# Patient Record
Sex: FEMALE | Race: WHITE | NOT HISPANIC OR LATINO | Employment: OTHER | URBAN - METROPOLITAN AREA
[De-identification: names, ages, dates, MRNs, and addresses within clinical notes are randomized per-mention and may not be internally consistent; named-entity substitution may affect disease eponyms.]

---

## 2018-08-10 ENCOUNTER — HOSPITAL ENCOUNTER (EMERGENCY)
Facility: HOSPITAL | Age: 78
Discharge: HOME | End: 2018-08-10
Attending: EMERGENCY MEDICINE | Admitting: EMERGENCY MEDICINE
Payer: MEDICARE

## 2018-08-10 ENCOUNTER — APPOINTMENT (EMERGENCY)
Dept: RADIOLOGY | Facility: HOSPITAL | Age: 78
End: 2018-08-10
Payer: MEDICARE

## 2018-08-10 VITALS
RESPIRATION RATE: 24 BRPM | WEIGHT: 180 LBS | TEMPERATURE: 98.6 F | OXYGEN SATURATION: 97 % | SYSTOLIC BLOOD PRESSURE: 178 MMHG | HEART RATE: 107 BPM | DIASTOLIC BLOOD PRESSURE: 83 MMHG

## 2018-08-10 DIAGNOSIS — R05.9 COUGH: Primary | ICD-10-CM

## 2018-08-10 DIAGNOSIS — J30.2 SEASONAL ALLERGIES: ICD-10-CM

## 2018-08-10 LAB
ALBUMIN SERPL BCP-MCNC: 2.6 G/DL (ref 3.5–5)
ALP SERPL-CCNC: 123 U/L (ref 46–116)
ALT SERPL W P-5'-P-CCNC: 13 U/L (ref 12–78)
ANION GAP SERPL CALCULATED.3IONS-SCNC: 6 MMOL/L (ref 4–13)
AST SERPL W P-5'-P-CCNC: 20 U/L (ref 5–45)
BASOPHILS # BLD AUTO: 0.04 THOUSANDS/ΜL (ref 0–0.1)
BASOPHILS NFR BLD AUTO: 0 % (ref 0–1)
BILIRUB SERPL-MCNC: 0.4 MG/DL (ref 0.2–1)
BUN SERPL-MCNC: 20 MG/DL (ref 5–25)
CALCIUM SERPL-MCNC: 8.6 MG/DL (ref 8.3–10.1)
CHLORIDE SERPL-SCNC: 104 MMOL/L (ref 100–108)
CO2 SERPL-SCNC: 29 MMOL/L (ref 21–32)
CREAT SERPL-MCNC: 0.79 MG/DL (ref 0.6–1.3)
EOSINOPHIL # BLD AUTO: 0.23 THOUSAND/ΜL (ref 0–0.61)
EOSINOPHIL NFR BLD AUTO: 3 % (ref 0–6)
ERYTHROCYTE [DISTWIDTH] IN BLOOD BY AUTOMATED COUNT: 13.2 % (ref 11.6–15.1)
GFR SERPL CREATININE-BSD FRML MDRD: 71 ML/MIN/1.73SQ M
GLUCOSE SERPL-MCNC: 114 MG/DL (ref 65–140)
HCT VFR BLD AUTO: 35.7 % (ref 34.8–46.1)
HGB BLD-MCNC: 10.9 G/DL (ref 11.5–15.4)
IMM GRANULOCYTES # BLD AUTO: 0.04 THOUSAND/UL (ref 0–0.2)
IMM GRANULOCYTES NFR BLD AUTO: 0 % (ref 0–2)
LYMPHOCYTES # BLD AUTO: 1.83 THOUSANDS/ΜL (ref 0.6–4.47)
LYMPHOCYTES NFR BLD AUTO: 20 % (ref 14–44)
MCH RBC QN AUTO: 25.6 PG (ref 26.8–34.3)
MCHC RBC AUTO-ENTMCNC: 30.5 G/DL (ref 31.4–37.4)
MCV RBC AUTO: 84 FL (ref 82–98)
MONOCYTES # BLD AUTO: 1.1 THOUSAND/ΜL (ref 0.17–1.22)
MONOCYTES NFR BLD AUTO: 12 % (ref 4–12)
NEUTROPHILS # BLD AUTO: 6.13 THOUSANDS/ΜL (ref 1.85–7.62)
NEUTS SEG NFR BLD AUTO: 65 % (ref 43–75)
NRBC BLD AUTO-RTO: 0 /100 WBCS
PLATELET # BLD AUTO: 384 THOUSANDS/UL (ref 149–390)
PMV BLD AUTO: 9.2 FL (ref 8.9–12.7)
POTASSIUM SERPL-SCNC: 3.5 MMOL/L (ref 3.5–5.3)
PROT SERPL-MCNC: 6.8 G/DL (ref 6.4–8.2)
RBC # BLD AUTO: 4.25 MILLION/UL (ref 3.81–5.12)
SODIUM SERPL-SCNC: 139 MMOL/L (ref 136–145)
WBC # BLD AUTO: 9.37 THOUSAND/UL (ref 4.31–10.16)

## 2018-08-10 PROCEDURE — 80053 COMPREHEN METABOLIC PANEL: CPT | Performed by: EMERGENCY MEDICINE

## 2018-08-10 PROCEDURE — 71045 X-RAY EXAM CHEST 1 VIEW: CPT

## 2018-08-10 PROCEDURE — 85025 COMPLETE CBC W/AUTO DIFF WBC: CPT | Performed by: EMERGENCY MEDICINE

## 2018-08-10 PROCEDURE — 94640 AIRWAY INHALATION TREATMENT: CPT

## 2018-08-10 PROCEDURE — 36415 COLL VENOUS BLD VENIPUNCTURE: CPT | Performed by: EMERGENCY MEDICINE

## 2018-08-10 PROCEDURE — 99283 EMERGENCY DEPT VISIT LOW MDM: CPT

## 2018-08-10 RX ORDER — FLUTICASONE PROPIONATE 50 MCG
1 SPRAY, SUSPENSION (ML) NASAL DAILY
COMMUNITY
End: 2018-08-15

## 2018-08-10 RX ORDER — IPRATROPIUM BROMIDE AND ALBUTEROL SULFATE 2.5; .5 MG/3ML; MG/3ML
3 SOLUTION RESPIRATORY (INHALATION) ONCE
Status: COMPLETED | OUTPATIENT
Start: 2018-08-10 | End: 2018-08-10

## 2018-08-10 RX ORDER — LORATADINE 10 MG/1
10 TABLET ORAL DAILY
COMMUNITY
End: 2018-08-15

## 2018-08-10 RX ORDER — ALBUTEROL SULFATE 90 UG/1
2 AEROSOL, METERED RESPIRATORY (INHALATION) EVERY 4 HOURS PRN
Qty: 1 INHALER | Refills: 0 | Status: SHIPPED | OUTPATIENT
Start: 2018-08-10 | End: 2018-08-15

## 2018-08-10 RX ORDER — CETIRIZINE HYDROCHLORIDE, PSEUDOEPHEDRINE HYDROCHLORIDE 5; 120 MG/1; MG/1
1 TABLET, FILM COATED, EXTENDED RELEASE ORAL 2 TIMES DAILY
Qty: 30 TABLET | Refills: 0 | Status: SHIPPED | OUTPATIENT
Start: 2018-08-10 | End: 2018-08-15

## 2018-08-10 RX ADMIN — IPRATROPIUM BROMIDE AND ALBUTEROL SULFATE 3 ML: .5; 3 SOLUTION RESPIRATORY (INHALATION) at 02:40

## 2018-08-10 NOTE — ED PROVIDER NOTES
History  Chief Complaint   Patient presents with    Cough     sx's started , non productive cough,dx by primary  Allergies given Flonase,Claritin  Cough increase  appt with ent today  Patient states she started coughing about 2 weeks ago on or about the   She has not had a fever  The cough is very poorly to nonproductive  Patient was seen by her PCP who thought that the cough was caused by allergies and put her on medications  The patient states however she could not tolerate the medications and stopped the med after 3 or 4 days  Since that time she has had continuous cough worse at night with sleep disturbance  Frustrated she came to the ED  She has no sick contacts no fever chills            Prior to Admission Medications   Prescriptions Last Dose Informant Patient Reported? Taking?   fluticasone (FLONASE) 50 mcg/act nasal spray   Yes Yes   Si spray into each nostril daily   loratadine (CLARITIN) 10 mg tablet   Yes Yes   Sig: Take 10 mg by mouth daily      Facility-Administered Medications: None       No past medical history on file  No past surgical history on file  No family history on file  I have reviewed and agree with the history as documented  Social History   Substance Use Topics    Smoking status: Never Smoker    Smokeless tobacco: Never Used    Alcohol use No        Review of Systems   Constitutional: Negative for chills and fever  HENT: Positive for congestion  Negative for sore throat  Respiratory: Positive for cough  Negative for wheezing  Cardiovascular: Negative for chest pain  Gastrointestinal: Negative for abdominal pain  Genitourinary: Negative for dysuria  Musculoskeletal: Negative for back pain and neck pain  Skin: Negative for rash  Neurological: Positive for weakness  Negative for headaches  All other systems reviewed and are negative        Physical Exam  Physical Exam   Constitutional: She is oriented to person, place, and time  She appears well-developed and well-nourished  HENT:   Head: Normocephalic and atraumatic  Mouth/Throat: Oropharynx is clear and moist    Eyes: Conjunctivae are normal    Neck: Normal range of motion  Neck supple  Cardiovascular: Normal rate, regular rhythm and normal heart sounds  Pulmonary/Chest: Effort normal and breath sounds normal  She has no wheezes  Abdominal: Soft  Bowel sounds are normal  She exhibits no distension  There is no tenderness  Musculoskeletal: Normal range of motion  She exhibits no edema  Neurological: She is alert and oriented to person, place, and time  Skin: Skin is warm and dry  Psychiatric: She has a normal mood and affect  Her behavior is normal    Nursing note and vitals reviewed        Vital Signs  ED Triage Vitals [08/10/18 0215]   Temperature Pulse Respirations Blood Pressure SpO2   98 6 °F (37 °C) (!) 107 (!) 24 (!) 178/83 97 %      Temp Source Heart Rate Source Patient Position - Orthostatic VS BP Location FiO2 (%)   Tympanic Monitor Lying -- --      Pain Score       2           Vitals:    08/10/18 0215   BP: (!) 178/83   Pulse: (!) 107   Patient Position - Orthostatic VS: Lying       Visual Acuity      ED Medications  Medications   ipratropium-albuterol (DUO-NEB) 0 5-2 5 mg/3 mL inhalation solution 3 mL (3 mL Nebulization Given 8/10/18 0240)       Diagnostic Studies  Results Reviewed     Procedure Component Value Units Date/Time    Comprehensive metabolic panel [[id number]]  (Abnormal) Collected:  08/10/18 0250    Lab Status:  Final result Specimen:  Blood from Vein Updated:  08/10/18 0311     Sodium 139 mmol/L      Potassium 3 5 mmol/L      Chloride 104 mmol/L      CO2 29 mmol/L      Anion Gap 6 mmol/L      BUN 20 mg/dL      Creatinine 0 79 mg/dL      Glucose 114 mg/dL      Calcium 8 6 mg/dL      AST 20 U/L      ALT 13 U/L      Alkaline Phosphatase 123 (H) U/L      Total Protein 6 8 g/dL      Albumin 2 6 (L) g/dL      Total Bilirubin 0 40 mg/dL      eGFR 71 ml/min/1 73sq m     Narrative:         National Kidney Disease Education Program recommendations are as follows:  GFR calculation is accurate only with a steady state creatinine  Chronic Kidney disease less than 60 ml/min/1 73 sq  meters  Kidney failure less than 15 ml/min/1 73 sq  meters  CBC and differential [[id number]]  (Abnormal) Collected:  08/10/18 0250    Lab Status:  Final result Specimen:  Blood from Vein Updated:  08/10/18 0255     WBC 9 37 Thousand/uL      RBC 4 25 Million/uL      Hemoglobin 10 9 (L) g/dL      Hematocrit 35 7 %      MCV 84 fL      MCH 25 6 (L) pg      MCHC 30 5 (L) g/dL      RDW 13 2 %      MPV 9 2 fL      Platelets 332 Thousands/uL      nRBC 0 /100 WBCs      Neutrophils Relative 65 %      Immat GRANS % 0 %      Lymphocytes Relative 20 %      Monocytes Relative 12 %      Eosinophils Relative 3 %      Basophils Relative 0 %      Neutrophils Absolute 6 13 Thousands/µL      Immature Grans Absolute 0 04 Thousand/uL      Lymphocytes Absolute 1 83 Thousands/µL      Monocytes Absolute 1 10 Thousand/µL      Eosinophils Absolute 0 23 Thousand/µL      Basophils Absolute 0 04 Thousands/µL                  XR chest 1 view portable    (Results Pending)   XR chest 1 view    (Results Pending)              Procedures  Procedures       Phone Contacts  ED Phone Contact    ED Course                               MDM  Number of Diagnoses or Management Options  Cough:   Seasonal allergies:   Diagnosis management comments: Will check for possible infectious cause in this elderly female for this cough however my initial impression is that her PMD was correct    If so I will change allergy meds to something she may be tolerating better    CritCare Time    Disposition  Final diagnoses:   Cough   Seasonal allergies     Time reflects when diagnosis was documented in both MDM as applicable and the Disposition within this note     Time User Action Codes Description Comment    8/10/2018  3:13 AM Cy Bodily Add [R05] Cough     8/10/2018  3:13 AM Rad SOTO Add [J30 2] Seasonal allergies       ED Disposition     ED Disposition Condition Comment    Discharge  Gilbertamy Susana discharge to home/self care  Condition at discharge: Stable        Follow-up Information     Follow up With Specialties Details Why Contact Info    Bhargav Avendaño MD Internal Medicine Schedule an appointment as soon as possible for a visit in 1 day  [address]  [phone]            Patient's Medications   Discharge Prescriptions    ALBUTEROL (PROVENTIL HFA,VENTOLIN HFA) 90 MCG/ACT INHALER    Inhale 2 puffs every 4 (four) hours as needed for wheezing       Start Date: 8/10/2018 End Date: --       Order Dose: 2 puffs       Quantity: 1 Inhaler    Refills: 0    CETIRIZINE-PSEUDOEPHEDRINE (ZYRTEC-D) 5-120 MG PER TABLET    Take 1 tablet by mouth 2 (two) times a day       Start Date: 8/10/2018 End Date: --       Order Dose: 1 tablet       Quantity: 30 tablet    Refills: 0     No discharge procedures on file      ED Provider  Electronically Signed by           Paula Mitchell MD  08/10/18 4251

## 2018-08-10 NOTE — DISCHARGE INSTRUCTIONS
Allergies, Ambulatory Care   GENERAL INFORMATION:   Allergies  are an immune system reaction to a substance called an allergen  Your immune system sees the allergen as harmful and attacks it  Common symptoms include the following:   · Sneezing and runny, itchy, or stuffy nose    · Swollen, watery, or itchy eyes    · Itchy skin, mouth, ears, or throat    · Swelling, pain, or itch at the site of an insect sting  Seek immediate care for the following symptoms:   · Trouble swallowing or your throat or tongue is swollen    · Wheezing or trouble breathing    · Dizziness or feeling faint    · Chest pain or your heart is fluttering  Treatment for allergies  may include medicines to slow a serious allergic reaction  You may be given medicines that help decrease itching, sneezing, and swelling or help your nose feel less stuffy  Your healthcare provider may give you several different medicines to help decrease swelling, redness, and itching  Medicines may be given as pills, shots, or put directly on your skin  Nasal sprays or eye drops may also be used  Desensitization treatment may get your body used to allergens you cannot avoid  Your healthcare provider will give you a shot that contains a small amount of an allergen, giving a little more each time until your body gets used to it  Your healthcare provider will watch you closely and treat any allergic reaction you have  Your reaction to the allergen may be less serious after this treatment  Ask your healthcare provider how long you need to get the shots  Manage allergies:   · Use nasal rinses  Healthcare providers may suggest that you rinse your nasal passages with a saline solution  Daily rinsing may help clear your nose of allergens  · Do not smoke  Your allergy symptoms may decrease if you are not around smoke  If you smoke, it is never too late to quit  Ask your healthcare provider for information about how to stop if you need help quitting      · Carry medical alert identification  You may want to wear medical alert jewelry or carry a card that says you have an allergy  Ask your healthcare provider where to get medical alert identification  Prevent allergic reactions:   · Avoid seasonal allergic reactions  Do not go outside when pollen counts are high  Your symptoms may be better if you go outside only in the morning or evening  Use your air conditioner and change air filters often  · Dust and vacuum your home often  to avoid allergic reactions to dust, fur, or mold  You may want to wear a mask when you vacuum  Keep pets in certain rooms and bathe them often  Use a dehumidifier (machine that decreases moisture) to help prevent mold  · Do not use products that contain latex  if you have a latex allergy  Use nonlatex gloves if you work in healthcare or in food preparation  Always tell healthcare providers if you have a latex allergy  · Avoid insect stings  Stay away from areas or activities that increase your risk for being stung  These include trash cans, gardening, and picnics  Do not wear bright clothing or strong scents when you will be outside  Follow up with your healthcare provider as directed:  Write down your questions so you remember to ask them during your visits  CARE AGREEMENT:   You have the right to help plan your care  Learn about your health condition and how it may be treated  Discuss treatment options with your caregivers to decide what care you want to receive  You always have the right to refuse treatment  The above information is an  only  It is not intended as medical advice for individual conditions or treatments  Talk to your doctor, nurse or pharmacist before following any medical regimen to see if it is safe and effective for you  © 2014 [address] is for End User's use only and may not be sold, redistributed or otherwise used for commercial purposes   All illustrations and images included in [employer] are the copyrighted property of A D A M [employer] Inc  or [employer]

## 2018-08-15 ENCOUNTER — HOSPITAL ENCOUNTER (INPATIENT)
Facility: HOSPITAL | Age: 78
LOS: 2 days | Discharge: HOME | DRG: 755 | End: 2018-08-17
Attending: EMERGENCY MEDICINE | Admitting: FAMILY MEDICINE
Payer: MEDICARE

## 2018-08-15 ENCOUNTER — APPOINTMENT (EMERGENCY)
Dept: RADIOLOGY | Facility: HOSPITAL | Age: 78
DRG: 755 | End: 2018-08-15
Payer: MEDICARE

## 2018-08-15 DIAGNOSIS — I82.4Z2 DVT, LOWER EXTREMITY, DISTAL, ACUTE, LEFT (HCC): ICD-10-CM

## 2018-08-15 DIAGNOSIS — R18.8 ASCITES: ICD-10-CM

## 2018-08-15 DIAGNOSIS — R19.00 PELVIC MASS IN FEMALE: Primary | ICD-10-CM

## 2018-08-15 DIAGNOSIS — N83.8 OVARIAN MASS, LEFT: ICD-10-CM

## 2018-08-15 DIAGNOSIS — R91.1 PULMONARY NODULE: ICD-10-CM

## 2018-08-15 PROBLEM — R22.1 NECK SWELLING: Status: ACTIVE | Noted: 2018-08-15

## 2018-08-15 PROBLEM — R60.0 BILATERAL LEG EDEMA: Status: ACTIVE | Noted: 2018-08-15

## 2018-08-15 PROBLEM — N85.00 ENDOMETRIAL HYPERPLASIA: Status: ACTIVE | Noted: 2018-08-15

## 2018-08-15 PROBLEM — D72.829 LEUCOCYTOSIS: Status: ACTIVE | Noted: 2018-08-15

## 2018-08-15 LAB
ALBUMIN SERPL BCP-MCNC: 2.8 G/DL (ref 3.5–5)
ALP SERPL-CCNC: 133 U/L (ref 46–116)
ALT SERPL W P-5'-P-CCNC: 12 U/L (ref 12–78)
ANION GAP SERPL CALCULATED.3IONS-SCNC: 4 MMOL/L (ref 4–13)
APTT PPP: 28 SECONDS (ref 24–33)
AST SERPL W P-5'-P-CCNC: 19 U/L (ref 5–45)
BACTERIA UR QL AUTO: ABNORMAL /HPF
BASOPHILS # BLD AUTO: 0.05 THOUSANDS/ΜL (ref 0–0.1)
BASOPHILS NFR BLD AUTO: 1 % (ref 0–1)
BILIRUB SERPL-MCNC: 0.4 MG/DL (ref 0.2–1)
BILIRUB UR QL STRIP: ABNORMAL
BUN SERPL-MCNC: 18 MG/DL (ref 5–25)
CALCIUM SERPL-MCNC: 8.9 MG/DL (ref 8.3–10.1)
CHLORIDE SERPL-SCNC: 102 MMOL/L (ref 100–108)
CLARITY UR: CLEAR
CO2 SERPL-SCNC: 32 MMOL/L (ref 21–32)
COLOR UR: YELLOW
CREAT SERPL-MCNC: 0.81 MG/DL (ref 0.6–1.3)
EOSINOPHIL # BLD AUTO: 0.09 THOUSAND/ΜL (ref 0–0.61)
EOSINOPHIL NFR BLD AUTO: 1 % (ref 0–6)
ERYTHROCYTE [DISTWIDTH] IN BLOOD BY AUTOMATED COUNT: 13.3 % (ref 11.6–15.1)
GFR SERPL CREATININE-BSD FRML MDRD: 69 ML/MIN/1.73SQ M
GLUCOSE SERPL-MCNC: 100 MG/DL (ref 65–140)
GLUCOSE UR STRIP-MCNC: NEGATIVE MG/DL
HCT VFR BLD AUTO: 39.5 % (ref 34.8–46.1)
HGB BLD-MCNC: 11.9 G/DL (ref 11.5–15.4)
HGB UR QL STRIP.AUTO: ABNORMAL
IMM GRANULOCYTES # BLD AUTO: 0.03 THOUSAND/UL (ref 0–0.2)
IMM GRANULOCYTES NFR BLD AUTO: 0 % (ref 0–2)
INR PPP: 1.06 (ref 0.86–1.16)
KETONES UR STRIP-MCNC: ABNORMAL MG/DL
LEUKOCYTE ESTERASE UR QL STRIP: ABNORMAL
LIPASE SERPL-CCNC: 104 U/L (ref 73–393)
LYMPHOCYTES # BLD AUTO: 1.35 THOUSANDS/ΜL (ref 0.6–4.47)
LYMPHOCYTES NFR BLD AUTO: 12 % (ref 14–44)
MCH RBC QN AUTO: 25.4 PG (ref 26.8–34.3)
MCHC RBC AUTO-ENTMCNC: 30.1 G/DL (ref 31.4–37.4)
MCV RBC AUTO: 84 FL (ref 82–98)
MONOCYTES # BLD AUTO: 1 THOUSAND/ΜL (ref 0.17–1.22)
MONOCYTES NFR BLD AUTO: 9 % (ref 4–12)
NEUTROPHILS # BLD AUTO: 8.37 THOUSANDS/ΜL (ref 1.85–7.62)
NEUTS SEG NFR BLD AUTO: 77 % (ref 43–75)
NITRITE UR QL STRIP: NEGATIVE
NON-SQ EPI CELLS URNS QL MICRO: ABNORMAL /HPF
NRBC BLD AUTO-RTO: 0 /100 WBCS
PH UR STRIP.AUTO: 6 [PH] (ref 5–9)
PLATELET # BLD AUTO: 463 THOUSANDS/UL (ref 149–390)
PLATELET # BLD AUTO: 486 THOUSANDS/UL (ref 149–390)
PMV BLD AUTO: 9.1 FL (ref 8.9–12.7)
PMV BLD AUTO: 9.2 FL (ref 8.9–12.7)
POTASSIUM SERPL-SCNC: 3.6 MMOL/L (ref 3.5–5.3)
PROT SERPL-MCNC: 7.5 G/DL (ref 6.4–8.2)
PROT UR STRIP-MCNC: ABNORMAL MG/DL
PROTHROMBIN TIME: 11.1 SECONDS (ref 9.4–11.7)
RBC # BLD AUTO: 4.68 MILLION/UL (ref 3.81–5.12)
RBC #/AREA URNS AUTO: ABNORMAL /HPF
SODIUM SERPL-SCNC: 138 MMOL/L (ref 136–145)
SP GR UR STRIP.AUTO: 1.01 (ref 1–1.03)
UROBILINOGEN UR QL STRIP.AUTO: 0.2 E.U./DL
WBC # BLD AUTO: 10.89 THOUSAND/UL (ref 4.31–10.16)
WBC #/AREA URNS AUTO: ABNORMAL /HPF

## 2018-08-15 PROCEDURE — 81001 URINALYSIS AUTO W/SCOPE: CPT | Performed by: EMERGENCY MEDICINE

## 2018-08-15 PROCEDURE — 87081 CULTURE SCREEN ONLY: CPT | Performed by: INTERNAL MEDICINE

## 2018-08-15 PROCEDURE — 83690 ASSAY OF LIPASE: CPT | Performed by: EMERGENCY MEDICINE

## 2018-08-15 PROCEDURE — 99285 EMERGENCY DEPT VISIT HI MDM: CPT

## 2018-08-15 PROCEDURE — 85730 THROMBOPLASTIN TIME PARTIAL: CPT | Performed by: EMERGENCY MEDICINE

## 2018-08-15 PROCEDURE — 85049 AUTOMATED PLATELET COUNT: CPT | Performed by: INTERNAL MEDICINE

## 2018-08-15 PROCEDURE — 85025 COMPLETE CBC W/AUTO DIFF WBC: CPT | Performed by: EMERGENCY MEDICINE

## 2018-08-15 PROCEDURE — 36415 COLL VENOUS BLD VENIPUNCTURE: CPT | Performed by: EMERGENCY MEDICINE

## 2018-08-15 PROCEDURE — 80053 COMPREHEN METABOLIC PANEL: CPT | Performed by: EMERGENCY MEDICINE

## 2018-08-15 PROCEDURE — 96360 HYDRATION IV INFUSION INIT: CPT

## 2018-08-15 PROCEDURE — 85610 PROTHROMBIN TIME: CPT | Performed by: EMERGENCY MEDICINE

## 2018-08-15 PROCEDURE — 74177 CT ABD & PELVIS W/CONTRAST: CPT

## 2018-08-15 PROCEDURE — 93005 ELECTROCARDIOGRAM TRACING: CPT

## 2018-08-15 PROCEDURE — 94664 DEMO&/EVAL PT USE INHALER: CPT

## 2018-08-15 PROCEDURE — 76856 US EXAM PELVIC COMPLETE: CPT

## 2018-08-15 PROCEDURE — 99223 1ST HOSP IP/OBS HIGH 75: CPT | Performed by: INTERNAL MEDICINE

## 2018-08-15 RX ORDER — GUAIFENESIN/DEXTROMETHORPHAN 100-10MG/5
10 SYRUP ORAL EVERY 4 HOURS PRN
Status: DISCONTINUED | OUTPATIENT
Start: 2018-08-15 | End: 2018-08-17 | Stop reason: HOSPADM

## 2018-08-15 RX ORDER — ACETAMINOPHEN 325 MG/1
650 TABLET ORAL EVERY 6 HOURS PRN
Status: DISCONTINUED | OUTPATIENT
Start: 2018-08-15 | End: 2018-08-17 | Stop reason: HOSPADM

## 2018-08-15 RX ORDER — ONDANSETRON 2 MG/ML
4 INJECTION INTRAMUSCULAR; INTRAVENOUS EVERY 6 HOURS PRN
Status: DISCONTINUED | OUTPATIENT
Start: 2018-08-15 | End: 2018-08-17 | Stop reason: HOSPADM

## 2018-08-15 RX ORDER — HEPARIN SODIUM 5000 [USP'U]/ML
5000 INJECTION, SOLUTION INTRAVENOUS; SUBCUTANEOUS EVERY 8 HOURS SCHEDULED
Status: DISCONTINUED | OUTPATIENT
Start: 2018-08-15 | End: 2018-08-16

## 2018-08-15 RX ADMIN — IOHEXOL 100 ML: 350 INJECTION, SOLUTION INTRAVENOUS at 13:46

## 2018-08-15 RX ADMIN — SODIUM CHLORIDE 1000 ML: 0.9 INJECTION, SOLUTION INTRAVENOUS at 12:44

## 2018-08-15 RX ADMIN — HEPARIN SODIUM 5000 UNITS: 5000 INJECTION, SOLUTION INTRAVENOUS; SUBCUTANEOUS at 21:21

## 2018-08-15 NOTE — H&P
History and Physical - [facility] Internal Medicine    Patient Information: Mimi Hyde 77 y o  female MRN: [id number]  Unit/Bed#: [address] [address] Encounter: [id number]  Admitting Physician: Kiera Jones MD  PCP: Randal Waters MD  Date of Admission:  08/15/18        Hospital Problem List:     Principal Problem:    Ascites  Active Problems:    Ovarian mass, left    Endometrial hyperplasia    Pulmonary nodule    Leucocytosis    Bilateral leg edema      Assessment/Plan:    * Ascites   Assessment & Plan    New onset  Now symptomatic with abdominal distention, nausea and poor appetite  Denies shortness of breath or abdominal pain  Likely related to left ovarian mass  No evidence of cirrhosis or CHF  Will get diagnostic & therapeutic paracentesis  Check  and cytology            Endometrial hyperplasia   Assessment & Plan    9 mm  in maximal thickness, suggesting the possibility of endometrial hyperplasia or neoplasia  Denies any bleeding or discharge  Follow up with gyn Oncology as above        Ovarian mass, left   Assessment & Plan    Complex left adnexal mass measuring 3 6 x 3 2 x 4 2 cm   Check   Denies abdominal pain or vaginal discharge  Discussed with GYNOncology, Dr Clementina Woods  Agree with paracentesis  Will schedule outpatient follow-up in office as soon as possible after discharge          Pulmonary nodule   Assessment & Plan    Incidental  Follow-up as outpatient        Bilateral leg edema   Assessment & Plan    Gradually progressive over 2-3 weeks  Denies chest pain or shortness of breath  Will check venous Doppler        Leucocytosis   Assessment & Plan    Likely reactive  Monitor                VTE Prophylaxis: Heparin  / sequential compression device   Code Status: Level 1 - Full Code    Anticipated Length of Stay:  Patient will be admitted on an Inpatient basis with an anticipated length of stay of  > 2 midnights     Justification for Hospital Stay:  Evaluation treatment of symptomatic new onset ascites    Total Time for Visit, including Counseling / Coordination of Care: 45 minutes  Greater than 50% of this total time spent on direct patient counseling and coordination of care  Chief Complaint:     Abdominal Pain (mid abd pain since about the 27th of July, intermittent nausea, abd distention, soft stools)    History of Present Illness:    Wilbert Chi is a 77 y o  female with no significant past medical history who presents with abdominal distention over last 2 - 3 weeks  Patient reported that she noticed over last few weeks that she was getting progressive abdominal distention which was associated with nausea and dry heaving and poor appetite  Patient also reported ongoing non productive cough over last 2 weeks for which she was evaluated emergency room a last week and which was deemed secondary to allergy and now appears to be better  Patient her scheduled follow-up appointment with PMD today and was noticed to have abdominal distention  Patient was subsequently referred to emergency room for further evaluation  In ED, patient was noted to have ascites and left ovarian mass subsequently admitted for further evaluation workup  Patient denied any abdominal pain, chest pain, shortness of breath, dizziness, weight loss, urinary symptoms, bleeding or vaginal discharge  Review of Systems:    Review of Systems   Constitutional: Positive for appetite change  Negative for activity change, chills, diaphoresis, fatigue and fever  HENT: Negative for congestion, sore throat and trouble swallowing  Eyes: Negative for visual disturbance  Respiratory: Positive for cough  Negative for chest tightness, shortness of breath, wheezing and stridor  Cardiovascular: Positive for leg swelling  Negative for chest pain and palpitations  Gastrointestinal: Positive for abdominal distention and nausea  Negative for abdominal pain, anal bleeding and blood in stool     Genitourinary: Negative for difficulty urinating, dysuria, flank pain, pelvic pain, vaginal bleeding and vaginal discharge  Musculoskeletal: Negative for arthralgias, back pain and neck stiffness  Skin: Negative for pallor and rash  Neurological: Negative for dizziness, seizures, facial asymmetry, speech difficulty, light-headedness, numbness and headaches  Hematological: Negative for adenopathy  Psychiatric/Behavioral: Negative for agitation and confusion  Past Medical and Surgical History:     History reviewed  No pertinent past medical history  Past Surgical History:   Procedure Laterality Date    APPENDECTOMY         Meds/Allergies:    PTA meds:   None       Allergies: No Known Allergies  History:     Marital Status:      Substance Use History:   History   Alcohol Use No     History   Smoking Status    Never Smoker   Smokeless Tobacco    Never Used     History   Drug Use No       Family History:    Family History   Problem Relation Age of Onset    Heart disease Father     Cancer Brother        Physical Exam:     Vitals:   Blood Pressure: 166/80 (08/15/18 1432)  Pulse: 100 (08/15/18 1623)  Temperature: 99 °F (37 2 °C) (08/15/18 1623)  Temp Source: Oral (08/15/18 1623)  Respirations: 18 (08/15/18 1623)  Height: 5' 3" (160 cm) (08/15/18 1623)  Weight - Scale: 86 2 kg (190 lb) (08/15/18 1623)  SpO2: 97 % (08/15/18 1623)    Physical Exam   Constitutional: She appears well-developed  No distress  HENT:   Head: Normocephalic and atraumatic  Nose: Nose normal    Eyes: Conjunctivae and EOM are normal  Pupils are equal, round, and reactive to light  Neck: Normal range of motion  Neck supple  No JVD present  Cardiovascular: Normal rate, regular rhythm and normal heart sounds  Pulmonary/Chest: Effort normal  No respiratory distress  She has decreased breath sounds  She has no wheezes  She has no rhonchi  She has no rales  She exhibits no tenderness  Abdominal: Soft   Bowel sounds are normal  She exhibits distension and ascites  There is no tenderness  There is no rebound and no guarding  Musculoskeletal: She exhibits edema (Bilateral lower extremity)  Neurological: She is alert  No cranial nerve deficit  Skin: Skin is warm and dry  No rash noted  Psychiatric: She has a normal mood and affect  Lab Results: I have personally reviewed pertinent reports  Results from last 7 days  Lab Units 08/15/18  1243   WBC Thousand/uL 10 89*   HEMOGLOBIN g/dL 11 9   HEMATOCRIT % 39 5   PLATELETS Thousands/uL 486*   NEUTROS PCT % 77*   LYMPHS PCT % 12*   MONOS PCT % 9   EOS PCT % 1       Results from last 7 days  Lab Units 08/15/18  1243   SODIUM mmol/L 138   POTASSIUM mmol/L 3 6   CHLORIDE mmol/L 102   CO2 mmol/L 32   BUN mg/dL 18   CREATININE mg/dL 0 81   CALCIUM mg/dL 8 9   TOTAL PROTEIN g/dL 7 5   BILIRUBIN TOTAL mg/dL 0 40   ALK PHOS U/L 133*   ALT U/L 12   AST U/L 19   GLUCOSE RANDOM mg/dL 100       Results from last 7 days  Lab Units 08/15/18  1243   INR  1 06       Imaging: I have personally reviewed pertinent reports  Xr Chest 1 View Portable    Result Date: 8/10/2018  Narrative: CHEST INDICATION:   Cough  COMPARISON:  None EXAM PERFORMED/VIEWS:  XR CHEST PORTABLE 1 image FINDINGS: Cardiomediastinal silhouette appears enlarged  No evidence of heart failure  The lungs are clear  No pneumothorax or pleural effusion  Osseous structures appear within normal limits for patient age  Impression: No acute cardiopulmonary disease  Cardiomegaly  Workstation performed: [id number]     Us Pelvis Complete Non Ob    Result Date: 8/15/2018  Narrative: PELVIC ULTRASOUND, COMPLETE INDICATION:  77years old  ovarian mass, Mieg's syn  Left ovarian mass noted on CT  COMPARISON: CT of the abdomen and pelvis from earlier today, August 15, 2018  TECHNIQUE:   Transabdominal pelvic ultrasound was performed in sagittal and transverse planes with a curvilinear transducer    Additional transvaginal imaging was performed to better evaluate the endometrium and ovaries  Imaging included volumetric sweeps as well as traditional still imaging technique  FINDINGS: UTERUS: Position: Anteverted and anteflexed  Size: 6 4 x 2 2 x 3 4 cm  Myometrium: Normal contour and echogenicity  0 7 x 1 1 cm posterior fundal subserosal fibroid Cervix: Normal in appearance  ENDOMETRIUM:  Thickness: Thickened, measuring 9 mm  in maximal thickness, suggesting the possibility of endometrial hyperplasia or neoplasia  Echotexture: Normal and homogenous in echogenicity with no evidence of endometrial mass or fluid collection  OVARIES/ADNEXA: Right ovary: Not identified with certainty  Left ovary:  No normal left ovary identified  In the left adnexa there is a solid, somewhat lobular mass measuring 4 3 x 4 0 x 2 7 cm, corresponding to the mass noted on CT and suspicious for ovarian neoplasm  Color Doppler demonstrates intralesional vascularity  FREE FLUID: Moderate amount of pelvic ascites  Impression:  1   4 3 x 4 0 x 2 7 cm mass in the left adnexa, most likely a left ovarian tumor  2   Right ovary not identified  3   Thickened endometrium in this postmenopausal patient, consistent with endometrial hyperplasia or neoplasia  4   Moderate amount of ascites  Workstation performed: [id number]     Xr Chest 1 View    Result Date: 8/10/2018  Narrative: CHEST INDICATION:   cough  COMPARISON:  Earlier today EXAM PERFORMED/VIEWS:  XR CHEST 1 VIEW  lateral view 1 image FINDINGS: Correlated with the previously obtained frontally exposure, no abnormality is seen  Cardiomediastinal silhouette appears unremarkable  The lungs are clear  No pneumothorax or pleural effusion  Osseous structures appear within normal limits for patient age  Impression: No acute cardiopulmonary disease   Workstation performed: [id number]     Ct Abdomen Pelvis With Contrast    Result Date: 8/15/2018  Narrative: CT ABDOMEN AND PELVIS WITH IV CONTRAST INDICATION:   Abdominal pain, unspecified  COMPARISON:  None  TECHNIQUE:  CT examination of the abdomen and pelvis was performed  Axial, sagittal, and coronal 2D reformatted images were created from the source data and submitted for interpretation  Radiation dose length product (DLP) for this visit:  990 74 mGy-cm   This examination, like all CT scans performed in the [facility], was performed utilizing techniques to minimize radiation dose exposure, including the use of iterative  reconstruction and automated exposure control  IV Contrast:  100 mL of iohexol (OMNIPAQUE) Enteric Contrast:  Enteric contrast was not administered  FINDINGS: ABDOMEN LOWER CHEST:  Partially imaged right lower lobe nodule measuring 1 2 cm #2/1  Small sliding-type hiatal hernia  LIVER/BILIARY TREE:  Unremarkable  GALLBLADDER:  No calcified gallstones  No pericholecystic inflammatory change  SPLEEN:  Unremarkable  PANCREAS:  Unremarkable  ADRENAL GLANDS:  Unremarkable  KIDNEYS/URETERS:  Parapelvic cysts  No hydronephrosis or perinephric collection  STOMACH AND BOWEL:  Unremarkable  APPENDIX:  No findings to suggest appendicitis  ABDOMINOPELVIC CAVITY:  Large quantity of abdominal and pelvic ascites  No free air  VESSELS:  Unremarkable for patient's age  PELVIS REPRODUCTIVE ORGANS:  Complex left adnexal mass measuring 3 6 x 3 2 x 4 2 cm could represent an ovarian malignancy  Vonda Cunning URINARY BLADDER:  Unremarkable  ABDOMINAL WALL/INGUINAL REGIONS:  Partially imaged cystic areas in the perineum may represent Bartholin's cysts  Fat-containing left inguinal hernia  OSSEOUS STRUCTURES:  No acute fracture or destructive osseous lesion  Impression: Vague 4 cm left adnexal mass could represent an ovarian malignancy especially with the large quantity of abdominal and pelvic ascites  Consider follow-up with pelvic ultrasound Partially imaged 12 mm right lower lobe pulmonary nodule  The study was marked in [facility] for immediate notification   Workstation performed: [id number]       US pelvis complete non OB   Final Result       1   4 3 x 4 0 x 2 7 cm mass in the left adnexa, most likely a left ovarian tumor  2   Right ovary not identified  3   Thickened endometrium in this postmenopausal patient, consistent with endometrial hyperplasia or neoplasia  4   Moderate amount of ascites  Workstation performed: [id number]         CT abdomen pelvis with contrast   Final Result      Vague 4 cm left adnexal mass could represent an ovarian malignancy especially with the large quantity of abdominal and pelvic ascites  Consider follow-up with pelvic ultrasound      Partially imaged 12 mm right lower lobe pulmonary nodule  The study was marked in [facility] for immediate notification  Workstation performed: [id number]          Discussed with Dr Gregory Torres    EKG, Pathology, and Other Studies Reviewed on Admission:   · CT scan and ultrasound    EPIC Records Reviewed: Yes     ** Please Note: Dictation voice to text software may have been used in the creation of this document   **

## 2018-08-15 NOTE — ED NOTES
Patient is resting comfortably  Visitors at bedside  Pt in good spirits       Ananda Vernon RN  08/15/18 3303

## 2018-08-16 ENCOUNTER — APPOINTMENT (INPATIENT)
Dept: NON INVASIVE DIAGNOSTICS | Facility: HOSPITAL | Age: 78
DRG: 755 | End: 2018-08-16
Attending: INTERNAL MEDICINE
Payer: MEDICARE

## 2018-08-16 ENCOUNTER — APPOINTMENT (INPATIENT)
Dept: RADIOLOGY | Facility: HOSPITAL | Age: 78
DRG: 755 | End: 2018-08-16
Payer: MEDICARE

## 2018-08-16 PROBLEM — I82.4Z2 DVT, LOWER EXTREMITY, DISTAL, ACUTE, LEFT (HCC): Status: ACTIVE | Noted: 2018-08-16

## 2018-08-16 PROBLEM — D75.839 THROMBOCYTOSIS: Status: ACTIVE | Noted: 2018-08-16

## 2018-08-16 LAB
ALBUMIN FLD-MCNC: 2.3 G/DL
ALBUMIN SERPL BCP-MCNC: 2.2 G/DL (ref 3.5–5)
ALP SERPL-CCNC: 109 U/L (ref 46–116)
ALT SERPL W P-5'-P-CCNC: 9 U/L (ref 12–78)
ANION GAP SERPL CALCULATED.3IONS-SCNC: 6 MMOL/L (ref 4–13)
APPEARANCE FLD: NORMAL
AST SERPL W P-5'-P-CCNC: 14 U/L (ref 5–45)
ATRIAL RATE: 109 BPM
BASOPHILS # BLD AUTO: 0.05 THOUSANDS/ΜL (ref 0–0.1)
BASOPHILS NFR BLD AUTO: 1 % (ref 0–1)
BILIRUB SERPL-MCNC: 0.3 MG/DL (ref 0.2–1)
BUN SERPL-MCNC: 15 MG/DL (ref 5–25)
CALCIUM SERPL-MCNC: 8.5 MG/DL (ref 8.3–10.1)
CANCER AG125 SERPL-ACNC: 535.9 U/ML (ref 0–30)
CHLORIDE SERPL-SCNC: 104 MMOL/L (ref 100–108)
CO2 SERPL-SCNC: 30 MMOL/L (ref 21–32)
COLOR FLD: NORMAL
CREAT SERPL-MCNC: 0.68 MG/DL (ref 0.6–1.3)
EOSINOPHIL # BLD AUTO: 0.12 THOUSAND/ΜL (ref 0–0.61)
EOSINOPHIL NFR BLD AUTO: 1 % (ref 0–6)
ERYTHROCYTE [DISTWIDTH] IN BLOOD BY AUTOMATED COUNT: 13.4 % (ref 11.6–15.1)
GFR SERPL CREATININE-BSD FRML MDRD: 83 ML/MIN/1.73SQ M
GLUCOSE SERPL-MCNC: 97 MG/DL (ref 65–140)
HCT VFR BLD AUTO: 35.6 % (ref 34.8–46.1)
HGB BLD-MCNC: 10.7 G/DL (ref 11.5–15.4)
HISTIOCYTES NFR FLD: 2 %
IMM GRANULOCYTES # BLD AUTO: 0.04 THOUSAND/UL (ref 0–0.2)
IMM GRANULOCYTES NFR BLD AUTO: 0 % (ref 0–2)
LDH FLD L TO P-CCNC: 331 U/L
LYMPHOCYTES # BLD AUTO: 1.14 THOUSANDS/ΜL (ref 0.6–4.47)
LYMPHOCYTES NFR BLD AUTO: 12 % (ref 14–44)
LYMPHOCYTES NFR BLD AUTO: 83 %
MCH RBC QN AUTO: 25.4 PG (ref 26.8–34.3)
MCHC RBC AUTO-ENTMCNC: 30.1 G/DL (ref 31.4–37.4)
MCV RBC AUTO: 85 FL (ref 82–98)
MONO+MESO NFR FLD MANUAL: 1 %
MONOCYTES # BLD AUTO: 1.03 THOUSAND/ΜL (ref 0.17–1.22)
MONOCYTES NFR BLD AUTO: 11 % (ref 4–12)
MONOCYTES NFR BLD AUTO: 12 %
NEUTROPHILS # BLD AUTO: 6.93 THOUSANDS/ΜL (ref 1.85–7.62)
NEUTS SEG NFR BLD AUTO: 2 %
NEUTS SEG NFR BLD AUTO: 75 % (ref 43–75)
NRBC BLD AUTO-RTO: 0 /100 WBCS
P AXIS: 39 DEGREES
PLATELET # BLD AUTO: 413 THOUSANDS/UL (ref 149–390)
PMV BLD AUTO: 9.5 FL (ref 8.9–12.7)
POTASSIUM SERPL-SCNC: 3.9 MMOL/L (ref 3.5–5.3)
PR INTERVAL: 162 MS
PROT FLD-MCNC: 4.8 G/DL
PROT SERPL-MCNC: 6.2 G/DL (ref 6.4–8.2)
QRS AXIS: -16 DEGREES
QRSD INTERVAL: 88 MS
QT INTERVAL: 324 MS
QTC INTERVAL: 436 MS
RBC # BLD AUTO: 4.21 MILLION/UL (ref 3.81–5.12)
SITE: NORMAL
SODIUM SERPL-SCNC: 140 MMOL/L (ref 136–145)
T WAVE AXIS: 23 DEGREES
TOTAL CELLS COUNTED SPEC: 100
TSH SERPL DL<=0.05 MIU/L-ACNC: 2.53 UIU/ML (ref 0.36–3.74)
VENTRICULAR RATE: 109 BPM
WBC # BLD AUTO: 9.31 THOUSAND/UL (ref 4.31–10.16)
WBC # FLD MANUAL: 1092 /UL

## 2018-08-16 PROCEDURE — 0W9G30Z DRAINAGE OF PERITONEAL CAVITY WITH DRAINAGE DEVICE, PERCUTANEOUS APPROACH: ICD-10-PCS | Performed by: RADIOLOGY

## 2018-08-16 PROCEDURE — 99232 SBSQ HOSP IP/OBS MODERATE 35: CPT | Performed by: INTERNAL MEDICINE

## 2018-08-16 PROCEDURE — 85025 COMPLETE CBC W/AUTO DIFF WBC: CPT | Performed by: INTERNAL MEDICINE

## 2018-08-16 PROCEDURE — 49083 ABD PARACENTESIS W/IMAGING: CPT | Performed by: RADIOLOGY

## 2018-08-16 PROCEDURE — 89051 BODY FLUID CELL COUNT: CPT | Performed by: INTERNAL MEDICINE

## 2018-08-16 PROCEDURE — 86304 IMMUNOASSAY TUMOR CA 125: CPT | Performed by: INTERNAL MEDICINE

## 2018-08-16 PROCEDURE — 88341 IMHCHEM/IMCYTCHM EA ADD ANTB: CPT | Performed by: PATHOLOGY

## 2018-08-16 PROCEDURE — 84157 ASSAY OF PROTEIN OTHER: CPT | Performed by: INTERNAL MEDICINE

## 2018-08-16 PROCEDURE — 88342 IMHCHEM/IMCYTCHM 1ST ANTB: CPT | Performed by: PATHOLOGY

## 2018-08-16 PROCEDURE — 88112 CYTOPATH CELL ENHANCE TECH: CPT | Performed by: PATHOLOGY

## 2018-08-16 PROCEDURE — 88305 TISSUE EXAM BY PATHOLOGIST: CPT | Performed by: PATHOLOGY

## 2018-08-16 PROCEDURE — 93010 ELECTROCARDIOGRAM REPORT: CPT | Performed by: INTERNAL MEDICINE

## 2018-08-16 PROCEDURE — 80053 COMPREHEN METABOLIC PANEL: CPT | Performed by: INTERNAL MEDICINE

## 2018-08-16 PROCEDURE — 93970 EXTREMITY STUDY: CPT | Performed by: SURGERY

## 2018-08-16 PROCEDURE — 49083 ABD PARACENTESIS W/IMAGING: CPT

## 2018-08-16 PROCEDURE — 84443 ASSAY THYROID STIM HORMONE: CPT | Performed by: INTERNAL MEDICINE

## 2018-08-16 PROCEDURE — 83615 LACTATE (LD) (LDH) ENZYME: CPT | Performed by: INTERNAL MEDICINE

## 2018-08-16 PROCEDURE — 82042 OTHER SOURCE ALBUMIN QUAN EA: CPT | Performed by: INTERNAL MEDICINE

## 2018-08-16 PROCEDURE — 93970 EXTREMITY STUDY: CPT

## 2018-08-16 PROCEDURE — 87070 CULTURE OTHR SPECIMN AEROBIC: CPT | Performed by: INTERNAL MEDICINE

## 2018-08-16 PROCEDURE — 87205 SMEAR GRAM STAIN: CPT | Performed by: INTERNAL MEDICINE

## 2018-08-16 RX ADMIN — HEPARIN SODIUM 5000 UNITS: 5000 INJECTION, SOLUTION INTRAVENOUS; SUBCUTANEOUS at 14:42

## 2018-08-16 RX ADMIN — ENOXAPARIN SODIUM 90 MG: 100 INJECTION SUBCUTANEOUS at 21:40

## 2018-08-16 RX ADMIN — HEPARIN SODIUM 5000 UNITS: 5000 INJECTION, SOLUTION INTRAVENOUS; SUBCUTANEOUS at 05:51

## 2018-08-16 NOTE — PLAN OF CARE
Problem: PAIN - ADULT  Goal: Verbalizes/displays adequate comfort level or baseline comfort level  Interventions:  - Encourage patient to monitor pain and request assistance  - Assess pain using appropriate pain scale  - Administer analgesics based on type and severity of pain and evaluate response  - Implement non-pharmacological measures as appropriate and evaluate response  - Consider cultural and social influences on pain and pain management  - Notify physician/advanced practitioner if interventions unsuccessful or patient reports new pain   Outcome: Progressing      Problem: INFECTION - ADULT  Goal: Absence or prevention of progression during hospitalization  INTERVENTIONS:  - Assess and monitor for signs and symptoms of infection  - Monitor lab/diagnostic results  - Monitor all insertion sites, i e  indwelling lines, tubes, and drains  - [place] appropriate cooling/warming therapies per order  - Administer medications as ordered  - Instruct and encourage patient and family to use good hand hygiene technique  - Identify and instruct in appropriate isolation precautions for identified infection/condition    Outcome: Progressing    Goal: Absence of fever/infection during neutropenic period  INTERVENTIONS:  - Monitor WBC   Outcome: Progressing      Problem: SAFETY ADULT  Goal: Patient will remain free of falls  INTERVENTIONS:  - Assess patient frequently for physical needs  -  Identify cognitive and physical deficits and behaviors that affect risk of falls    -  [place] fall precautions as indicated by assessment   - Educate patient/family on patient safety including physical limitations  - Instruct patient to call for assistance with activity based on assessment  - Modify environment to reduce risk of injury  - Consider OT/PT consult to assist with strengthening/mobility   Outcome: Progressing    Goal: Maintain or return to baseline ADL function  INTERVENTIONS:  -  Assess patient's ability to carry out ADLs; assess patient's baseline for ADL function and identify physical deficits which impact ability to perform ADLs (bathing, care of mouth/teeth, toileting, grooming, dressing, etc )  - Assess/evaluate cause of self-care deficits   - Assess range of motion  - Assess patient's mobility; develop plan if impaired  - Assess patient's need for assistive devices and provide as appropriate  - Encourage maximum independence but intervene and supervise when necessary  ¯ Involve family in performance of ADLs  ¯ Assess for home care needs following discharge   ¯ Request OT consult to assist with ADL evaluation and planning for discharge  ¯ Provide patient education as appropriate   Outcome: Progressing    Goal: Maintain or return mobility status to optimal level  INTERVENTIONS:  - Assess patient's baseline mobility status (ambulation, transfers, stairs, etc )    - Identify cognitive and physical deficits and behaviors that affect mobility  - Identify mobility aids required to assist with transfers and/or ambulation (gait belt, sit-to-stand, lift, walker, cane, etc )  - [place] fall precautions as indicated by assessment  - Record patient progress and toleration of activity level on Mobility SBAR; progress patient to next Phase/Stage  - Instruct patient to call for assistance with activity based on assessment  - Request Rehabilitation consult to assist with strengthening/weightbearing, etc    Outcome: Progressing      Problem: DISCHARGE PLANNING  Goal: Discharge to home or other facility with appropriate resources  INTERVENTIONS:  - Identify barriers to discharge w/patient and caregiver  - Arrange for needed discharge resources and transportation as appropriate  - Identify discharge learning needs (meds, wound care, etc )  - Arrange for interpretive services to assist at discharge as needed  - Refer to Case Management Department for coordinating discharge planning if the patient needs post-hospital services based on physician/advanced practitioner order or complex needs related to functional status, cognitive ability, or social support system   Outcome: Progressing      Problem: Knowledge Deficit  Goal: Patient/family/caregiver demonstrates understanding of disease process, treatment plan, medications, and discharge instructions  Complete learning assessment and assess knowledge base    Interventions:  - Provide teaching at level of understanding  - Provide teaching via preferred learning methods   Outcome: Progressing

## 2018-08-16 NOTE — CASE MANAGEMENT
Initial Clinical Review    Admission: Date/Time/Statement: 8/15/18 @ 1438 INPATIENT    Orders Placed This Encounter   Procedures    Inpatient Admission (expected length of stay for this patient is greater than two midnights)     Standing Status:   Standing     Number of Occurrences:   1     Order Specific Question:   Admitting Physician     Answer:   Grace Soriano [[id number]]     Order Specific Question:   Level of Care     Answer:   Med Surg [[id number]]     Order Specific Question:   Estimated length of stay     Answer:   More than 2 Midnights     Order Specific Question:   Certification     Answer:   I certify that inpatient services are medically necessary for this patient for a duration of greater than two midnights  See H&P and MD Progress Notes for additional information about the patient's course of treatment  ED: Date/Time/Mode of Arrival:   ED Arrival Information     Expected Arrival Acuity Means of Arrival Escorted By Service Admission Type    - 8/15/2018 11:53 Urgent [facility] Urgent    Arrival Complaint    abdominal pain          Chief Complaint:   Chief Complaint   Patient presents with    Abdominal Pain     mid abd pain since about the 27th of July, intermittent nausea, abd distention, soft stools       History of Illness:      Comfort Rider is a 77 y o  female with no significant past medical history who presents with abdominal distention over last 2 - 3 weeks  Patient reported that she noticed over last few weeks that she was getting progressive abdominal distention which was associated with nausea and dry heaving and poor appetite  Patient also reported ongoing non productive cough over last 2 weeks for which she was evaluated emergency room a last week and which was deemed secondary to allergy and now appears to be better  Patient her scheduled follow-up appointment with PMD today and was noticed to have abdominal distention    Patient was subsequently referred to emergency room for further evaluation  In ED, patient was noted to have ascites and left ovarian mass subsequently admitted for further evaluation workup  Patient denied any abdominal pain, chest pain, shortness of breath, dizziness, weight loss, urinary symptoms, bleeding or vaginal discharge  ED Vital Signs:   ED Triage Vitals [08/15/18 1217]   Temperature Pulse Respirations Blood Pressure SpO2   98 4 °F (36 9 °C) (!) 110 20 (!) 202/91 96 %      Temp Source Heart Rate Source Patient Position - Orthostatic VS BP Location FiO2 (%)   Tympanic Monitor Sitting Right arm --      Pain Score       No Pain        Wt Readings from Last 1 Encounters:   08/15/18 86 2 kg (190 lb)       Vital Signs (abnormal):   Date/Time  Temp  Pulse  Resp  BP  SpO2  O2 Device    08/16/18 09:38:27  --  87  20   191/85  98 %  --    08/15/18 1400  --   116  --   177/81  93 %  --    08/15/18 1345  --  96  --   173/81  97 %  --    08/15/18 1330  --  96  --  --  97 %  --    08/15/18 1315  --  98  --   171/83  96 %  --    08/15/18 1300  --   112  --  --  96 %  --    08/15/18 1245  --   108  --  168/78  96 %  --    08/15/18 1217  98 4 °F (36 9 °C)   110  20   202/91  96 %  None (Room air)      Abnormal Labs/Diagnostic Test Results:     CT abdomen and pelvis: Vague 4 cm left adnexal mass could represent an ovarian malignancy especially with the large quantity of abdominal and pelvic ascites  US pelvis:      1   4 3 x 4 0 x 2 7 cm mass in the left adnexa, most likely a left ovarian tumor  2   Right ovary not identified  3   Thickened endometrium in this postmenopausal patient, consistent with endometrial hyperplasia or neoplasia  4   Moderate amount of ascites  IR paracentesis -  Clear, svetlana liquid  Total of 5,600 ml       Venous duplex B/L LE: pending    Alk Phos = 133, Albumin = 2 8  WBC = 10 89, ANC = 8 37     = 535 9    ED Treatment:   Medication Administration from 08/15/2018 1152 to 08/15/2018 1606       Date/Time Order Dose Route Action Action by Comments     08/15/2018 1402 sodium chloride 0 9 % bolus 1,000 mL 0 mL Intravenous Stopped Flory Ford RN      08/15/2018 1244 sodium chloride 0 9 % bolus 1,000 mL 1,000 mL Intravenous New Bag Flory Ford RN      08/15/2018 1346 iohexol (OMNIPAQUE) 350 MG/ML injection (MULTI-DOSE) 100 mL 100 mL Intravenous Given Jin Hammonds           Past Medical/Surgical History: Active Ambulatory Problems     Diagnosis Date Noted    No Active Ambulatory Problems     Resolved Ambulatory Problems     Diagnosis Date Noted    No Resolved Ambulatory Problems     No Additional Past Medical History       Admitting Diagnosis: Abdominal pain [R10 9]  Pelvic mass in female [R19 00]  Ascites [R18 8]    Age/Sex: 77 y o  female    Assessment/Plan:     Hospital Problem List:      Principal Problem:    Ascites  Active Problems:    Ovarian mass, left    Endometrial hyperplasia    Pulmonary nodule    Leucocytosis    Bilateral leg edema        Assessment/Plan:         * Ascites   Assessment & Plan     New onset  Now symptomatic with abdominal distention, nausea and poor appetite  Denies shortness of breath or abdominal pain  Likely related to left ovarian mass  No evidence of cirrhosis or CHF  Will get diagnostic & therapeutic paracentesis  Check  and cytology                Endometrial hyperplasia   Assessment & Plan     9 mm  in maximal thickness, suggesting the possibility of endometrial hyperplasia or neoplasia  Denies any bleeding or discharge  Follow up with gyn Oncology as above          Ovarian mass, left   Assessment & Plan     Complex left adnexal mass measuring 3 6 x 3 2 x 4 2 cm   Check   Denies abdominal pain or vaginal discharge  Discussed with GYNOncology, Dr Taveras Headings  Agree with paracentesis    Will schedule outpatient follow-up in office as soon as possible after discharge             Pulmonary nodule   Assessment & Plan     Incidental  Follow-up as outpatient          Bilateral leg edema   Assessment & Plan     Gradually progressive over 2-3 weeks  Denies chest pain or shortness of breath  Will check venous Doppler          Leucocytosis   Assessment & Plan     Likely reactive  Monitor                 VTE Prophylaxis: Heparin  / sequential compression device   Code Status: Level 1 - Full Code     Anticipated Length of Stay:  Patient will be admitted on an Inpatient basis with an anticipated length of stay of  > 2 midnights     Justification for Hospital Stay:  Evaluation treatment of symptomatic new onset ascites    Admission Orders: Venous duplex B/L LE, IR paracentesis, up with assistance, foot pump,       Scheduled Meds:   Current Facility-Administered Medications:  acetaminophen 650 mg Oral Q6H PRN Demario Gomes MD   dextromethorphan-guaiFENesin 10 mL Oral Q4H PRN Demario Gomes MD   heparin (porcine) 5,000 Units Subcutaneous Q8H [address] Demario Gomes MD   ondansetron 4 mg Intravenous Q6H PRN Demario Gomes MD     Continuous Infusions:    PRN Meds:   acetaminophen    dextromethorphan-guaiFENesin    ondansetron

## 2018-08-16 NOTE — ASSESSMENT & PLAN NOTE
Complex left adnexal mass measuring 3 6 x 3 2 x 4 2 cm    significantly elevated   Ref  Range 8/16/2018 06:35    Latest Ref Range: 0 0 - 30 0 U/mL 535 9 (H)     Denies abdominal pain or vaginal discharge  Discussed with GYNOncology, Dr Margarita Rodriguez at the time of admission    His office will schedule outpatient follow-up in office as soon as possible after discharge  Communicated with patient and sister regarding be possible diagnosis, need for further evaluation and follow-up plan

## 2018-08-16 NOTE — PROGRESS NOTES
[facility] Progress Note  Patient: Jazmyne Richmond 77 y o  female   MRN: [id number]  PCP: Juan Manuel Ramirez MD  Unit/Bed#: [address] [place] [address] Encounter: [id number]  Date Of Visit: 08/16/18    Problem List:    Principal Problem:    Ascites  Active Problems:    Ovarian mass, left    Endometrial hyperplasia    DVT, lower extremity, distal, acute, left (HCC)    Pulmonary nodule    Leucocytosis    Bilateral leg edema    Thrombocytosis (HCC)      Assessment & Plan:    * Ascites   Assessment & Plan    New onset  Symptomatic with abdominal distention, nausea and poor appetite on presentation  Likely malignant related to left ovarian mass  No evidence of cirrhosis or CHF  Status post diagnostic and therapeutic paracentesis with 5 6 L of ascitic fluid removal  Symptomatically better  Follow-up cytology  Discussed with patient regarding likely need for periodic paracentesis if symptomatic             DVT, lower extremity, distal, acute, left ([address] )   Assessment & Plan    Acute left peroneal, gastroc and soleal vein  In setting of malignancy and recent travel  Patient denies any history of bleeding  Will start on therapeutic Lovenox  Hematology/oncology evaluation  Monitor          Endometrial hyperplasia   Assessment & Plan    9 mm  in maximal thickness, suggesting the possibility of endometrial hyperplasia or neoplasia  Denies any bleeding or discharge  Follow up with gyn Oncology as above        Ovarian mass, left   Assessment & Plan    Complex left adnexal mass measuring 3 6 x 3 2 x 4 2 cm    significantly elevated   Ref  Range 8/16/2018 06:35    Latest Ref Range: 0 0 - 30 0 U/mL 535 9 (H)     Denies abdominal pain or vaginal discharge  Discussed with GYNOncology, Dr Sang Vega at the time of admission    His office will schedule outpatient follow-up in office as soon as possible after discharge  Communicated with patient and sister regarding be possible diagnosis, need for further evaluation and follow-up plan          Pulmonary nodule   Assessment & Plan    Incidental  Follow-up as outpatient        Thrombocytosis ([address] )   Assessment & Plan    Likely reactive  Monitor        Bilateral leg edema   Assessment & Plan    Secondary to DVT and hypoalbuminemia  Monitor        Leucocytosis   Assessment & Plan    Likely reactive  No evidence of infection  Monitor              VTE Pharmacologic Prophylaxis:   Pharmacologic: Enoxaparin (Lovenox)  Mechanical VTE Prophylaxis in Place: Yes    Patient Centered Rounds: I have performed bedside rounds with nursing staff today  Discussions with Specialists or Other Care Team Provider: Yes    Education and Discussions with Family / Patient:Yes, sister at bedside    Time Spent for Care: 45 minutes  More than 50% of total time spent on counseling and coordination of care as described above  Current Length of Stay: 1 day(s)    Current Patient Status: Inpatient     Discharge Plan:  Home likely in a m  Code Status: Level 1 - Full Code    Certification Statement: The patient will continue to require additional inpatient hospital stay due to Ascites, Ovarain mass and DVT      Subjective:   Underwent paracentesis today with removal of 5 6 L of ascitic fluid  Feels symptomatically better  Now able to eat without nausea vomiting or bloating  Denies chest pain shortness of breath  Less cough  Venous Doppler revealed acute DVT of left peroneal, gastroc and soleal vein    Discussed about results and likely malignancy with patient and sister      Objective:   Comfortable      Negative for Chest Pain, Palpitations, Shortness of Breath, Abdominal Pain, Nausea, Vomiting, Constipation, Diarrhea, Dizziness  All other 10 review of systems negative and without drastic interval changes from yesterday      Vitals:   Temp (24hrs), Av 1 °F (36 7 °C), Min:96 9 °F (36 1 °C), Max:99 °F (37 2 °C)    HR:  [] 87  Resp:  [18-20] 20  BP: (153-191)/(74-85) 191/85  SpO2:  [95 %-98 %] 98 %  Body mass index is 33 66 kg/m²  Input and Output Summary (last 24 hours):     No intake or output data in the 24 hours ending 08/16/18 1434    Physical Exam:     Physical Exam   Constitutional: She is oriented to person, place, and time  She appears well-developed  No distress  HENT:   Head: Normocephalic and atraumatic  Nose: Nose normal    Eyes: Conjunctivae and EOM are normal  Pupils are equal, round, and reactive to light  Neck: Normal range of motion  Neck supple  No JVD present  Cardiovascular: Normal rate, regular rhythm and normal heart sounds  Exam reveals no gallop and no friction rub  No murmur heard  Pulmonary/Chest: Effort normal and breath sounds normal  No respiratory distress  She has no wheezes  She has no rales  She exhibits no tenderness  Abdominal: Soft  Bowel sounds are normal  She exhibits distension (Improved)  There is no tenderness  There is no rebound and no guarding  Musculoskeletal: She exhibits edema (Left more than right)  Neurological: She is alert and oriented to person, place, and time  No cranial nerve deficit  Skin: Skin is warm and dry  No rash noted  Psychiatric: She has a normal mood and affect  Additional Data:     Labs:      Results from last 7 days  Lab Units 08/16/18  0635   WBC Thousand/uL 9 31   HEMOGLOBIN g/dL 10 7*   HEMATOCRIT % 35 6   PLATELETS Thousands/uL 413*   NEUTROS PCT % 75   LYMPHS PCT % 12*   MONOS PCT % 11   EOS PCT % 1       Results from last 7 days  Lab Units 08/16/18  0635   SODIUM mmol/L 140   POTASSIUM mmol/L 3 9   CHLORIDE mmol/L 104   CO2 mmol/L 30   BUN mg/dL 15   CREATININE mg/dL 0 68   CALCIUM mg/dL 8 5   TOTAL PROTEIN g/dL 6 2*   BILIRUBIN TOTAL mg/dL 0 30   ALK PHOS U/L 109   ALT U/L 9*   AST U/L 14   GLUCOSE RANDOM mg/dL 97       Results from last 7 days  Lab Units 08/15/18  1243   INR  1 06       * I Have Reviewed All Lab Data Listed Above  * Additional Pertinent Lab Tests Reviewed:  All Labs For Current Hospital Admission Reviewed    Imaging:  Xr Chest 1 View Portable    Result Date: 8/10/2018  Narrative: CHEST INDICATION:   Cough  COMPARISON:  None EXAM PERFORMED/VIEWS:  XR CHEST PORTABLE 1 image FINDINGS: Cardiomediastinal silhouette appears enlarged  No evidence of heart failure  The lungs are clear  No pneumothorax or pleural effusion  Osseous structures appear within normal limits for patient age  Impression: No acute cardiopulmonary disease  Cardiomegaly  Workstation performed: [id number]     Us Pelvis Complete Non Ob    Result Date: 8/15/2018  Narrative: PELVIC ULTRASOUND, COMPLETE INDICATION:  77years old  ovarian mass, Mieg's syn  Left ovarian mass noted on CT  COMPARISON: CT of the abdomen and pelvis from earlier today, August 15, 2018  TECHNIQUE:   Transabdominal pelvic ultrasound was performed in sagittal and transverse planes with a curvilinear transducer  Additional transvaginal imaging was performed to better evaluate the endometrium and ovaries  Imaging included volumetric sweeps as well as traditional still imaging technique  FINDINGS: UTERUS: Position: Anteverted and anteflexed  Size: 6 4 x 2 2 x 3 4 cm  Myometrium: Normal contour and echogenicity  0 7 x 1 1 cm posterior fundal subserosal fibroid Cervix: Normal in appearance  ENDOMETRIUM:  Thickness: Thickened, measuring 9 mm  in maximal thickness, suggesting the possibility of endometrial hyperplasia or neoplasia  Echotexture: Normal and homogenous in echogenicity with no evidence of endometrial mass or fluid collection  OVARIES/ADNEXA: Right ovary: Not identified with certainty  Left ovary:  No normal left ovary identified  In the left adnexa there is a solid, somewhat lobular mass measuring 4 3 x 4 0 x 2 7 cm, corresponding to the mass noted on CT and suspicious for ovarian neoplasm  Color Doppler demonstrates intralesional vascularity  FREE FLUID: Moderate amount of pelvic ascites       Impression:  1   4 3 x 4 0 x 2 7 cm mass in the left adnexa, most likely a left ovarian tumor  2   Right ovary not identified  3   Thickened endometrium in this postmenopausal patient, consistent with endometrial hyperplasia or neoplasia  4   Moderate amount of ascites  Workstation performed: [id number]     Xr Chest 1 View    Result Date: 8/10/2018  Narrative: CHEST INDICATION:   cough  COMPARISON:  Earlier today EXAM PERFORMED/VIEWS:  XR CHEST 1 VIEW  lateral view 1 image FINDINGS: Correlated with the previously obtained frontally exposure, no abnormality is seen  Cardiomediastinal silhouette appears unremarkable  The lungs are clear  No pneumothorax or pleural effusion  Osseous structures appear within normal limits for patient age  Impression: No acute cardiopulmonary disease  Workstation performed: [id number]     Ct Abdomen Pelvis With Contrast    Result Date: 8/15/2018  Narrative: CT ABDOMEN AND PELVIS WITH IV CONTRAST INDICATION:   Abdominal pain, unspecified  COMPARISON:  None  TECHNIQUE:  CT examination of the abdomen and pelvis was performed  Axial, sagittal, and coronal 2D reformatted images were created from the source data and submitted for interpretation  Radiation dose length product (DLP) for this visit:  990 74 mGy-cm   This examination, like all CT scans performed in the [facility], was performed utilizing techniques to minimize radiation dose exposure, including the use of iterative  reconstruction and automated exposure control  IV Contrast:  100 mL of iohexol (OMNIPAQUE) Enteric Contrast:  Enteric contrast was not administered  FINDINGS: ABDOMEN LOWER CHEST:  Partially imaged right lower lobe nodule measuring 1 2 cm #2/1  Small sliding-type hiatal hernia  LIVER/BILIARY TREE:  Unremarkable  GALLBLADDER:  No calcified gallstones  No pericholecystic inflammatory change  SPLEEN:  Unremarkable  PANCREAS:  Unremarkable  ADRENAL GLANDS:  Unremarkable  KIDNEYS/URETERS:  Parapelvic cysts    No hydronephrosis or perinephric collection  STOMACH AND BOWEL:  Unremarkable  APPENDIX:  No findings to suggest appendicitis  ABDOMINOPELVIC CAVITY:  Large quantity of abdominal and pelvic ascites  No free air  VESSELS:  Unremarkable for patient's age  PELVIS REPRODUCTIVE ORGANS:  Complex left adnexal mass measuring 3 6 x 3 2 x 4 2 cm could represent an ovarian malignancy  Rosella Goldmann URINARY BLADDER:  Unremarkable  ABDOMINAL WALL/INGUINAL REGIONS:  Partially imaged cystic areas in the perineum may represent Bartholin's cysts  Fat-containing left inguinal hernia  OSSEOUS STRUCTURES:  No acute fracture or destructive osseous lesion  Impression: Vague 4 cm left adnexal mass could represent an ovarian malignancy especially with the large quantity of abdominal and pelvic ascites  Consider follow-up with pelvic ultrasound Partially imaged 12 mm right lower lobe pulmonary nodule  The study was marked in [facility] for immediate notification  Workstation performed: [id number]     Imaging Reports Reviewed by myself    Cultures:   Blood Culture: No results found for: BLOODCX  Urine Culture: No results found for: URINECX  Sputum Culture: No components found for: SPUTUMCX  Wound Culture: No results found for: WOUNDCULT    Last 24 Hours Medication List:     Current Facility-Administered Medications:  acetaminophen 650 mg Oral Q6H PRN Martine Hill MD   dextromethorphan-guaiFENesin 10 mL Oral Q4H PRN Martine Hill MD   enoxaparin 1 mg/kg Subcutaneous Q12H Jose Silva MD   ondansetron 4 mg Intravenous Q6H PRN Martine Hill MD        Today, Patient Was Seen By: Martine Hill MD    ** Please Note: Dictation voice to text software may have been used in the creation of this document   **

## 2018-08-16 NOTE — ASSESSMENT & PLAN NOTE
New onset  Symptomatic with abdominal distention, nausea and poor appetite on presentation  Likely malignant related to left ovarian mass  No evidence of cirrhosis or CHF  Status post diagnostic and therapeutic paracentesis with 5 6 L of ascitic fluid removal  Symptomatically better, now tolerating diet well without any abdominal discomfort  Follow-up cytology  Discussed with patient regarding likely need for periodic paracentesis if symptomatic

## 2018-08-16 NOTE — SOCIAL WORK
Met with pt  Resides alone in [facility] style home  Has bedroom and bathroom on first floor  Has no dme  NO hhc  Has been independent and driving  Explained role of cm, no d/c needs  CM reviewed d/c planning process including the following: identifying help at home, patient preference for d/c planning needs, and availability of the treatment team to discuss questions or concerns patient and/or family may have regarding understanding of medications and recognizing signs and symptoms once discharged  CM also encouraged patient to follow up with all recommended appointments after discharge  Patient advised of importance for patient and family to participate in managing patient's medical well being

## 2018-08-16 NOTE — ASSESSMENT & PLAN NOTE
9 mm  in maximal thickness, suggesting the possibility of endometrial hyperplasia or neoplasia    Denies any bleeding or discharge  Follow up with gyn Oncology/Oncology as above

## 2018-08-16 NOTE — PLAN OF CARE
Problem: DISCHARGE PLANNING - CARE MANAGEMENT  Goal: Discharge to post-acute care or home with appropriate resources  INTERVENTIONS:  - Conduct assessment to determine patient/family and health care team treatment goals, and need for post-acute services based on payer coverage, community resources, and patient preferences, and barriers to discharge  - Address psychosocial, clinical, and financial barriers to discharge as identified in assessment in conjunction with the patient/family and health care team  - Arrange appropriate level of post-acute services according to patient's   needs and preference and payer coverage in collaboration with the physician and health care team  - Communicate with and update the patient/family, physician, and health care team regarding progress on the discharge plan  - Arrange appropriate transportation to post-acute venues  Outcome: Adequate for Discharge  Plan:  No d/c needs

## 2018-08-16 NOTE — SEDATION DOCUMENTATION
Paracentesis completed  Clear svetlana liquid noted  Total of 5600 L  Pt tolerating well  Denies pain, discomfort   VSS

## 2018-08-17 VITALS
WEIGHT: 180.78 LBS | SYSTOLIC BLOOD PRESSURE: 129 MMHG | OXYGEN SATURATION: 95 % | HEIGHT: 63 IN | HEART RATE: 94 BPM | DIASTOLIC BLOOD PRESSURE: 63 MMHG | RESPIRATION RATE: 18 BRPM | BODY MASS INDEX: 32.03 KG/M2 | TEMPERATURE: 98.5 F

## 2018-08-17 PROBLEM — R18.8 ASCITES: Status: RESOLVED | Noted: 2018-08-15 | Resolved: 2018-08-17

## 2018-08-17 PROBLEM — D72.829 LEUCOCYTOSIS: Status: RESOLVED | Noted: 2018-08-15 | Resolved: 2018-08-17

## 2018-08-17 LAB
ANION GAP SERPL CALCULATED.3IONS-SCNC: 7 MMOL/L (ref 4–13)
BASOPHILS # BLD AUTO: 0.04 THOUSANDS/ΜL (ref 0–0.1)
BASOPHILS NFR BLD AUTO: 0 % (ref 0–1)
BUN SERPL-MCNC: 11 MG/DL (ref 5–25)
CALCIUM SERPL-MCNC: 7.9 MG/DL (ref 8.3–10.1)
CHLORIDE SERPL-SCNC: 103 MMOL/L (ref 100–108)
CO2 SERPL-SCNC: 30 MMOL/L (ref 21–32)
CREAT SERPL-MCNC: 0.62 MG/DL (ref 0.6–1.3)
EOSINOPHIL # BLD AUTO: 0.13 THOUSAND/ΜL (ref 0–0.61)
EOSINOPHIL NFR BLD AUTO: 1 % (ref 0–6)
ERYTHROCYTE [DISTWIDTH] IN BLOOD BY AUTOMATED COUNT: 13.4 % (ref 11.6–15.1)
GFR SERPL CREATININE-BSD FRML MDRD: 86 ML/MIN/1.73SQ M
GLUCOSE SERPL-MCNC: 117 MG/DL (ref 65–140)
HCT VFR BLD AUTO: 35.6 % (ref 34.8–46.1)
HGB BLD-MCNC: 10.8 G/DL (ref 11.5–15.4)
IMM GRANULOCYTES # BLD AUTO: 0.05 THOUSAND/UL (ref 0–0.2)
IMM GRANULOCYTES NFR BLD AUTO: 1 % (ref 0–2)
LYMPHOCYTES # BLD AUTO: 1.49 THOUSANDS/ΜL (ref 0.6–4.47)
LYMPHOCYTES NFR BLD AUTO: 16 % (ref 14–44)
MCH RBC QN AUTO: 25.5 PG (ref 26.8–34.3)
MCHC RBC AUTO-ENTMCNC: 30.3 G/DL (ref 31.4–37.4)
MCV RBC AUTO: 84 FL (ref 82–98)
MONOCYTES # BLD AUTO: 0.89 THOUSAND/ΜL (ref 0.17–1.22)
MONOCYTES NFR BLD AUTO: 10 % (ref 4–12)
MRSA NOSE QL CULT: NORMAL
NEUTROPHILS # BLD AUTO: 6.67 THOUSANDS/ΜL (ref 1.85–7.62)
NEUTS SEG NFR BLD AUTO: 72 % (ref 43–75)
NRBC BLD AUTO-RTO: 0 /100 WBCS
PLATELET # BLD AUTO: 424 THOUSANDS/UL (ref 149–390)
PMV BLD AUTO: 9.3 FL (ref 8.9–12.7)
POTASSIUM SERPL-SCNC: 3.6 MMOL/L (ref 3.5–5.3)
RBC # BLD AUTO: 4.23 MILLION/UL (ref 3.81–5.12)
SODIUM SERPL-SCNC: 140 MMOL/L (ref 136–145)
WBC # BLD AUTO: 9.27 THOUSAND/UL (ref 4.31–10.16)

## 2018-08-17 PROCEDURE — 99222 1ST HOSP IP/OBS MODERATE 55: CPT | Performed by: INTERNAL MEDICINE

## 2018-08-17 PROCEDURE — 85025 COMPLETE CBC W/AUTO DIFF WBC: CPT | Performed by: INTERNAL MEDICINE

## 2018-08-17 PROCEDURE — 80048 BASIC METABOLIC PNL TOTAL CA: CPT | Performed by: INTERNAL MEDICINE

## 2018-08-17 PROCEDURE — 99239 HOSP IP/OBS DSCHRG MGMT >30: CPT | Performed by: INTERNAL MEDICINE

## 2018-08-17 RX ADMIN — ENOXAPARIN SODIUM 80 MG: 80 INJECTION SUBCUTANEOUS at 20:03

## 2018-08-17 RX ADMIN — ENOXAPARIN SODIUM 90 MG: 100 INJECTION SUBCUTANEOUS at 09:11

## 2018-08-17 NOTE — CONSULTS
Kendy Doll  1940    HEMATOLOGY/ONCOLOGY CONSULTATION REPORT  [facility]    DISCUSSION/SUMMARY:    67-year-old female recently found to have a left ovarian mass  Issues  1  Ovarian mass  The  tumor marker is elevated  Cytology report from the ascitic fluid is pending  High on the differential diagnosis is a GYN (ovarian) primary  If patient is stable and there are no other medical issues, Mrs Neil De La Rosa can be discharged and will be followed in the (likely GYN Oncology, possibly medical Oncology) office  Below is the 2 2018 NCCN guidelines for epithelial ovarian cancer/fallopian tube cancer/primary peritoneal cancer  2  Pulmonary nodule  CT scan of the abdomen demonstrated a vague right lower lobe pulmonary nodule  This needs to be clarified; the obvious concern would be metastatic disease  If not already performed or scheduled, patient should have a dedicated CT scan of the chest     3  CBC abnormalities  The hemoglobin level is mildly to moderately decreased; other CBC parameters are not consistent with iron deficiency (but iron studies can be checked later if necessary)  The anemia may be due to the malignant process  The platelet count is slightly elevated and is likely reactive  4  Left lower extremity DVT  This is likely associated with the (presumed) malignancy  Patient is on Lovenox  Hopefully patient can continue with Lovenox as it is easily manipulated for biopsies, surgical procedures etc   Social service/ is looking into the specifics  A direct oral thrombin inhibitor would be 2nd best; Coumadin would be difficult to manipulate with the pending procedures and the possibility that patient may need repeat paracentesis in the future  The above was discussed with the patient; all questions were answered    Mrs Neil De La Rosa demonstrated a very good understanding of the seriousness of the situation and the fact that she likely has a malignancy that will require additional workup and treatment  I will make sure patient gets to 1 of the GYN oncologists in [address] [place] [address] after discharge  Please do not hesitate to contact me if you have any questions or need additional information  Thank you for this consult     _______________________________________________________________________________________________    Chief Complaint   Patient presents with    Abdominal Pain     mid abd pain since about the 27th of July, intermittent nausea, abd distention, soft stools     History of Present Illness:    26-year-old female with no significant past medical history who presents with abdominal distention over the past few weeks  Mrs Kt Thrasher noticed that her abdomen was getting larger and that close were more difficult to fit over the past few weeks  Patient also noticed some lower extremity swelling  Appetite was decreased, mild nausea but no vomiting  Patient denies any GI or  issues  No GYN issues or bleeding  Mrs Kt Thrasher also had progressive fatigue  Patient was seen by her PCP; physical exam demonstrated abdominal fluid patient was sent to the emergency room and admitted  Workup demonstrated an ovarian mass as well as ascites  Patient has undergone paracentesis and feels much better  Review of Systems   Constitutional: Positive for activity change and fatigue  HENT: Negative  Eyes: Negative  Respiratory: Negative  Cardiovascular: Negative  Gastrointestinal: Positive for abdominal distention  Endocrine: Negative  Genitourinary: Negative  Musculoskeletal: Negative  Skin: Negative  Allergic/Immunologic: Negative  Neurological: Negative  Hematological: Negative  Psychiatric/Behavioral: Negative  All other systems reviewed and are negative       Patient Active Problem List   Diagnosis    Ascites    Ovarian mass, left    Endometrial hyperplasia    Pulmonary nodule    Leucocytosis    Bilateral leg edema    Thrombocytosis ([facility] [address] )    DVT, lower extremity, distal, acute, left ([facility] [address] )     History reviewed  No pertinent past medical history  Past Surgical History:   Procedure Laterality Date    APPENDECTOMY      IR PARACENTESIS  8/16/2018     Family History   Problem Relation Age of Onset    Heart disease Father     Cancer Brother      Social History     Social History    Marital status:      Spouse name: N/A    Number of children: N/A    Years of education: N/A     Occupational History    Not on file  Social History Main Topics    Smoking status: Never Smoker    Smokeless tobacco: Never Used    Alcohol use No    Drug use: No    Sexual activity: No     Other Topics Concern    Not on file     Social History Narrative    No narrative on file       Current Facility-Administered Medications:     acetaminophen (TYLENOL) tablet 650 mg, 650 mg, Oral, Q6H PRN, Huang Morin MD    dextromethorphan-guaiFENesin (ROBITUSSIN DM)  mg/5 mL oral syrup 10 mL, 10 mL, Oral, Q4H PRN, Huang Morin MD    enoxaparin (LOVENOX) subcutaneous injection 80 mg, 1 mg/kg, Subcutaneous, Q12H [address], Huang Morin MD    ondansetron (ZOFRAN) injection 4 mg, 4 mg, Intravenous, Q6H PRN, Huang Morin MD    No Known Allergies    Vitals:    08/17/18 0841   BP: 153/68   Pulse: 99   Resp: 18   Temp: 98 3 °F (36 8 °C)   SpO2: 96%     Physical Exam   Constitutional: She is oriented to person, place, and time  She appears well-developed and well-nourished  HENT:   Head: Normocephalic and atraumatic  Right Ear: External ear normal    Left Ear: External ear normal    Nose: Nose normal    Mouth/Throat: Oropharynx is clear and moist    Eyes: Conjunctivae and EOM are normal  Pupils are equal, round, and reactive to light  Neck: Normal range of motion  Neck supple  Cardiovascular: Normal rate, regular rhythm, normal heart sounds and intact distal pulses  Pulmonary/Chest: Effort normal and breath sounds normal    Abdominal: Soft  Bowel sounds are normal    Obese + distended, nontender, +bowel sounds, cannot palpate liver or spleen   Musculoskeletal: Normal range of motion  Neurological: She is alert and oriented to person, place, and time  She has normal reflexes  Skin: Skin is warm  Good color, moist, no petechiae or ecchymoses   Psychiatric: She has a normal mood and affect  Her behavior is normal  Judgment and thought content normal    Extremities:  0-1 +bilateral lower extremity edema, no cords, pulses are 1+  Lymphatics: No adenopathy in the neck, supraclavicular region, axilla and groin bilaterally    Labs    Results for Holley Pickett (MRN [id number]) as of 8/17/2018 14:49   Ref  Range 8/17/2018 08:13   WBC Latest Ref Range: 4 31 - 10 16 Thousand/uL 9 27   RBC Latest Ref Range: 3 81 - 5 12 Million/uL 4 23   Hemoglobin Latest Ref Range: 11 5 - 15 4 g/dL 10 8 (L)   HCT Latest Ref Range: 34 8 - 46 1 % 35 6   MCV Latest Ref Range: 82 - 98 fL 84   MCH Latest Ref Range: 26 8 - 34 3 pg 25 5 (L)   MCHC Latest Ref Range: 31 4 - 37 4 g/dL 30 3 (L)   RDW Latest Ref Range: 11 6 - 15 1 % 13 4   Platelets Latest Ref Range: 149 - 390 Thousands/uL 424 (H)     Results for Holley Pickett (MRN [id number]) as of 8/17/2018 14:49   Ref   Range 8/16/2018 06:35   Sodium Latest Ref Range: 136 - 145 mmol/L 140   Potassium Latest Ref Range: 3 5 - 5 3 mmol/L 3 9   Chloride Latest Ref Range: 100 - 108 mmol/L 104   CO2 Latest Ref Range: 21 - 32 mmol/L 30   Anion Gap Latest Ref Range: 4 - 13 mmol/L 6   BUN Latest Ref Range: 5 - 25 mg/dL 15   Creatinine Latest Ref Range: 0 60 - 1 30 mg/dL 0 68   Glucose Latest Ref Range: 65 - 140 mg/dL 97   Calcium Latest Ref Range: 8 3 - 10 1 mg/dL 8 5   AST (SGOT) Latest Ref Range: 5 - 45 U/L 14   ALT Latest Ref Range: 12 - 78 U/L 9 (L)   ALK PHOS Latest Ref Range: 46 - 116 U/L 109   Total Protein Latest Ref Range: 6 4 - 8 2 g/dL 6 2 (L)   Albumin Latest Ref Range: 3 5 - 5 0 g/dL 2 2 (L)   Total Bilirubin Latest Ref Range: 0 20 - 1 00 mg/dL 0 30   eGFR Latest Units: ml/min/1 73sq m 83       Results for Thalia Davis (MRN [id number]) as of 8/17/2018 14:49   Ref  Range 8/16/2018 06:35    Latest Ref Range: 0 0 - 30 0 U/mL 535 9 (H)       Imaging    08/16/2018 vascular lower limb venous duplex study bilateral    Impression:  RIGHT LOWER LIMB:  No evidence of acute or chronic deep vein thrombosis  No evidence of superficial thrombophlebitis noted  Doppler evaluation shows a normal response to augmentation maneuvers  Popliteal, posterior tibial and anterior tibial arterial Doppler waveforms are  triphasic  LEFT LOWER LIMB:  Evidence of acute deep vein thrombosis in one of two peroneal vein,soleal vein,  gastrocnemius vein in mid calf  No evidence of superficial thrombophlebitis noted  Doppler evaluation shows a normal response to augmentation maneuvers  Popliteal, posterior tibial and anterior tibial arterial Doppler waveforms are  triphasic  8/15/18 ultrasound pelvic complete    1   4 3 x 4 0 x 2 7 cm mass in the left adnexa, most likely a left ovarian tumor  2   Right ovary not identified  3   Thickened endometrium in this postmenopausal patient, consistent with endometrial hyperplasia or neoplasia  4   Moderate amount of ascites  08/15/2018 CT scan abdomen pelvis    Vague 4 cm left adnexal mass could represent an ovarian malignancy especially with the large quantity of abdominal and pelvic ascites  Consider follow-up with pelvic ultrasound     Partially imaged 12 mm right lower lobe pulmonary nodule

## 2018-08-17 NOTE — PLAN OF CARE
DISCHARGE PLANNING     Discharge to home or other facility with appropriate resources Adequate for Discharge        DISCHARGE PLANNING - CARE MANAGEMENT     Discharge to post-acute care or home with appropriate resources Adequate for Discharge        INFECTION - ADULT     Absence or prevention of progression during hospitalization Adequate for Discharge     Absence of fever/infection during neutropenic period Adequate for Discharge        Knowledge Deficit     Patient/family/caregiver demonstrates understanding of disease process, treatment plan, medications, and discharge instructions Adequate for Discharge        PAIN - ADULT     Verbalizes/displays adequate comfort level or baseline comfort level Adequate for Discharge

## 2018-08-17 NOTE — ASSESSMENT & PLAN NOTE
Acute left peroneal, gastroc and soleal vein  In setting of malignancy and recent travel  Patient denies any history of bleeding  Started on Lovenox, tolerated the  Educated about self administration   Patient does not have prescription plan at present but patient will be able to afford Lovenox as discounted price  with the coupon provided  Confirmed with pharmacy  Hematology/oncology evaluation appreciated, Lovenox preferred option

## 2018-08-17 NOTE — DISCHARGE SUMMARY
Discharge Summary - [address] [facility]    Patient Information: Brain Wood 77 y o  female MRN: [id number]  Unit/Bed#: [address] [place] [address] Encounter: [id number]    Discharging Physician / Practitioner: Demario Gomes MD  PCP: Lu Randall MD  Admission Date: 8/15/2018  Discharge Date: 08/17/18    Reason for Admission: Abdominal Pain (mid abd pain since about the 27th of July, intermittent nausea, abd distention, soft stools)      Discharge Diagnoses:     Principal Problem (Resolved):    Ascites  Active Problems:    Ovarian mass, left    Endometrial hyperplasia    DVT, lower extremity, distal, acute, left (HCC)    Pulmonary nodule    Bilateral leg edema    Thrombocytosis ([address] )  Resolved Problems:    Leucocytosis        * Ascites-resolved as of 8/17/2018   Assessment & Plan    New onset  Symptomatic with abdominal distention, nausea and poor appetite on presentation  Likely malignant related to left ovarian mass  No evidence of cirrhosis or CHF  Status post diagnostic and therapeutic paracentesis with 5 6 L of ascitic fluid removal  Symptomatically better, now tolerating diet well without any abdominal discomfort  Follow-up cytology  Discussed with patient regarding likely need for periodic paracentesis if symptomatic             DVT, lower extremity, distal, acute, left ([address] )   Assessment & Plan    Acute left peroneal, gastroc and soleal vein  In setting of malignancy and recent travel  Patient denies any history of bleeding  Started on Lovenox, tolerated the  Educated about self administration   Patient does not have prescription plan at present but patient will be able to afford Lovenox as discounted price  with the coupon provided  Confirmed with pharmacy  Hematology/oncology evaluation appreciated, Lovenox preferred option  Endometrial hyperplasia   Assessment & Plan    9 mm  in maximal thickness, suggesting the possibility of endometrial hyperplasia or neoplasia    Denies any bleeding or discharge  Follow up with gyn Oncology/Oncology as above        Ovarian mass, left   Assessment & Plan    Complex left adnexal mass measuring 3 6 x 3 2 x 4 2 cm    significantly elevated   Ref  Range 8/16/2018 06:35    Latest Ref Range: 0 0 - 30 0 U/mL 535 9 (H)     Denies abdominal pain or vaginal discharge  Discussed with GYNOncology, Dr Kylah Santos at the time of admission  His office will schedule outpatient follow-up in office as soon as possible after discharge  Communicated with patient and sister regarding be possible diagnosis, need for further evaluation and follow-up plan          Pulmonary nodule   Assessment & Plan    Incidental, possibly related to above  Follow-up as outpatient for CT chest        Thrombocytosis [facility])   Assessment & Plan    Likely reactive  Monitor        Bilateral leg edema   Assessment & Plan    Secondary to DVT and hypoalbuminemia  Monitor        Leucocytosis-resolved as of 8/17/2018   Assessment & Plan    Likely reactive  No evidence of infection              Consultations During Hospital Stay:  IP CONSULT TO ONCOLOGY    Procedures Performed:     · Paracentesis    Significant Findings:     ·  level elevated    Imaging while in hospital:    Xr Chest 1 View Portable    Result Date: 8/10/2018  Narrative: CHEST INDICATION:   Cough  COMPARISON:  None EXAM PERFORMED/VIEWS:  XR CHEST PORTABLE 1 image FINDINGS: Cardiomediastinal silhouette appears enlarged  No evidence of heart failure  The lungs are clear  No pneumothorax or pleural effusion  Osseous structures appear within normal limits for patient age  Impression: No acute cardiopulmonary disease  Cardiomegaly  Workstation performed: [id number]     Us Pelvis Complete Non Ob    Result Date: 8/15/2018  Narrative: PELVIC ULTRASOUND, COMPLETE INDICATION:  77years old  ovarian mass, Mieg's syn  Left ovarian mass noted on CT  COMPARISON: CT of the abdomen and pelvis from earlier today, August 15, 2018   TECHNIQUE: Transabdominal pelvic ultrasound was performed in sagittal and transverse planes with a curvilinear transducer  Additional transvaginal imaging was performed to better evaluate the endometrium and ovaries  Imaging included volumetric sweeps as well as traditional still imaging technique  FINDINGS: UTERUS: Position: Anteverted and anteflexed  Size: 6 4 x 2 2 x 3 4 cm  Myometrium: Normal contour and echogenicity  0 7 x 1 1 cm posterior fundal subserosal fibroid Cervix: Normal in appearance  ENDOMETRIUM:  Thickness: Thickened, measuring 9 mm  in maximal thickness, suggesting the possibility of endometrial hyperplasia or neoplasia  Echotexture: Normal and homogenous in echogenicity with no evidence of endometrial mass or fluid collection  OVARIES/ADNEXA: Right ovary: Not identified with certainty  Left ovary:  No normal left ovary identified  In the left adnexa there is a solid, somewhat lobular mass measuring 4 3 x 4 0 x 2 7 cm, corresponding to the mass noted on CT and suspicious for ovarian neoplasm  Color Doppler demonstrates intralesional vascularity  FREE FLUID: Moderate amount of pelvic ascites  Impression:  1   4 3 x 4 0 x 2 7 cm mass in the left adnexa, most likely a left ovarian tumor  2   Right ovary not identified  3   Thickened endometrium in this postmenopausal patient, consistent with endometrial hyperplasia or neoplasia  4   Moderate amount of ascites  Workstation performed: [id number]     Ir Paracentesis    Result Date: 8/16/2018  Narrative: Ultrasound-guided paracentesis Clinical History: Patient with an ovarian mass and ascites presenting for diagnostic and therapeutic paracentesis   Technique: Patient was brought to the interventional radiology area and placed supine on the stretcher  After a brief ultrasound examination was performed to localize a pocket of fluid,an area on the skin of the right lower quadrant was prepped, and draped in the usual sterile fashion   Lidocaine was administered to the skin and a small skin incision was made  A 5 [place] multisidehole catheter  was advanced into the fluid and 5600 mL clear yellow ascites was aspirated  Specimens were sent to the  lab as requested   After the procedure, the catheter was removed and a bandage applied to the site  The patient tolerated the procedure well and suffered no complications  Impression: Impression: 1  Successful ultrasound-guided paracentesis yielding 5600 mL clear yellow ascites   2  Specimens were sent to the laboratory as described above  Workstation performed: [id number]     Xr Chest 1 View    Result Date: 8/10/2018  Narrative: CHEST INDICATION:   cough  COMPARISON:  Earlier today EXAM PERFORMED/VIEWS:  XR CHEST 1 VIEW  lateral view 1 image FINDINGS: Correlated with the previously obtained frontally exposure, no abnormality is seen  Cardiomediastinal silhouette appears unremarkable  The lungs are clear  No pneumothorax or pleural effusion  Osseous structures appear within normal limits for patient age  Impression: No acute cardiopulmonary disease  Workstation performed: [id number]     Vas Lower Limb Venous Duplex Study, Complete Bilateral    Result Date: 8/16/2018  Narrative:  [facility] REPORT CLINICAL: Indications: Swelling of Limb [R22 4]  Patient presents with left lower extremity edema x several days  FINDINGS:  Segment        Right            Left                          Impression       Impression              CFV            Normal (Patent)  Normal (Patent)         Peroneal                        Non Occlusive Thrombus  Calf Veins                      Non Occlusive Thrombus  Gastrocnemius                   Non Occlusive Thrombus     CONCLUSION: Impression: RIGHT LOWER LIMB: No evidence of acute or chronic deep vein thrombosis  No evidence of superficial thrombophlebitis noted  Doppler evaluation shows a normal response to augmentation maneuvers   Popliteal, posterior tibial and anterior tibial arterial Doppler waveforms are triphasic  LEFT LOWER LIMB: Evidence of acute deep vein thrombosis in one of two peroneal vein,soleal vein, gastrocnemius vein in mid calf  No evidence of superficial thrombophlebitis noted  Doppler evaluation shows a normal response to augmentation maneuvers  Popliteal, posterior tibial and anterior tibial arterial Doppler waveforms are triphasic  Technical findings were given to Dr Jane Musa 16:30  SIGNATURE: Electronically Signed by: Samy Peña MD, [address] on 2018-08-16 11:20:05 PM    Ct Abdomen Pelvis With Contrast    Result Date: 8/15/2018  Narrative: CT ABDOMEN AND PELVIS WITH IV CONTRAST INDICATION:   Abdominal pain, unspecified  COMPARISON:  None  TECHNIQUE:  CT examination of the abdomen and pelvis was performed  Axial, sagittal, and coronal 2D reformatted images were created from the source data and submitted for interpretation  Radiation dose length product (DLP) for this visit:  990 74 mGy-cm   This examination, like all CT scans performed in the [facility], was performed utilizing techniques to minimize radiation dose exposure, including the use of iterative  reconstruction and automated exposure control  IV Contrast:  100 mL of iohexol (OMNIPAQUE) Enteric Contrast:  Enteric contrast was not administered  FINDINGS: ABDOMEN LOWER CHEST:  Partially imaged right lower lobe nodule measuring 1 2 cm #2/1  Small sliding-type hiatal hernia  LIVER/BILIARY TREE:  Unremarkable  GALLBLADDER:  No calcified gallstones  No pericholecystic inflammatory change  SPLEEN:  Unremarkable  PANCREAS:  Unremarkable  ADRENAL GLANDS:  Unremarkable  KIDNEYS/URETERS:  Parapelvic cysts  No hydronephrosis or perinephric collection  STOMACH AND BOWEL:  Unremarkable  APPENDIX:  No findings to suggest appendicitis  ABDOMINOPELVIC CAVITY:  Large quantity of abdominal and pelvic ascites  No free air  VESSELS:  Unremarkable for patient's age   PELVIS REPRODUCTIVE ORGANS:  Complex left adnexal mass measuring 3 6 x 3 2 x 4 2 cm could represent an ovarian malignancy  Ferol Abraham URINARY BLADDER:  Unremarkable  ABDOMINAL WALL/INGUINAL REGIONS:  Partially imaged cystic areas in the perineum may represent Bartholin's cysts  Fat-containing left inguinal hernia  OSSEOUS STRUCTURES:  No acute fracture or destructive osseous lesion  Impression: Vague 4 cm left adnexal mass could represent an ovarian malignancy especially with the large quantity of abdominal and pelvic ascites  Consider follow-up with pelvic ultrasound Partially imaged 12 mm right lower lobe pulmonary nodule  The study was marked in [facility] for immediate notification  Workstation performed: [id number]       Incidental Findings:   Imaging as above      Test Results Pending at Discharge (will require follow up):   · As per After Visit Summary     Outpatient Tests Requested:  · CT chest    Complications:  None    Hospital Course:     Miguelina Walsh is a 77 y o  female patient with no significant past medical history who originally presented to the hospital on 8/15/2018 due to progressive abdominal distention over last 2-3 weeks  She reported associated nausea driving and poor appetite  Patient was evaluated emergency room noted to have CT scan findings as above and subsequently admitted for further evaluation workup  Patient underwent diagnostic and therapeutic paracentesis with symptomatic improvement   level was elevated  Discussed with family and patient regarding likely diagnosis of malignancy  Case was also discussed with gyn Oncology for follow-up after discharge  Patient was also noted to have below calf DVT of left leg  Ca 125 level was elevated  Patient was seen by Hematology, recommended treatment with Lovenox due to likelihood of underlying malignancy and need for further paracentesis and operative interventions  Patient was educated about the Lovenox administration    Currently patient does not have prescription plan but was able to afford Lovenox 24 days supply with the discount coupon  Patient was given resources for prescription plans  Patient will follow up with Oncology after discharge  Please see above list of diagnoses and related plan for additional information  Condition at Discharge: stable     Discharge Day Visit / Exam:     Subjective:  Feels much better  Tolerating diet well  Regular bowel movement   No bleeding  Denies abdominal pain or distention  Denies chest pain or shortness    Vitals: Blood Pressure: 129/63 (08/17/18 1531)  Pulse: 94 (08/17/18 1531)  Temperature: 98 5 °F (36 9 °C) (08/17/18 1531)  Temp Source: Oral (08/17/18 1531)  Respirations: 18 (08/17/18 1531)  Height: 5' 3" (160 cm) (08/15/18 1623)  Weight - Scale: 82 kg (180 lb 12 4 oz) (08/17/18 1008)  SpO2: 95 % (08/17/18 1531)  Exam:   Physical Exam   Constitutional: She appears well-developed  No distress  HENT:   Head: Normocephalic and atraumatic  Nose: Nose normal    Eyes: Conjunctivae and EOM are normal  Pupils are equal, round, and reactive to light  Neck: Normal range of motion  Neck supple  No JVD present  Cardiovascular: Normal rate, regular rhythm and normal heart sounds  Exam reveals no gallop and no friction rub  No murmur heard  Pulmonary/Chest: Effort normal and breath sounds normal  No respiratory distress  She has no wheezes  She has no rales  She exhibits no tenderness  Abdominal: Soft  Bowel sounds are normal  She exhibits no distension  There is no tenderness  There is no rebound and no guarding  Musculoskeletal: She exhibits edema  Neurological: She is alert  No cranial nerve deficit  Skin: Skin is warm and dry  No rash noted  Psychiatric: She has a normal mood and affect  Discharge instructions/Information to patient and family:(Discharge Medications and Follow up):   See after visit summary for information provided to patient and family        Provisions for Follow-Up Care:  See after visit summary for information related to follow-up care and any pertinent home health orders  Disposition: Home with Family    Planned Readmission:  No     Discharge Statement:  I spent 40 minutes discharging the patient  This time was spent on the day of discharge  I had direct contact with the patient on the day of discharge  Greater than 50% of the total time was spent examining patient, answering all patient questions, arranging and discussing plan of care with patient as well as directly providing post-discharge instructions  Additional time then spent on discharge activities  Discharge Medications:  See after visit summary for reconciled discharge medications provided to patient and family  ** Please Note:  Dictation voice to text software may have been used in the creation of this document   **

## 2018-08-18 NOTE — NURSING NOTE
Patient demonstrated administration of lovenox  AVS reviewed with patient and family  IV access removed  Patient left with her brother along with her belongings

## 2018-08-19 LAB
BACTERIA SPEC BFLD CULT: NO GROWTH
GRAM STN SPEC: NORMAL
GRAM STN SPEC: NORMAL

## 2018-08-20 PROBLEM — R97.1 ELEVATED CA-125: Status: ACTIVE | Noted: 2018-08-20

## 2018-08-21 ENCOUNTER — HOSPITAL ENCOUNTER (EMERGENCY)
Facility: HOSPITAL | Age: 78
Discharge: HOME | End: 2018-08-21
Attending: EMERGENCY MEDICINE | Admitting: EMERGENCY MEDICINE
Payer: MEDICARE

## 2018-08-21 VITALS
RESPIRATION RATE: 18 BRPM | DIASTOLIC BLOOD PRESSURE: 90 MMHG | BODY MASS INDEX: 31.89 KG/M2 | HEART RATE: 118 BPM | WEIGHT: 180 LBS | OXYGEN SATURATION: 97 % | TEMPERATURE: 98.1 F | HEIGHT: 63 IN | SYSTOLIC BLOOD PRESSURE: 188 MMHG

## 2018-08-21 DIAGNOSIS — R18.8 ASCITES: Primary | ICD-10-CM

## 2018-08-21 PROCEDURE — 99283 EMERGENCY DEPT VISIT LOW MDM: CPT

## 2018-08-21 NOTE — ED PROVIDER NOTES
History  Chief Complaint   Patient presents with    Ascites     pt has a left ovarian mass that fills with fluid and needs to be drained     Patient is a 69-year-old female who presents with complaint of some increasing and her abdominal size  Patient was recently hospitalized for a new ovarian mass and a large amount of ascites in her belly  Patient had a paracentesis and she has got a follow-up appointment with the oncologist for this ovarian mass tomorrow  Patient was informed by the hospitalist when she was discharged that if she thought she was filling up with fluid she could just come to the emergency room and have it taken out  Patient denies any pain, no shortness of breath, no difficulty urinating, no difficulty with bowels  Patient is eating and drinking without any difficulty  Prior to Admission Medications   Prescriptions Last Dose Informant Patient Reported? Taking?   enoxaparin (LOVENOX) 80 mg/0 8 mL   No No   Sig: Inject 0 8 mL (80 mg total) under the skin every 12 (twelve) hours for 48 doses      Facility-Administered Medications: None       Past Medical History:   Diagnosis Date    Ovarian mass, left        Past Surgical History:   Procedure Laterality Date    APPENDECTOMY      CLAVICLE SURGERY      FOOT SURGERY      IR PARACENTESIS  8/16/2018       Family History   Problem Relation Age of Onset    Heart disease Father     Cancer Brother      I have reviewed and agree with the history as documented  Social History   Substance Use Topics    Smoking status: Never Smoker    Smokeless tobacco: Never Used    Alcohol use No        Review of Systems   Constitutional: Negative for chills and fever  HENT: Negative for facial swelling and trouble swallowing  Respiratory: Negative for chest tightness and shortness of breath  Cardiovascular: Negative for chest pain  Gastrointestinal: Positive for abdominal distention  Negative for abdominal pain, nausea and vomiting  Genitourinary: Negative for dysuria and flank pain  Musculoskeletal: Negative for back pain and neck pain  Skin: Negative  Neurological: Negative for weakness and numbness  Hematological: Negative  Psychiatric/Behavioral: Negative  Physical Exam  Physical Exam   Constitutional: She is oriented to person, place, and time  She appears well-developed and well-nourished  No distress  HENT:   Head: Normocephalic and atraumatic  Cardiovascular: Normal rate  Pulmonary/Chest: Effort normal and breath sounds normal    Abdominal: Soft  She exhibits distension and ascites  There is no tenderness  Musculoskeletal: Normal range of motion  She exhibits no edema  Neurological: She is alert and oriented to person, place, and time  Skin: Skin is warm and dry  Psychiatric: She has a normal mood and affect  Nursing note and vitals reviewed  Vital Signs  ED Triage Vitals [08/21/18 1634]   Temperature Pulse Respirations Blood Pressure SpO2   98 1 °F (36 7 °C) (!) 118 18 (!) 188/90 97 %      Temp Source Heart Rate Source Patient Position - Orthostatic VS BP Location FiO2 (%)   Oral Monitor Lying Left arm --      Pain Score       No Pain           Vitals:    08/21/18 1634   BP: (!) 188/90   Pulse: (!) 118   Patient Position - Orthostatic VS: Lying       Visual Acuity      ED Medications  Medications - No data to display    Diagnostic Studies  Results Reviewed     None                 No orders to display              Procedures  Procedures       Phone Contacts  ED Phone Contact    ED Course                               MDM  Number of Diagnoses or Management Options  Ascites:   Diagnosis management comments: Because of the time  That are there is no interventional Radiology available at this point  Patient is asymptomatic at this point, discussed that this condition out could be treated as an outpatient basis    Patient was given the information she is going to follow up with interventional Radiology and our paracentesis as an outpatient after her oncology appointment tomorrow  Patient states understanding is agreement the assessment plan  CritCare Time    Disposition  Final diagnoses:   Ascites     Time reflects when diagnosis was documented in both MDM as applicable and the Disposition within this note     Time User Action Codes Description Comment    8/21/2018  5:31 PM Marychuy Garciaw Add [R18 8] Ascites       ED Disposition     ED Disposition Condition Comment    Discharge  Wilbert Chi discharge to home/self care  Condition at discharge: Stable        Follow-up Information     Follow up With Specialties Details Why Contact Info Additional [address] [facility] Interventional Radiology Radiology Call in 1 day  [address] [facility] [address]  [phone] [facility], [address] & [place], [place], [address]          Discharge Medication List as of 8/21/2018  5:32 PM      CONTINUE these medications which have NOT CHANGED    Details   enoxaparin (LOVENOX) 80 mg/0 8 mL Inject 0 8 mL (80 mg total) under the skin every 12 (twelve) hours for 48 doses, Starting Fri 8/17/2018, Until Mon 9/10/2018, Print           No discharge procedures on file      ED Provider  Electronically Signed by           Guy Lal MD  08/22/18 2383

## 2018-08-21 NOTE — DISCHARGE INSTRUCTIONS
Ascites   WHAT YOU NEED TO KNOW:   What is ascites? Ascites is excess fluid in your lower abdomen  The fluid causes swelling  Ascites can signal a more serious problem in your body  What causes ascites? · Liver disease, such as cirrhosis or hepatitis     · Cancer     · Congestive heart failure     · Blood clots in the veins that enter and leave the liver  What are the signs and symptoms of ascites? · Rapid weight gain and swelling     · Swollen abdomen     · Shortness of breath     · Stretch marks and bulging veins on the abdomen     · Nausea     · A feeling of fullness after eating little food  How is ascites diagnosed? Your healthcare provider will ask you to lie on your back while he taps or presses on your abdomen  He will ask about your symptoms and when they started  He will ask about alcohol or IV (intravenous) drug use, sexual activity, and any blood transfusions you have had  Tell him if you have had heart problems or cancer  · Blood and urine tests:  Healthcare providers will test samples of your blood and urine to see how your liver and kidneys are working  · Abdominal ultrasound:  Gel helps your healthcare provider move a sensor over your abdomen during an ultrasound  The sensor uses sound waves to show pictures of your organs on a monitor  · CT scan:  A CT scan uses a computer to take x-rays of the organs and blood vessels in your abdomen  You may be given dye called contrast to help the pictures show up better  Tell the healthcare provider if you are allergic to iodine or seafood  You may also be allergic to the dye  · Ascites fluid test:  Healthcare providers use a needle to take a sample of fluid from your abdomen for testing  This procedure is called a fluid tap or paracentesis  Numbing medicine makes you more comfortable during this test  Tests on the fluid help find the cause of your ascites and screen for infection       · 24-hour urine collection:  You will use a container to hold all of your urine collected over 24 hours  The urine must be kept cold until it is tested  · Other tests:  Tests such as endoscopy or laparoscopy use a scope (tube) with a camera on its tip to see your lower esophagus or liver  Anesthesia medicine will keep you numb or asleep during the procedure  Healthcare providers will look for bleeding or blood clots  A tissue sample (biopsy) may be taken  How is ascites treated? Ascites treatment usually combines medicines with changes to your eating and drinking habits  Other treatments are used if your ascites does not improve or if the condition that caused the ascites is getting worse  · Medicines:      ¨ Diuretics:  Diuretics help decrease the fluid in your abdomen by causing you to urinate more often  You will lose weight as the fluid decreases  Ask your healthcare provider how much weight you should expect to lose each day  ¨ Antibiotics: These medicines are used to prevent or fight infections caused by bacteria  ¨ Vaccines or antiviral medicines:  When ascites is caused by hepatitis (a virus that attacks your liver), antiviral medicines may help prevent more damage to your liver  Healthcare providers may give you vaccines to prevent hepatitis  · Sodium and liquid restriction:      ¨ Sodium:  You must lower the amount of sodium (salt) you eat and drink to reduce the fluid in your abdomen  Sodium is in many foods, even when you cannot taste it  You may need to be on a 2-gram sodium diet, which limits your sodium to less than half a teaspoon each day  Ask your healthcare provider for more information about low-sodium diets  ¨ Liquid:  Healthcare providers may ask you to limit your liquids to about 34 ounces (about 2 pints) a day if you are not losing fluid with a low-sodium diet  · Procedures and surgeries:      ¨ Paracentesis:  Your healthcare provider drains the fluid out of your abdomen through a needle   You will receive numbing medicine before the procedure  Paracentesis can quickly remove quarts of fluid  Paracentesis may be repeated if the ascites does not respond to other treatments  ¨ Transjugular intrahepatic portosystemic shunt: This procedure is also called TIPS  TIPS can treat large ascites when you cannot have paracentesis  Your healthcare provider uses a catheter (plastic tube) to increase blood flow through your liver  This helps to reduce the fluid in your abdomen  ¨ Liver transplant:  Sometimes your liver is so damaged that a liver transplant is the only long-term treatment option  ¨ Peritoneovenous shunt: This procedure drains the fluid into a large vein to be absorbed by the body  The shunt is a tube placed in your abdomen and connected to the vein  Healthcare providers may use this procedure if you cannot have a liver transplant or other ascites treatments  What are the risks of ascites? · The excess fluid may affect your ability to breathe  Your swollen abdomen can make it hard to eat  Hernias (weak areas in the muscles on your abdomen) may form from the pressure of the fluid  Treatment can change your electrolyte (body chemical) balance  Electrolyte changes can cause confusion, drowsiness, and thinking and movement problems that may lead to coma  You may get an infection called spontaneous bacterial peritonitis in your abdomen that can be life-threatening  You will need more medicines or treatments if you develop these problems  · Even with treatment, the condition that caused your ascites may become life-threatening  Bleeding in the esophagus or liver or kidney failure can occur  How do I manage ascites? · Do not drink alcohol:  Alcohol worsens the damage to your liver  Your ascites may improve or even go away after you stop drinking  Do not take medicines that contain alcohol  Ask your healthcare provider for information if you need help to quit drinking alcohol       · Follow your low-sodium plan:  A dietitian can help you create a low-sodium diet  He may suggest lemon juice or herbs to flavor your food  Avoid salted butter or margarine, milk, cheese, and canned or frozen foods that are not made for low-salt diets  Ask your healthcare provider or dietitian before you use salt substitutes  · Write down your daily weight: You will need to track your weight at home so healthcare providers can see if treatment is working  Weigh yourself each day  Ask your healthcare provider how much weight you should be losing  Your healthcare provider will want to know if you are losing too much or too little weight  · Ask about NSAIDs:  NSAIDs are over-the-counter pain and fever medicines  You may not urinate enough to take NSAIDs safely  Ask your healthcare provider if NSAIDs are safe for you  Follow directions carefully  These medicines can cause stomach bleeding or kidney problems if they are not taken correctly  · Limit activity: Too much physical activity may make your ascites worse  Ask your healthcare provider if you have any limits to your normal daily activities  Rarely, bedrest is needed if other health problems develop with ascites  Where can I find more information? · [employer] [facility]  [address] [place] , [address]  Phone: [phone]  Web Address: [employer]  When should I contact my healthcare provider? Contact your healthcare provider if:  · You are losing more or less weight than expected  · You are urinating less than usual      · You feel dizzy or lightheaded  · You develop tiredness, dry mouth, nausea, or vomiting  · You have muscle cramps or twitches  · You have questions or concerns about your condition or care  When should I seek immediate care? Seek care immediately or call 911 if:  · You have a fever  · You feel pain in your abdomen  · You have trouble breathing       · You feel confused, faint, or lose consciousness  · You vomit blood or see blood in your bowel movement  CARE AGREEMENT:   You have the right to help plan your care  Learn about your health condition and how it may be treated  Discuss treatment options with your caregivers to decide what care you want to receive  You always have the right to refuse treatment  The above information is an  only  It is not intended as medical advice for individual conditions or treatments  Talk to your doctor, nurse or pharmacist before following any medical regimen to see if it is safe and effective for you  © 2017 [employer]  Information is for End User's use only and may not be sold, redistributed or otherwise used for commercial purposes  All illustrations and images included in CareNotes® are the copyrighted property of A [employer] M [employer] Inc  or [employer]

## 2018-08-22 ENCOUNTER — OFFICE VISIT (OUTPATIENT)
Dept: GYNECOLOGIC ONCOLOGY | Facility: CLINIC | Age: 78
End: 2018-08-22
Payer: MEDICARE

## 2018-08-22 VITALS
RESPIRATION RATE: 16 BRPM | HEART RATE: 115 BPM | BODY MASS INDEX: 33.31 KG/M2 | DIASTOLIC BLOOD PRESSURE: 84 MMHG | SYSTOLIC BLOOD PRESSURE: 182 MMHG | HEIGHT: 62 IN | WEIGHT: 181 LBS

## 2018-08-22 DIAGNOSIS — Z12.31 VISIT FOR SCREENING MAMMOGRAM: ICD-10-CM

## 2018-08-22 DIAGNOSIS — C57.9 GYNECOLOGIC CANCER (HCC): ICD-10-CM

## 2018-08-22 DIAGNOSIS — R91.1 PULMONARY NODULE: ICD-10-CM

## 2018-08-22 DIAGNOSIS — I82.4Z2 DVT, LOWER EXTREMITY, DISTAL, ACUTE, LEFT (HCC): Primary | ICD-10-CM

## 2018-08-22 PROCEDURE — 99205 OFFICE O/P NEW HI 60 MIN: CPT | Performed by: OBSTETRICS & GYNECOLOGY

## 2018-08-22 NOTE — ASSESSMENT & PLAN NOTE
Will transition from Lovenox to Xarelto  If financially acceptable  Prescription given for 20 mg p o  Daily

## 2018-08-22 NOTE — ASSESSMENT & PLAN NOTE
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, extensive ascites, 4 cm left adnexal mass, thickened endometrium  She has a left lower extremity DVT  Her performance status is 0   1  I discussed combination therapy -chemotherapy with surgery  I discussed the role of neoadjuvant chemotherapy  2  I discussed the risks and benefits of carboplatin at AUC 6  To be given every 21 days with Taxol at 80 milligrams/meter squared to be given weekly for 3 cycles followed by repeat imaging and  Determination if she is a candidate for cytoreductive surgery   will be followed prior to each cycle  3   CT of chest to evaluate for additional metastatic disease  4  Genetic testing -she understands the rationale and agrees to proceed  5   MediPort placement for chemotherapy administration  Thank you for the courtesy of this consultation  All questions were answered by the end of the visit

## 2018-08-22 NOTE — LETTER
August 22, 2018     Karlene Loredo, [address] [address]    Patient: Gregroia Preciado   YOB: 1940   Date of Visit: 8/22/2018       Dear Dr Dominguez Salvage: Thank you for referring Gregoria Preciado to me for evaluation  Below are my notes for this consultation  If you have questions, please do not hesitate to call me  I look forward to following your patient along with you  Sincerely,        Elmer Ribeiro MD        CC: MD Elmer Fritz MD  8/22/2018  1:58 PM  Sign at close encounter  Assessment/Plan:    Problem List Items Addressed This Visit        Cardiovascular and Mediastinum    DVT, lower extremity, distal, acute, left ([facility] [address] ) - Primary      Will transition from Lovenox to Xarelto  If financially acceptable  Prescription given for 20 mg p o  Daily  Relevant Medications    rivaroxaban (XARELTO) 20 mg tablet       Genitourinary    Gynecologic cancer [facility])     43-year-old  With biopsy-proven adenocarcinoma, 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, extensive ascites, 4 cm left adnexal mass, thickened endometrium  She has a left lower extremity DVT  Her performance status is 0   1  I discussed combination therapy -chemotherapy with surgery  I discussed the role of neoadjuvant chemotherapy  2  I discussed the risks and benefits of carboplatin at AUC 6  To be given every 21 days with Taxol at 80 milligrams/meter squared to be given weekly for 3 cycles followed by repeat imaging and  Determination if she is a candidate for cytoreductive surgery   will be followed prior to each cycle  3   CT of chest to evaluate for additional metastatic disease  4  Genetic testing -she understands the rationale and agrees to proceed  5   MediPort placement for chemotherapy administration  Thank you for the courtesy of this consultation  All questions were answered by the end of the visit           Relevant Orders    CT chest wo contrast    IR port Placement Other    Pulmonary nodule       Evaluate with chest CT without contrast         Visit for screening mammogram    Relevant Orders    Mammo screening bilateral w cad            CHIEF COMPLAINT:  Treatment discussion        Patient ID: Kendy Doll is a 77 y o  female   67-year-old who initially presented with abdominal distention and discomfort and was admitted to the hospital on 8/15/2018  She had a CT scan of the abdomen and pelvis that I personally reviewed that revealed a 1 2 cm right lower lobe pulmonary nodule, extensive abdominal ascites with likely peritoneal carcinomatosis, 4 cm left adnexal mass  No evidence of lymphadenopathy  Pelvic ultrasound performed the same day revealed a 9 mm endometrial thickness and the 4 cm left ovarian mass  Due to left lower extremity edema, she had a Doppler ultrasound performed and a left lower extremity DVT was identified  She currently takes therapeutic Lovenox at 1 milligram/kilogram b i d  Veronica Sinks Therapeutic and diagnostic paracentesis performed on 8/16/2018 revealed adenocarcinoma 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   is 535 9 units/liter  Laboratory studies performed on 8/17/2018 reveal hemoglobin of 10 8 grams/deciliter, thrombocytosis of 4 and 24,000, normal electrolytes and serum creatinine  She does not have postmenopausal bleeding  She is active  She feels mild fatigue  Her bowels function normally  She is referred as a consultation by Dr Florencio Doan to discuss treatment options for her likely gynecologic malignancy  The following portions of the patient's history were reviewed and updated as appropriate: allergies, current medications, past family history, past medical history, past social history, past surgical history and problem list     Review of Systems   Constitutional: Positive for activity change and fatigue  Cardiovascular: Positive for leg swelling  Gastrointestinal: Positive for abdominal distention   Negative for abdominal pain, nausea and vomiting  Genitourinary: Negative for pelvic pain and vaginal bleeding  All other systems reviewed and are negative  Current Outpatient Prescriptions   Medication Sig Dispense Refill    rivaroxaban (XARELTO) 20 mg tablet Take 1 tablet (20 mg total) by mouth daily with breakfast 30 tablet 3     No current facility-administered medications for this visit  Objective:    Blood pressure (!) 182/84, pulse (!) 115, resp  rate 16, height 5' 2" (1 575 m), weight 82 1 kg (181 lb)  Body mass index is 33 11 kg/m²  Body surface area is 1 83 meters squared  Physical Exam   Constitutional: She is oriented to person, place, and time  She appears well-developed and well-nourished  No distress  HENT:   Head: Normocephalic and atraumatic  Eyes: No scleral icterus  Neck: Normal range of motion  Neck supple  No tracheal deviation present  No thyromegaly present  Pulmonary/Chest: Effort normal    Abdominal: She exhibits distension and mass  There is no tenderness  There is no rebound and no guarding  Thickening present in the right lower quadrant  Distension  No rebound or guarding  Genitourinary:   Genitourinary Comments:   External female genitalia within normal limits  Speculum exam revealed a grossly normal vagina  The anterior lip of the cervix was grossly normal   No other masses or lesions  Bimanual examination revealed a fixed pelvis  Musculoskeletal: She exhibits edema  She exhibits no tenderness or deformity  Left 2+ right none   Lymphadenopathy:     She has no cervical adenopathy  Neurological: She is alert and oriented to person, place, and time  No cranial nerve deficit  Skin: Skin is warm and dry  She is not diaphoretic  Psychiatric: She has a normal mood and affect   Her behavior is normal  Judgment and thought content normal        Lab Results   Component Value Date     535 9 (H) 08/16/2018     Lab Results   Component Value Date     08/17/2018    K 3 6 08/17/2018     08/17/2018    CO2 30 08/17/2018    ANIONGAP 7 08/17/2018    BUN 11 08/17/2018    CREATININE 0 62 08/17/2018    GLUCOSE 117 08/17/2018    CALCIUM 7 9 (L) 08/17/2018    AST 14 08/16/2018    ALT 9 (L) 08/16/2018    ALKPHOS 109 08/16/2018    PROT 6 2 (L) 08/16/2018    BILITOT 0 30 08/16/2018    EGFR 86 08/17/2018     Lab Results   Component Value Date    WBC 9 27 08/17/2018    HGB 10 8 (L) 08/17/2018    HCT 35 6 08/17/2018    MCV 84 08/17/2018     (H) 08/17/2018     Lab Results   Component Value Date    NEUTROABS 6 67 08/17/2018

## 2018-08-22 NOTE — PROGRESS NOTES
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, extensive ascites, 4 cm left adnexal mass, thickened endometrium  She has a left lower extremity DVT  Her performance status is 0   1  I discussed combination therapy -chemotherapy with surgery  I discussed the role of neoadjuvant chemotherapy  2  I discussed the risks and benefits of carboplatin at AUC 6  To be given every 21 days with Taxol at 80 milligrams/meter squared to be given weekly for 3 cycles followed by repeat imaging and  Determination if she is a candidate for cytoreductive surgery   will be followed prior to each cycle  3   CT of chest to evaluate for additional metastatic disease  4  Genetic testing -she understands the rationale and agrees to proceed  5   MediPort placement for chemotherapy administration  Thank you for the courtesy of this consultation  All questions were answered by the end of the visit  Relevant Orders    CT chest wo contrast    IR port Placement       Other    Pulmonary nodule       Evaluate with chest CT without contrast         Visit for screening mammogram    Relevant Orders    Mammo screening bilateral w cad            CHIEF COMPLAINT:  Treatment discussion        Patient ID: Ramo Waters is a 77 y o  female   69-year-old who initially presented with abdominal distention and discomfort and was admitted to the hospital on 8/15/2018    She had a CT scan of the abdomen and pelvis that I personally reviewed that revealed a 1 2 cm right lower lobe pulmonary nodule, extensive abdominal ascites with likely peritoneal carcinomatosis, 4 cm left adnexal mass  No evidence of lymphadenopathy  Pelvic ultrasound performed the same day revealed a 9 mm endometrial thickness and the 4 cm left ovarian mass  Due to left lower extremity edema, she had a Doppler ultrasound performed and a left lower extremity DVT was identified  She currently takes therapeutic Lovenox at 1 milligram/kilogram b i d  Peri Baljitkerrie Therapeutic and diagnostic paracentesis performed on 8/16/2018 revealed adenocarcinoma 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   is 535 9 units/liter  Laboratory studies performed on 8/17/2018 reveal hemoglobin of 10 8 grams/deciliter, thrombocytosis of 4 and 24,000, normal electrolytes and serum creatinine  She does not have postmenopausal bleeding  She is active  She feels mild fatigue  Her bowels function normally  She is referred as a consultation by Dr Lilly Mims to discuss treatment options for her likely gynecologic malignancy  The following portions of the patient's history were reviewed and updated as appropriate: allergies, current medications, past family history, past medical history, past social history, past surgical history and problem list     Review of Systems   Constitutional: Positive for activity change and fatigue  Cardiovascular: Positive for leg swelling  Gastrointestinal: Positive for abdominal distention  Negative for abdominal pain, nausea and vomiting  Genitourinary: Negative for pelvic pain and vaginal bleeding  All other systems reviewed and are negative  Current Outpatient Prescriptions   Medication Sig Dispense Refill    rivaroxaban (XARELTO) 20 mg tablet Take 1 tablet (20 mg total) by mouth daily with breakfast 30 tablet 3     No current facility-administered medications for this visit  Objective:    Blood pressure (!) 182/84, pulse (!) 115, resp  rate 16, height 5' 2" (1 575 m), weight 82 1 kg (181 lb)  Body mass index is 33 11 kg/m²    Body surface area is 1 83 meters squared  Physical Exam   Constitutional: She is oriented to person, place, and time  She appears well-developed and well-nourished  No distress  HENT:   Head: Normocephalic and atraumatic  Eyes: No scleral icterus  Neck: Normal range of motion  Neck supple  No tracheal deviation present  No thyromegaly present  Pulmonary/Chest: Effort normal    Abdominal: She exhibits distension and mass  There is no tenderness  There is no rebound and no guarding  Thickening present in the right lower quadrant  Distension  No rebound or guarding  Genitourinary:   Genitourinary Comments:   External female genitalia within normal limits  Speculum exam revealed a grossly normal vagina  The anterior lip of the cervix was grossly normal   No other masses or lesions  Bimanual examination revealed a fixed pelvis  Musculoskeletal: She exhibits edema  She exhibits no tenderness or deformity  Left 2+ right none   Lymphadenopathy:     She has no cervical adenopathy  Neurological: She is alert and oriented to person, place, and time  No cranial nerve deficit  Skin: Skin is warm and dry  She is not diaphoretic  Psychiatric: She has a normal mood and affect   Her behavior is normal  Judgment and thought content normal        Lab Results   Component Value Date     535 9 (H) 08/16/2018     Lab Results   Component Value Date     08/17/2018    K 3 6 08/17/2018     08/17/2018    CO2 30 08/17/2018    ANIONGAP 7 08/17/2018    BUN 11 08/17/2018    CREATININE 0 62 08/17/2018    GLUCOSE 117 08/17/2018    CALCIUM 7 9 (L) 08/17/2018    AST 14 08/16/2018    ALT 9 (L) 08/16/2018    ALKPHOS 109 08/16/2018    PROT 6 2 (L) 08/16/2018    BILITOT 0 30 08/16/2018    EGFR 86 08/17/2018     Lab Results   Component Value Date    WBC 9 27 08/17/2018    HGB 10 8 (L) 08/17/2018    HCT 35 6 08/17/2018    MCV 84 08/17/2018     (H) 08/17/2018     Lab Results   Component Value Date    NEUTROABS 6 67 08/17/2018

## 2018-08-23 ENCOUNTER — TELEPHONE (OUTPATIENT)
Dept: RADIOLOGY | Facility: HOSPITAL | Age: 78
End: 2018-08-23

## 2018-08-23 DIAGNOSIS — T45.1X5A CHEMOTHERAPY-INDUCED NAUSEA: ICD-10-CM

## 2018-08-23 DIAGNOSIS — C57.9 GYNECOLOGIC MALIGNANCY (HCC): Primary | ICD-10-CM

## 2018-08-23 DIAGNOSIS — R18.0 MALIGNANT ASCITES: ICD-10-CM

## 2018-08-23 DIAGNOSIS — R11.0 CHEMOTHERAPY-INDUCED NAUSEA: ICD-10-CM

## 2018-08-23 RX ORDER — LORAZEPAM 1 MG/1
1 TABLET ORAL EVERY 6 HOURS PRN
Qty: 40 TABLET | Refills: 1 | Status: SHIPPED | OUTPATIENT
Start: 2018-08-23 | End: 2018-08-29

## 2018-08-23 RX ORDER — ONDANSETRON HYDROCHLORIDE 8 MG/1
8 TABLET, FILM COATED ORAL EVERY 8 HOURS PRN
Qty: 30 TABLET | Refills: 1 | Status: SHIPPED | OUTPATIENT
Start: 2018-08-23 | End: 2018-11-15

## 2018-08-24 ENCOUNTER — HOSPITAL ENCOUNTER (OUTPATIENT)
Dept: NON INVASIVE DIAGNOSTICS | Facility: HOSPITAL | Age: 78
Discharge: HOME | End: 2018-08-24
Payer: MEDICARE

## 2018-08-24 VITALS
HEART RATE: 98 BPM | OXYGEN SATURATION: 100 % | SYSTOLIC BLOOD PRESSURE: 158 MMHG | RESPIRATION RATE: 18 BRPM | DIASTOLIC BLOOD PRESSURE: 70 MMHG

## 2018-08-24 DIAGNOSIS — R18.0 MALIGNANT ASCITES: ICD-10-CM

## 2018-08-24 DIAGNOSIS — C57.9 GYNECOLOGIC MALIGNANCY (HCC): ICD-10-CM

## 2018-08-24 PROCEDURE — 49083 ABD PARACENTESIS W/IMAGING: CPT | Performed by: RADIOLOGY

## 2018-08-24 PROCEDURE — 49083 ABD PARACENTESIS W/IMAGING: CPT

## 2018-08-24 RX ORDER — LIDOCAINE HYDROCHLORIDE 10 MG/ML
INJECTION, SOLUTION INFILTRATION; PERINEURAL CODE/TRAUMA/SEDATION MEDICATION
Status: COMPLETED | OUTPATIENT
Start: 2018-08-24 | End: 2018-08-24

## 2018-08-24 RX ADMIN — LIDOCAINE HYDROCHLORIDE 10 ML: 10 INJECTION, SOLUTION INFILTRATION; PERINEURAL at 13:33

## 2018-08-24 NOTE — DISCHARGE INSTRUCTIONS
Abdominal Paracentesis     WHAT YOU NEED TO KNOW:   Abdominal paracentesis is a procedure to remove abnormal fluid buildup in your abdomen  Fluid builds up because of liver problems, such as swelling and scarring  Heart failure, kidney disease, a mass, or problems with your pancreas may also cause fluid buildup  DISCHARGE INSTRUCTIONS:     Follow up with your healthcare provider as directed: Write down your questions so you remember to ask them during your visits  Wound care: Remove dressing after 24 hours  Leave glue in place  Return to your normal activities    Contact Interventional Radiology at [phone] Stephanie PATIENTS: Contact Interventional Radiology at [phone]) Sadi Cade PATIENTS: Contact Interventional Radiology at [phone]) if:  · You have a fever and your wound is red and swollen  · You have yellow, green, or bad-smelling discharge coming from your wound  · You have pain or swelling in your abdomen  · You have an upset stomach or you vomit  · You have sudden, sharp pain in your abdomen  · You urinate very little or not at all  · You feel confused and more tired than usual    · Your arm or leg feels warm, tender, and painful  It may look swollen and red  · You suddenly feel lightheaded and have trouble breathing  Abdominal Paracentesis     WHAT YOU NEED TO KNOW:   Abdominal paracentesis is a procedure to remove abnormal fluid buildup in your abdomen  Fluid builds up because of liver problems, such as swelling and scarring  Heart failure, kidney disease, a mass, or problems with your pancreas may also cause fluid buildup  DISCHARGE INSTRUCTIONS:     Follow up with your healthcare provider as directed: Write down your questions so you remember to ask them during your visits  Wound care: Remove dressing after 24 hours  Leave glue in place      Return to your normal activities    Contact Interventional Radiology at [phone] Stephanie PATIENTS: Contact Interventional Radiology at [phone]) Lion Naranjo PATIENTS: Contact Interventional Radiology at [phone]) if:  · You have a fever and your wound is red and swollen  · You have yellow, green, or bad-smelling discharge coming from your wound  · You have pain or swelling in your abdomen  · You have an upset stomach or you vomit  · You have sudden, sharp pain in your abdomen  · You urinate very little or not at all  · You feel confused and more tired than usual    · Your arm or leg feels warm, tender, and painful  It may look swollen and red  · You suddenly feel lightheaded and have trouble breathing

## 2018-08-24 NOTE — BRIEF OP NOTE (RAD/CATH)
IR PARACENTESIS  Procedure Note    PATIENT NAME: Marisa Duarte  : 1940  MRN: [id number]     Pre-op Diagnosis:   1  Gynecologic malignancy ([facility] [address] )    2  Malignant ascites      Post-op Diagnosis:   1  Gynecologic malignancy ([facility] [address] )    2  Malignant ascites        Surgeon:   Rita Nieto MD  Assistants:     No qualified resident was available, Resident is only observing    Estimated Blood Loss:  1 mL or less  Findings:  Small amount of abdominal ascites within the right abdomen  Successful placement of needle within right abdominal fluid under ultrasound guidance  Excellent visualization of needle tip as a crossed into the peritoneal fluid  Successful removal of 3000 mL clear yellow fluid      Specimens:  None requested    Complications:  Nothing immediately apparent    Anesthesia: An Ye MD     Date: 2018  Time: 1:58 PM

## 2018-08-28 ENCOUNTER — HOSPITAL ENCOUNTER (OUTPATIENT)
Dept: RADIOLOGY | Facility: HOSPITAL | Age: 78
Discharge: HOME | End: 2018-08-28
Attending: OBSTETRICS & GYNECOLOGY
Payer: MEDICARE

## 2018-08-28 DIAGNOSIS — Z12.31 VISIT FOR SCREENING MAMMOGRAM: ICD-10-CM

## 2018-08-28 DIAGNOSIS — C57.9 GYNECOLOGIC CANCER (HCC): ICD-10-CM

## 2018-08-28 PROCEDURE — 77067 SCR MAMMO BI INCL CAD: CPT

## 2018-08-28 PROCEDURE — 71250 CT THORAX DX C-: CPT

## 2018-08-29 ENCOUNTER — HOSPITAL ENCOUNTER (OUTPATIENT)
Dept: NON INVASIVE DIAGNOSTICS | Facility: HOSPITAL | Age: 78
Discharge: HOME | End: 2018-08-29
Attending: OBSTETRICS & GYNECOLOGY
Payer: MEDICARE

## 2018-08-29 VITALS
HEART RATE: 94 BPM | DIASTOLIC BLOOD PRESSURE: 70 MMHG | HEIGHT: 62 IN | RESPIRATION RATE: 18 BRPM | WEIGHT: 180 LBS | BODY MASS INDEX: 33.13 KG/M2 | TEMPERATURE: 98.3 F | SYSTOLIC BLOOD PRESSURE: 167 MMHG | OXYGEN SATURATION: 97 %

## 2018-08-29 DIAGNOSIS — C57.9 GYNECOLOGIC CANCER (HCC): ICD-10-CM

## 2018-08-29 PROCEDURE — 36561 INSERT TUNNELED CV CATH: CPT

## 2018-08-29 PROCEDURE — 76937 US GUIDE VASCULAR ACCESS: CPT

## 2018-08-29 PROCEDURE — C1788 PORT, INDWELLING, IMP: HCPCS

## 2018-08-29 PROCEDURE — 76937 US GUIDE VASCULAR ACCESS: CPT | Performed by: RADIOLOGY

## 2018-08-29 PROCEDURE — 77001 FLUOROGUIDE FOR VEIN DEVICE: CPT | Performed by: RADIOLOGY

## 2018-08-29 PROCEDURE — 36561 INSERT TUNNELED CV CATH: CPT | Performed by: RADIOLOGY

## 2018-08-29 PROCEDURE — 99152 MOD SED SAME PHYS/QHP 5/>YRS: CPT | Performed by: RADIOLOGY

## 2018-08-29 PROCEDURE — 99152 MOD SED SAME PHYS/QHP 5/>YRS: CPT

## 2018-08-29 PROCEDURE — 77001 FLUOROGUIDE FOR VEIN DEVICE: CPT

## 2018-08-29 RX ORDER — LIDOCAINE HYDROCHLORIDE AND EPINEPHRINE 10; 10 MG/ML; UG/ML
INJECTION, SOLUTION INFILTRATION; PERINEURAL CODE/TRAUMA/SEDATION MEDICATION
Status: COMPLETED | OUTPATIENT
Start: 2018-08-29 | End: 2018-08-29

## 2018-08-29 RX ORDER — FENTANYL CITRATE 50 UG/ML
INJECTION, SOLUTION INTRAMUSCULAR; INTRAVENOUS CODE/TRAUMA/SEDATION MEDICATION
Status: COMPLETED | OUTPATIENT
Start: 2018-08-29 | End: 2018-08-29

## 2018-08-29 RX ORDER — LORAZEPAM 1 MG/1
0.5 TABLET ORAL EVERY 6 HOURS PRN
COMMUNITY
End: 2018-11-15

## 2018-08-29 RX ORDER — HEPARIN SODIUM (PORCINE) LOCK FLUSH IV SOLN 100 UNIT/ML 100 UNIT/ML
SOLUTION INTRAVENOUS CODE/TRAUMA/SEDATION MEDICATION
Status: COMPLETED | OUTPATIENT
Start: 2018-08-29 | End: 2018-08-29

## 2018-08-29 RX ORDER — MIDAZOLAM HYDROCHLORIDE 1 MG/ML
INJECTION INTRAMUSCULAR; INTRAVENOUS CODE/TRAUMA/SEDATION MEDICATION
Status: COMPLETED | OUTPATIENT
Start: 2018-08-29 | End: 2018-08-29

## 2018-08-29 RX ADMIN — SODIUM CHLORIDE, PRESERVATIVE FREE 300 UNITS: 5 INJECTION INTRAVENOUS at 13:43

## 2018-08-29 RX ADMIN — LIDOCAINE HYDROCHLORIDE AND EPINEPHRINE 20 ML: 10; 10 INJECTION, SOLUTION INFILTRATION; PERINEURAL at 13:31

## 2018-08-29 RX ADMIN — MIDAZOLAM HYDROCHLORIDE 2 MG: 1 INJECTION, SOLUTION INTRAMUSCULAR; INTRAVENOUS at 13:29

## 2018-08-29 RX ADMIN — FENTANYL CITRATE 100 MCG: 50 INJECTION, SOLUTION INTRAMUSCULAR; INTRAVENOUS at 13:30

## 2018-08-29 RX ADMIN — SODIUM CHLORIDE 500 ML: 0.9 INJECTION, SOLUTION INTRAVENOUS at 13:10

## 2018-08-29 NOTE — PERIOPERATIVE NURSING NOTE
Received from procedure fully awake and alert   Dressing to right upper chest dry and intact  Luncheon given  Friend at bedside

## 2018-08-29 NOTE — SEDATION DOCUMENTATION
Procedure completed without incidence  Patient tolerated well  Will transfer back to room for recovery

## 2018-08-30 ENCOUNTER — APPOINTMENT (OUTPATIENT)
Dept: LAB | Facility: HOSPITAL | Age: 78
End: 2018-08-30
Payer: MEDICARE

## 2018-08-30 ENCOUNTER — TELEPHONE (OUTPATIENT)
Dept: GYNECOLOGIC ONCOLOGY | Facility: CLINIC | Age: 78
End: 2018-08-30

## 2018-08-30 ENCOUNTER — TRANSCRIBE ORDERS (OUTPATIENT)
Dept: ADMINISTRATIVE | Facility: HOSPITAL | Age: 78
End: 2018-08-30

## 2018-08-30 DIAGNOSIS — C57.9 MALIGNANT NEOPLASM OF FEMALE GENITAL ORGAN (HCC): Primary | ICD-10-CM

## 2018-08-30 DIAGNOSIS — R91.1 PULMONARY NODULE: Primary | ICD-10-CM

## 2018-08-30 DIAGNOSIS — C57.9 MALIGNANT NEOPLASM OF FEMALE GENITAL ORGAN (HCC): ICD-10-CM

## 2018-08-30 LAB
ALBUMIN SERPL BCP-MCNC: 2.1 G/DL (ref 3.5–5)
ALP SERPL-CCNC: 113 U/L (ref 46–116)
ALT SERPL W P-5'-P-CCNC: 14 U/L (ref 12–78)
ANION GAP SERPL CALCULATED.3IONS-SCNC: 10 MMOL/L (ref 4–13)
AST SERPL W P-5'-P-CCNC: 23 U/L (ref 5–45)
BASOPHILS # BLD AUTO: 0.03 THOUSANDS/ΜL (ref 0–0.1)
BASOPHILS NFR BLD AUTO: 0 % (ref 0–1)
BILIRUB SERPL-MCNC: 0.3 MG/DL (ref 0.2–1)
BUN SERPL-MCNC: 16 MG/DL (ref 5–25)
CALCIUM SERPL-MCNC: 8.4 MG/DL (ref 8.3–10.1)
CHLORIDE SERPL-SCNC: 106 MMOL/L (ref 100–108)
CO2 SERPL-SCNC: 26 MMOL/L (ref 21–32)
CREAT SERPL-MCNC: 0.71 MG/DL (ref 0.6–1.3)
EOSINOPHIL # BLD AUTO: 0.09 THOUSAND/ΜL (ref 0–0.61)
EOSINOPHIL NFR BLD AUTO: 1 % (ref 0–6)
ERYTHROCYTE [DISTWIDTH] IN BLOOD BY AUTOMATED COUNT: 14.3 % (ref 11.6–15.1)
GFR SERPL CREATININE-BSD FRML MDRD: 81 ML/MIN/1.73SQ M
GLUCOSE SERPL-MCNC: 98 MG/DL (ref 65–140)
HCT VFR BLD AUTO: 37.2 % (ref 34.8–46.1)
HGB BLD-MCNC: 11.2 G/DL (ref 11.5–15.4)
IMM GRANULOCYTES # BLD AUTO: 0.03 THOUSAND/UL (ref 0–0.2)
IMM GRANULOCYTES NFR BLD AUTO: 0 % (ref 0–2)
LYMPHOCYTES # BLD AUTO: 1.59 THOUSANDS/ΜL (ref 0.6–4.47)
LYMPHOCYTES NFR BLD AUTO: 19 % (ref 14–44)
MAGNESIUM SERPL-MCNC: 2 MG/DL (ref 1.6–2.6)
MCH RBC QN AUTO: 25.2 PG (ref 26.8–34.3)
MCHC RBC AUTO-ENTMCNC: 30.1 G/DL (ref 31.4–37.4)
MCV RBC AUTO: 84 FL (ref 82–98)
MONOCYTES # BLD AUTO: 0.95 THOUSAND/ΜL (ref 0.17–1.22)
MONOCYTES NFR BLD AUTO: 11 % (ref 4–12)
NEUTROPHILS # BLD AUTO: 5.78 THOUSANDS/ΜL (ref 1.85–7.62)
NEUTS SEG NFR BLD AUTO: 69 % (ref 43–75)
NRBC BLD AUTO-RTO: 0 /100 WBCS
PLATELET # BLD AUTO: 473 THOUSANDS/UL (ref 149–390)
PMV BLD AUTO: 9.8 FL (ref 8.9–12.7)
POTASSIUM SERPL-SCNC: 3.9 MMOL/L (ref 3.5–5.3)
PROT SERPL-MCNC: 6.2 G/DL (ref 6.4–8.2)
RBC # BLD AUTO: 4.44 MILLION/UL (ref 3.81–5.12)
SODIUM SERPL-SCNC: 142 MMOL/L (ref 136–145)
WBC # BLD AUTO: 8.47 THOUSAND/UL (ref 4.31–10.16)

## 2018-08-30 PROCEDURE — 80053 COMPREHEN METABOLIC PANEL: CPT

## 2018-08-30 PROCEDURE — 85025 COMPLETE CBC W/AUTO DIFF WBC: CPT

## 2018-08-30 PROCEDURE — 36415 COLL VENOUS BLD VENIPUNCTURE: CPT

## 2018-08-30 PROCEDURE — 83735 ASSAY OF MAGNESIUM: CPT

## 2018-08-30 RX ORDER — SODIUM CHLORIDE 9 MG/ML
20 INJECTION, SOLUTION INTRAVENOUS ONCE
Status: COMPLETED | OUTPATIENT
Start: 2018-08-31 | End: 2018-08-31

## 2018-08-30 RX ORDER — PALONOSETRON 0.05 MG/ML
0.25 INJECTION, SOLUTION INTRAVENOUS ONCE
Status: COMPLETED | OUTPATIENT
Start: 2018-08-31 | End: 2018-08-31

## 2018-08-31 ENCOUNTER — HOSPITAL ENCOUNTER (OUTPATIENT)
Dept: INFUSION CENTER | Facility: HOSPITAL | Age: 78
Discharge: HOME | End: 2018-08-31
Payer: MEDICARE

## 2018-08-31 VITALS
OXYGEN SATURATION: 96 % | DIASTOLIC BLOOD PRESSURE: 79 MMHG | BODY MASS INDEX: 33.43 KG/M2 | RESPIRATION RATE: 18 BRPM | WEIGHT: 181.66 LBS | TEMPERATURE: 98.5 F | SYSTOLIC BLOOD PRESSURE: 142 MMHG | HEIGHT: 62 IN | HEART RATE: 118 BPM

## 2018-08-31 PROCEDURE — 96367 TX/PROPH/DG ADDL SEQ IV INF: CPT

## 2018-08-31 PROCEDURE — 96375 TX/PRO/DX INJ NEW DRUG ADDON: CPT

## 2018-08-31 PROCEDURE — 96417 CHEMO IV INFUS EACH ADDL SEQ: CPT

## 2018-08-31 PROCEDURE — 96413 CHEMO IV INFUSION 1 HR: CPT

## 2018-08-31 RX ADMIN — CARBOPLATIN 531 MG: 10 INJECTION, SOLUTION INTRAVENOUS at 12:08

## 2018-08-31 RX ADMIN — Medication 300 UNITS: at 13:16

## 2018-08-31 RX ADMIN — FAMOTIDINE 20 MG: 10 INJECTION, SOLUTION INTRAVENOUS at 09:40

## 2018-08-31 RX ADMIN — SODIUM CHLORIDE 20 ML/HR: 0.9 INJECTION, SOLUTION INTRAVENOUS at 09:13

## 2018-08-31 RX ADMIN — SODIUM CHLORIDE 150 MG: 0.9 INJECTION, SOLUTION INTRAVENOUS at 10:24

## 2018-08-31 RX ADMIN — PALONOSETRON 0.25 MG: 0.05 INJECTION, SOLUTION INTRAVENOUS at 10:56

## 2018-08-31 RX ADMIN — PACLITAXEL 146 MG: 6 INJECTION, SOLUTION INTRAVENOUS at 11:01

## 2018-08-31 RX ADMIN — DEXAMETHASONE SODIUM PHOSPHATE 20 MG: 10 INJECTION, SOLUTION INTRAMUSCULAR; INTRAVENOUS at 09:13

## 2018-08-31 RX ADMIN — DIPHENHYDRAMINE HYDROCHLORIDE 25 MG: 50 INJECTION, SOLUTION INTRAMUSCULAR; INTRAVENOUS at 10:01

## 2018-09-05 ENCOUNTER — HOSPITAL ENCOUNTER (OUTPATIENT)
Dept: RADIOLOGY | Facility: HOSPITAL | Age: 78
Discharge: HOME | End: 2018-09-05
Attending: OBSTETRICS & GYNECOLOGY
Payer: MEDICARE

## 2018-09-05 ENCOUNTER — HOSPITAL ENCOUNTER (OUTPATIENT)
Dept: INFUSION CENTER | Facility: HOSPITAL | Age: 78
Discharge: HOME | End: 2018-09-05
Payer: MEDICARE

## 2018-09-05 DIAGNOSIS — R92.8 ABNORMAL MAMMOGRAM: ICD-10-CM

## 2018-09-05 LAB
ALBUMIN SERPL BCP-MCNC: 2.1 G/DL (ref 3.5–5)
ALP SERPL-CCNC: 108 U/L (ref 46–116)
ALT SERPL W P-5'-P-CCNC: 14 U/L (ref 12–78)
ANION GAP SERPL CALCULATED.3IONS-SCNC: 5 MMOL/L (ref 4–13)
AST SERPL W P-5'-P-CCNC: 23 U/L (ref 5–45)
BASOPHILS # BLD AUTO: 0.02 THOUSANDS/ΜL (ref 0–0.1)
BASOPHILS NFR BLD AUTO: 0 % (ref 0–1)
BILIRUB SERPL-MCNC: 0.3 MG/DL (ref 0.2–1)
BUN SERPL-MCNC: 15 MG/DL (ref 5–25)
CALCIUM SERPL-MCNC: 8.1 MG/DL (ref 8.3–10.1)
CHLORIDE SERPL-SCNC: 102 MMOL/L (ref 100–108)
CO2 SERPL-SCNC: 31 MMOL/L (ref 21–32)
CREAT SERPL-MCNC: 0.65 MG/DL (ref 0.6–1.3)
EOSINOPHIL # BLD AUTO: 0.09 THOUSAND/ΜL (ref 0–0.61)
EOSINOPHIL NFR BLD AUTO: 2 % (ref 0–6)
ERYTHROCYTE [DISTWIDTH] IN BLOOD BY AUTOMATED COUNT: 14.5 % (ref 11.6–15.1)
GFR SERPL CREATININE-BSD FRML MDRD: 85 ML/MIN/1.73SQ M
GLUCOSE SERPL-MCNC: 92 MG/DL (ref 65–140)
HCT VFR BLD AUTO: 33.9 % (ref 34.8–46.1)
HGB BLD-MCNC: 10 G/DL (ref 11.5–15.4)
IMM GRANULOCYTES # BLD AUTO: 0.01 THOUSAND/UL (ref 0–0.2)
IMM GRANULOCYTES NFR BLD AUTO: 0 % (ref 0–2)
LYMPHOCYTES # BLD AUTO: 1.23 THOUSANDS/ΜL (ref 0.6–4.47)
LYMPHOCYTES NFR BLD AUTO: 23 % (ref 14–44)
MAGNESIUM SERPL-MCNC: 1.9 MG/DL (ref 1.6–2.6)
MCH RBC QN AUTO: 24.8 PG (ref 26.8–34.3)
MCHC RBC AUTO-ENTMCNC: 29.5 G/DL (ref 31.4–37.4)
MCV RBC AUTO: 84 FL (ref 82–98)
MONOCYTES # BLD AUTO: 0.22 THOUSAND/ΜL (ref 0.17–1.22)
MONOCYTES NFR BLD AUTO: 4 % (ref 4–12)
NEUTROPHILS # BLD AUTO: 3.88 THOUSANDS/ΜL (ref 1.85–7.62)
NEUTS SEG NFR BLD AUTO: 71 % (ref 43–75)
NRBC BLD AUTO-RTO: 0 /100 WBCS
PLATELET # BLD AUTO: 366 THOUSANDS/UL (ref 149–390)
PMV BLD AUTO: 9.6 FL (ref 8.9–12.7)
POTASSIUM SERPL-SCNC: 4 MMOL/L (ref 3.5–5.3)
PROT SERPL-MCNC: 6.2 G/DL (ref 6.4–8.2)
RBC # BLD AUTO: 4.04 MILLION/UL (ref 3.81–5.12)
SODIUM SERPL-SCNC: 138 MMOL/L (ref 136–145)
WBC # BLD AUTO: 5.45 THOUSAND/UL (ref 4.31–10.16)

## 2018-09-05 PROCEDURE — 83735 ASSAY OF MAGNESIUM: CPT | Performed by: OBSTETRICS & GYNECOLOGY

## 2018-09-05 PROCEDURE — 96523 IRRIG DRUG DELIVERY DEVICE: CPT

## 2018-09-05 PROCEDURE — 85025 COMPLETE CBC W/AUTO DIFF WBC: CPT | Performed by: OBSTETRICS & GYNECOLOGY

## 2018-09-05 PROCEDURE — 76642 ULTRASOUND BREAST LIMITED: CPT

## 2018-09-05 PROCEDURE — 77065 DX MAMMO INCL CAD UNI: CPT

## 2018-09-05 PROCEDURE — 80053 COMPREHEN METABOLIC PANEL: CPT | Performed by: OBSTETRICS & GYNECOLOGY

## 2018-09-05 PROCEDURE — G0279 TOMOSYNTHESIS, MAMMO: HCPCS

## 2018-09-05 RX ADMIN — Medication 300 UNITS: at 11:15

## 2018-09-06 ENCOUNTER — TELEPHONE (OUTPATIENT)
Dept: GYNECOLOGIC ONCOLOGY | Facility: CLINIC | Age: 78
End: 2018-09-06

## 2018-09-06 DIAGNOSIS — R91.8 LUNG MASS: ICD-10-CM

## 2018-09-06 DIAGNOSIS — C57.9 GYNECOLOGIC MALIGNANCY (HCC): Primary | ICD-10-CM

## 2018-09-06 RX ORDER — SODIUM CHLORIDE 9 MG/ML
20 INJECTION, SOLUTION INTRAVENOUS ONCE
Status: COMPLETED | OUTPATIENT
Start: 2018-09-07 | End: 2018-09-07

## 2018-09-06 NOTE — TELEPHONE ENCOUNTER
Dr Silvia Lo discussed IR biopsy of lung lesion with Dr Holger Turner  Plan to proceed with PET imaging first  If FDG avid, will proceed with biopsy  Discussed to new plan of care with patient

## 2018-09-07 ENCOUNTER — HOSPITAL ENCOUNTER (OUTPATIENT)
Dept: INFUSION CENTER | Facility: HOSPITAL | Age: 78
Discharge: HOME | End: 2018-09-07
Payer: MEDICARE

## 2018-09-07 VITALS
WEIGHT: 181.44 LBS | OXYGEN SATURATION: 95 % | BODY MASS INDEX: 33.39 KG/M2 | TEMPERATURE: 98.6 F | HEIGHT: 62 IN | DIASTOLIC BLOOD PRESSURE: 68 MMHG | RESPIRATION RATE: 18 BRPM | HEART RATE: 104 BPM | SYSTOLIC BLOOD PRESSURE: 132 MMHG

## 2018-09-07 PROCEDURE — 96367 TX/PROPH/DG ADDL SEQ IV INF: CPT

## 2018-09-07 PROCEDURE — 96413 CHEMO IV INFUSION 1 HR: CPT

## 2018-09-07 RX ADMIN — SODIUM CHLORIDE 20 ML/HR: 9 INJECTION, SOLUTION INTRAVENOUS at 09:09

## 2018-09-07 RX ADMIN — DEXAMETHASONE SODIUM PHOSPHATE 20 MG: 10 INJECTION, SOLUTION INTRAMUSCULAR; INTRAVENOUS at 09:09

## 2018-09-07 RX ADMIN — FAMOTIDINE 20 MG: 10 INJECTION, SOLUTION INTRAVENOUS at 09:32

## 2018-09-07 RX ADMIN — Medication 300 UNITS: at 11:35

## 2018-09-07 RX ADMIN — PACLITAXEL 146 MG: 6 INJECTION, SOLUTION INTRAVENOUS at 10:24

## 2018-09-07 RX ADMIN — DIPHENHYDRAMINE HYDROCHLORIDE 25 MG: 50 INJECTION, SOLUTION INTRAMUSCULAR; INTRAVENOUS at 09:55

## 2018-09-12 ENCOUNTER — HOSPITAL ENCOUNTER (OUTPATIENT)
Dept: INFUSION CENTER | Facility: HOSPITAL | Age: 78
Discharge: HOME | End: 2018-09-12
Payer: MEDICARE

## 2018-09-12 LAB
ALBUMIN SERPL BCP-MCNC: 2.8 G/DL (ref 3.5–5)
ALP SERPL-CCNC: 127 U/L (ref 46–116)
ALT SERPL W P-5'-P-CCNC: 18 U/L (ref 12–78)
ANION GAP SERPL CALCULATED.3IONS-SCNC: 7 MMOL/L (ref 4–13)
AST SERPL W P-5'-P-CCNC: 22 U/L (ref 5–45)
BASOPHILS # BLD AUTO: 0.01 THOUSANDS/ΜL (ref 0–0.1)
BASOPHILS NFR BLD AUTO: 0 % (ref 0–1)
BILIRUB SERPL-MCNC: 0.2 MG/DL (ref 0.2–1)
BUN SERPL-MCNC: 11 MG/DL (ref 5–25)
CALCIUM SERPL-MCNC: 8.6 MG/DL (ref 8.3–10.1)
CHLORIDE SERPL-SCNC: 103 MMOL/L (ref 100–108)
CO2 SERPL-SCNC: 29 MMOL/L (ref 21–32)
CREAT SERPL-MCNC: 0.76 MG/DL (ref 0.6–1.3)
EOSINOPHIL # BLD AUTO: 0.03 THOUSAND/ΜL (ref 0–0.61)
EOSINOPHIL NFR BLD AUTO: 1 % (ref 0–6)
ERYTHROCYTE [DISTWIDTH] IN BLOOD BY AUTOMATED COUNT: 15.1 % (ref 11.6–15.1)
GFR SERPL CREATININE-BSD FRML MDRD: 75 ML/MIN/1.73SQ M
GLUCOSE SERPL-MCNC: 97 MG/DL (ref 65–140)
HCT VFR BLD AUTO: 32.7 % (ref 34.8–46.1)
HGB BLD-MCNC: 9.8 G/DL (ref 11.5–15.4)
IMM GRANULOCYTES # BLD AUTO: 0.01 THOUSAND/UL (ref 0–0.2)
IMM GRANULOCYTES NFR BLD AUTO: 0 % (ref 0–2)
LYMPHOCYTES # BLD AUTO: 1.32 THOUSANDS/ΜL (ref 0.6–4.47)
LYMPHOCYTES NFR BLD AUTO: 39 % (ref 14–44)
MAGNESIUM SERPL-MCNC: 1.6 MG/DL (ref 1.6–2.6)
MCH RBC QN AUTO: 25.1 PG (ref 26.8–34.3)
MCHC RBC AUTO-ENTMCNC: 30 G/DL (ref 31.4–37.4)
MCV RBC AUTO: 84 FL (ref 82–98)
MONOCYTES # BLD AUTO: 0.3 THOUSAND/ΜL (ref 0.17–1.22)
MONOCYTES NFR BLD AUTO: 9 % (ref 4–12)
NEUTROPHILS # BLD AUTO: 1.68 THOUSANDS/ΜL (ref 1.85–7.62)
NEUTS SEG NFR BLD AUTO: 51 % (ref 43–75)
NRBC BLD AUTO-RTO: 0 /100 WBCS
PLATELET # BLD AUTO: 316 THOUSANDS/UL (ref 149–390)
PMV BLD AUTO: 8.8 FL (ref 8.9–12.7)
POTASSIUM SERPL-SCNC: 3.9 MMOL/L (ref 3.5–5.3)
PROT SERPL-MCNC: 6.9 G/DL (ref 6.4–8.2)
RBC # BLD AUTO: 3.91 MILLION/UL (ref 3.81–5.12)
SODIUM SERPL-SCNC: 139 MMOL/L (ref 136–145)
WBC # BLD AUTO: 3.35 THOUSAND/UL (ref 4.31–10.16)

## 2018-09-12 PROCEDURE — 86304 IMMUNOASSAY TUMOR CA 125: CPT | Performed by: PHYSICIAN ASSISTANT

## 2018-09-12 PROCEDURE — 83735 ASSAY OF MAGNESIUM: CPT | Performed by: PHYSICIAN ASSISTANT

## 2018-09-12 PROCEDURE — 85025 COMPLETE CBC W/AUTO DIFF WBC: CPT | Performed by: PHYSICIAN ASSISTANT

## 2018-09-12 PROCEDURE — 80053 COMPREHEN METABOLIC PANEL: CPT | Performed by: PHYSICIAN ASSISTANT

## 2018-09-12 RX ADMIN — Medication 300 UNITS: at 10:59

## 2018-09-13 LAB — CANCER AG125 SERPL-ACNC: 1112.4 U/ML (ref 0–30)

## 2018-09-13 RX ORDER — SODIUM CHLORIDE 9 MG/ML
20 INJECTION, SOLUTION INTRAVENOUS ONCE
Status: COMPLETED | OUTPATIENT
Start: 2018-09-14 | End: 2018-09-14

## 2018-09-14 ENCOUNTER — HOSPITAL ENCOUNTER (OUTPATIENT)
Dept: INFUSION CENTER | Facility: HOSPITAL | Age: 78
Discharge: HOME | End: 2018-09-14
Payer: MEDICARE

## 2018-09-14 VITALS
HEIGHT: 62 IN | SYSTOLIC BLOOD PRESSURE: 140 MMHG | OXYGEN SATURATION: 95 % | RESPIRATION RATE: 18 BRPM | HEART RATE: 106 BPM | BODY MASS INDEX: 31.24 KG/M2 | WEIGHT: 169.75 LBS | TEMPERATURE: 98.6 F | DIASTOLIC BLOOD PRESSURE: 80 MMHG

## 2018-09-14 PROCEDURE — 96367 TX/PROPH/DG ADDL SEQ IV INF: CPT

## 2018-09-14 PROCEDURE — 96413 CHEMO IV INFUSION 1 HR: CPT

## 2018-09-14 RX ADMIN — SODIUM CHLORIDE 20 ML/HR: 0.9 INJECTION, SOLUTION INTRAVENOUS at 09:17

## 2018-09-14 RX ADMIN — Medication 300 UNITS: at 11:48

## 2018-09-14 RX ADMIN — DEXAMETHASONE SODIUM PHOSPHATE 20 MG: 10 INJECTION, SOLUTION INTRAMUSCULAR; INTRAVENOUS at 09:41

## 2018-09-14 RX ADMIN — FAMOTIDINE 20 MG: 10 INJECTION, SOLUTION INTRAVENOUS at 10:15

## 2018-09-14 RX ADMIN — PACLITAXEL 146 MG: 6 INJECTION, SOLUTION INTRAVENOUS at 10:38

## 2018-09-14 RX ADMIN — DIPHENHYDRAMINE HYDROCHLORIDE 25 MG: 50 INJECTION, SOLUTION INTRAMUSCULAR; INTRAVENOUS at 09:18

## 2018-09-18 ENCOUNTER — HOSPITAL ENCOUNTER (OUTPATIENT)
Dept: RADIOLOGY | Age: 78
Discharge: HOME | End: 2018-09-18
Payer: MEDICARE

## 2018-09-18 DIAGNOSIS — R91.8 LUNG MASS: ICD-10-CM

## 2018-09-18 DIAGNOSIS — C57.9 GYNECOLOGIC MALIGNANCY (HCC): ICD-10-CM

## 2018-09-18 LAB — GLUCOSE SERPL-MCNC: 92 MG/DL (ref 65–140)

## 2018-09-18 PROCEDURE — 82948 REAGENT STRIP/BLOOD GLUCOSE: CPT

## 2018-09-18 PROCEDURE — A9552 F18 FDG: HCPCS

## 2018-09-18 PROCEDURE — 78815 PET IMAGE W/CT SKULL-THIGH: CPT

## 2018-09-18 RX ADMIN — IOHEXOL 5 ML: 240 INJECTION, SOLUTION INTRATHECAL; INTRAVASCULAR; INTRAVENOUS; ORAL at 11:50

## 2018-09-19 ENCOUNTER — HOSPITAL ENCOUNTER (OUTPATIENT)
Dept: INFUSION CENTER | Facility: HOSPITAL | Age: 78
Discharge: HOME | End: 2018-09-19
Payer: MEDICARE

## 2018-09-19 LAB
ALBUMIN SERPL BCP-MCNC: 3.2 G/DL (ref 3.5–5)
ALP SERPL-CCNC: 121 U/L (ref 46–116)
ALT SERPL W P-5'-P-CCNC: 18 U/L (ref 12–78)
ANION GAP SERPL CALCULATED.3IONS-SCNC: 7 MMOL/L (ref 4–13)
AST SERPL W P-5'-P-CCNC: 16 U/L (ref 5–45)
BASOPHILS # BLD AUTO: 0.03 THOUSANDS/ΜL (ref 0–0.1)
BASOPHILS NFR BLD AUTO: 1 % (ref 0–1)
BILIRUB SERPL-MCNC: 0.3 MG/DL (ref 0.2–1)
BUN SERPL-MCNC: 21 MG/DL (ref 5–25)
CALCIUM SERPL-MCNC: 8.8 MG/DL (ref 8.3–10.1)
CHLORIDE SERPL-SCNC: 102 MMOL/L (ref 100–108)
CO2 SERPL-SCNC: 27 MMOL/L (ref 21–32)
CREAT SERPL-MCNC: 0.71 MG/DL (ref 0.6–1.3)
EOSINOPHIL # BLD AUTO: 0.01 THOUSAND/ΜL (ref 0–0.61)
EOSINOPHIL NFR BLD AUTO: 0 % (ref 0–6)
ERYTHROCYTE [DISTWIDTH] IN BLOOD BY AUTOMATED COUNT: 16.1 % (ref 11.6–15.1)
GFR SERPL CREATININE-BSD FRML MDRD: 81 ML/MIN/1.73SQ M
GLUCOSE SERPL-MCNC: 94 MG/DL (ref 65–140)
HCT VFR BLD AUTO: 34.1 % (ref 34.8–46.1)
HGB BLD-MCNC: 10.4 G/DL (ref 11.5–15.4)
IMM GRANULOCYTES # BLD AUTO: 0.02 THOUSAND/UL (ref 0–0.2)
IMM GRANULOCYTES NFR BLD AUTO: 1 % (ref 0–2)
LYMPHOCYTES # BLD AUTO: 1.6 THOUSANDS/ΜL (ref 0.6–4.47)
LYMPHOCYTES NFR BLD AUTO: 38 % (ref 14–44)
MAGNESIUM SERPL-MCNC: 1.7 MG/DL (ref 1.6–2.6)
MCH RBC QN AUTO: 25.4 PG (ref 26.8–34.3)
MCHC RBC AUTO-ENTMCNC: 30.5 G/DL (ref 31.4–37.4)
MCV RBC AUTO: 83 FL (ref 82–98)
MONOCYTES # BLD AUTO: 0.34 THOUSAND/ΜL (ref 0.17–1.22)
MONOCYTES NFR BLD AUTO: 8 % (ref 4–12)
NEUTROPHILS # BLD AUTO: 2.19 THOUSANDS/ΜL (ref 1.85–7.62)
NEUTS SEG NFR BLD AUTO: 52 % (ref 43–75)
NRBC BLD AUTO-RTO: 0 /100 WBCS
PLATELET # BLD AUTO: 281 THOUSANDS/UL (ref 149–390)
PMV BLD AUTO: 9.1 FL (ref 8.9–12.7)
POTASSIUM SERPL-SCNC: 3.8 MMOL/L (ref 3.5–5.3)
PROT SERPL-MCNC: 7.3 G/DL (ref 6.4–8.2)
RBC # BLD AUTO: 4.09 MILLION/UL (ref 3.81–5.12)
SODIUM SERPL-SCNC: 136 MMOL/L (ref 136–145)
WBC # BLD AUTO: 4.19 THOUSAND/UL (ref 4.31–10.16)

## 2018-09-19 PROCEDURE — 80053 COMPREHEN METABOLIC PANEL: CPT | Performed by: PHYSICIAN ASSISTANT

## 2018-09-19 PROCEDURE — 85025 COMPLETE CBC W/AUTO DIFF WBC: CPT | Performed by: PHYSICIAN ASSISTANT

## 2018-09-19 PROCEDURE — 83735 ASSAY OF MAGNESIUM: CPT | Performed by: PHYSICIAN ASSISTANT

## 2018-09-19 RX ADMIN — Medication 300 UNITS: at 11:00

## 2018-09-20 ENCOUNTER — OFFICE VISIT (OUTPATIENT)
Dept: GYNECOLOGIC ONCOLOGY | Facility: CLINIC | Age: 78
End: 2018-09-20
Payer: MEDICARE

## 2018-09-20 VITALS
BODY MASS INDEX: 30.71 KG/M2 | HEART RATE: 95 BPM | WEIGHT: 166.9 LBS | SYSTOLIC BLOOD PRESSURE: 142 MMHG | DIASTOLIC BLOOD PRESSURE: 76 MMHG | HEIGHT: 62 IN | TEMPERATURE: 98 F | RESPIRATION RATE: 23 BRPM

## 2018-09-20 DIAGNOSIS — R91.1 PULMONARY NODULE: ICD-10-CM

## 2018-09-20 DIAGNOSIS — I82.4Z2 DVT, LOWER EXTREMITY, DISTAL, ACUTE, LEFT (HCC): ICD-10-CM

## 2018-09-20 DIAGNOSIS — C57.9 GYNECOLOGIC CANCER (HCC): Primary | ICD-10-CM

## 2018-09-20 PROCEDURE — 99214 OFFICE O/P EST MOD 30 MIN: CPT | Performed by: PHYSICIAN ASSISTANT

## 2018-09-20 NOTE — PROGRESS NOTES
Assessment/Plan:    Problem List Items Addressed This Visit        Cardiovascular and Mediastinum    DVT, lower extremity, distal, acute, left ([facility] [address] )     Anticoagulated on Xarelto  Genitourinary    Gynecologic cancer [facility]) - Primary     Biopsy proven, 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, extensive ascites, 4 cm left adnexal mass and thickened endometrium  She is receiving neoadjuvant chemotherapy with taxol 80 mg/m2 weekly and carboplatin AUC 6 every 21 days  She received one cycle and tolerated this well  Her  jose de jesus, but she has significant symptom improvement  Continue with cycle 2 of treatment  Her metabolic and hematologic parameters were review  Will closely monitor  prior to cycle 3  If it continues to rise, will order repeat imaging  Return to the office as per her chemotherapy calendar  Other    Pulmonary nodule     1 cm right lower lobe nodule not FDG avid on PET imaging  Will plan to follow on repeat imaging in near future  CHIEF COMPLAINT:   Pre-chemotherapy evaluation    Problem:  1  Likely gynecologic origin, extensive ascites, 4 cm left adnexal mass, thickened endometrium  2  Left lower extremity DVT  Previous therapy:     Gynecologic cancer ([facility] [address] )    8/16/2018 Biopsy     Paracentesis, metastatic adenocarcinoma, consistent with gyn primary          Chemotherapy     Neoadjuvant chemotherapy with taxol 80 mg/m2 weekly and carboplatin AUC 6 every 21 days  She received 1 cycle and is scheduled for cycle 2  Patient ID: Maddie Cox is a 77 y o  female  who presents to the office for pre-chemotherapy evaluation  The patient tolerated her first cycle of treatment well  She has been afebrile  Normal bowel and bladder function  Significant improvement in abdominal distention  Appetite is appropriate  No vomiting  Patient denies chemotherapy-induced neuropathy           The following portions of the patient's history were reviewed and updated as appropriate: allergies, current medications, past medical history and problem list     Review of Systems   Constitutional: Positive for fatigue  HENT: Negative  Eyes: Negative  Respiratory: Negative  Cardiovascular: Negative  Gastrointestinal: Negative  Genitourinary: Negative  Musculoskeletal: Negative  Skin: Negative  Neurological: Negative  Psychiatric/Behavioral: Negative  Current Outpatient Prescriptions   Medication Sig Dispense Refill    rivaroxaban (XARELTO) 20 mg tablet Take 1 tablet (20 mg total) by mouth daily with breakfast 30 tablet 3    enoxaparin (LOVENOX) 40 mg/0 4 mL Inject 40 mg under the skin 2 (two) times a day      LORazepam (ATIVAN) 1 mg tablet Take 0 5 mg by mouth every 6 (six) hours as needed for anxiety (nausea)      ondansetron (ZOFRAN) 8 mg tablet Take 1 tablet (8 mg total) by mouth every 8 (eight) hours as needed for nausea or vomiting (Patient not taking: Reported on 9/20/2018 ) 30 tablet 1     No current facility-administered medications for this visit  Objective:    Blood pressure 142/76, pulse 95, temperature 98 °F (36 7 °C), temperature source Oral, resp  rate (!) 23, height 5' 1 8" (1 57 m), weight 75 7 kg (166 lb 14 4 oz)  Body mass index is 30 72 kg/m²  Body surface area is 1 77 meters squared  Physical Exam   Constitutional: She is oriented to person, place, and time  She appears well-developed and well-nourished  Pulmonary/Chest: Effort normal    Neurological: She is alert and oriented to person, place, and time  Skin: Skin is warm and dry  No rash noted  Psychiatric: She has a normal mood and affect   Her behavior is normal        Lab Results   Component Value Date     1,112 4 (H) 09/12/2018     Lab Results   Component Value Date     09/19/2018    K 3 8 09/19/2018     09/19/2018    CO2 27 09/19/2018    BUN 21 09/19/2018    CREATININE 0 71 09/19/2018    CALCIUM 8 8 09/19/2018    AST 16 09/19/2018 ALT 18 09/19/2018    ALKPHOS 121 (H) 09/19/2018    EGFR 81 09/19/2018     Lab Results   Component Value Date    WBC 4 19 (L) 09/19/2018    HGB 10 4 (L) 09/19/2018    HCT 34 1 (L) 09/19/2018    MCV 83 09/19/2018     09/19/2018     Lab Results   Component Value Date    NEUTROABS 2 19 09/19/2018

## 2018-09-20 NOTE — ASSESSMENT & PLAN NOTE
1 cm right lower lobe nodule not FDG avid on PET imaging  Will plan to follow on repeat imaging in near future

## 2018-09-20 NOTE — ASSESSMENT & PLAN NOTE
Biopsy proven, 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, extensive ascites, 4 cm left adnexal mass and thickened endometrium  She is receiving neoadjuvant chemotherapy with taxol 80 mg/m2 weekly and carboplatin AUC 6 every 21 days  She received one cycle and tolerated this well  Her  jose de jesus, but she has significant symptom improvement  Continue with cycle 2 of treatment  Her metabolic and hematologic parameters were review  Will closely monitor  prior to cycle 3  If it continues to rise, will order repeat imaging  Return to the office as per her chemotherapy calendar

## 2018-09-21 ENCOUNTER — HOSPITAL ENCOUNTER (OUTPATIENT)
Dept: INFUSION CENTER | Facility: HOSPITAL | Age: 78
Discharge: HOME | End: 2018-09-21
Payer: MEDICARE

## 2018-09-21 VITALS
DIASTOLIC BLOOD PRESSURE: 63 MMHG | WEIGHT: 167.55 LBS | OXYGEN SATURATION: 96 % | BODY MASS INDEX: 30.83 KG/M2 | TEMPERATURE: 98.8 F | SYSTOLIC BLOOD PRESSURE: 137 MMHG | HEIGHT: 62 IN | RESPIRATION RATE: 16 BRPM | HEART RATE: 98 BPM

## 2018-09-21 PROCEDURE — 96375 TX/PRO/DX INJ NEW DRUG ADDON: CPT

## 2018-09-21 PROCEDURE — 96367 TX/PROPH/DG ADDL SEQ IV INF: CPT

## 2018-09-21 PROCEDURE — 96417 CHEMO IV INFUS EACH ADDL SEQ: CPT

## 2018-09-21 PROCEDURE — 96413 CHEMO IV INFUSION 1 HR: CPT

## 2018-09-21 RX ORDER — SODIUM CHLORIDE 9 MG/ML
20 INJECTION, SOLUTION INTRAVENOUS ONCE
Status: COMPLETED | OUTPATIENT
Start: 2018-09-21 | End: 2018-09-21

## 2018-09-21 RX ORDER — PALONOSETRON 0.05 MG/ML
0.25 INJECTION, SOLUTION INTRAVENOUS ONCE
Status: COMPLETED | OUTPATIENT
Start: 2018-09-21 | End: 2018-09-21

## 2018-09-21 RX ADMIN — SODIUM CHLORIDE 150 MG: 0.9 INJECTION, SOLUTION INTRAVENOUS at 10:11

## 2018-09-21 RX ADMIN — PACLITAXEL 142 MG: 6 INJECTION, SOLUTION INTRAVENOUS at 10:49

## 2018-09-21 RX ADMIN — SODIUM CHLORIDE 20 ML/HR: 9 INJECTION, SOLUTION INTRAVENOUS at 08:56

## 2018-09-21 RX ADMIN — PALONOSETRON 0.25 MG: 0.05 INJECTION, SOLUTION INTRAVENOUS at 10:46

## 2018-09-21 RX ADMIN — CARBOPLATIN 521 MG: 10 INJECTION, SOLUTION INTRAVENOUS at 12:01

## 2018-09-21 RX ADMIN — DIPHENHYDRAMINE HYDROCHLORIDE 25 MG: 50 INJECTION, SOLUTION INTRAMUSCULAR; INTRAVENOUS at 08:56

## 2018-09-21 RX ADMIN — Medication 300 UNITS: at 13:06

## 2018-09-21 RX ADMIN — FAMOTIDINE 20 MG: 10 INJECTION, SOLUTION INTRAVENOUS at 09:50

## 2018-09-21 RX ADMIN — DEXAMETHASONE SODIUM PHOSPHATE 20 MG: 10 INJECTION, SOLUTION INTRAMUSCULAR; INTRAVENOUS at 09:20

## 2018-09-26 ENCOUNTER — HOSPITAL ENCOUNTER (OUTPATIENT)
Dept: INFUSION CENTER | Facility: HOSPITAL | Age: 78
Discharge: HOME | End: 2018-09-26
Payer: MEDICARE

## 2018-09-26 LAB
ALBUMIN SERPL BCP-MCNC: 3.3 G/DL (ref 3.5–5)
ALP SERPL-CCNC: 119 U/L (ref 46–116)
ALT SERPL W P-5'-P-CCNC: 20 U/L (ref 12–78)
ANION GAP SERPL CALCULATED.3IONS-SCNC: 7 MMOL/L (ref 4–13)
AST SERPL W P-5'-P-CCNC: 18 U/L (ref 5–45)
BASOPHILS # BLD AUTO: 0.03 THOUSANDS/ΜL (ref 0–0.1)
BASOPHILS NFR BLD AUTO: 1 % (ref 0–1)
BILIRUB SERPL-MCNC: 0.4 MG/DL (ref 0.2–1)
BUN SERPL-MCNC: 19 MG/DL (ref 5–25)
CALCIUM SERPL-MCNC: 8.6 MG/DL (ref 8.3–10.1)
CHLORIDE SERPL-SCNC: 102 MMOL/L (ref 100–108)
CO2 SERPL-SCNC: 26 MMOL/L (ref 21–32)
CREAT SERPL-MCNC: 0.73 MG/DL (ref 0.6–1.3)
EOSINOPHIL # BLD AUTO: 0.02 THOUSAND/ΜL (ref 0–0.61)
EOSINOPHIL NFR BLD AUTO: 1 % (ref 0–6)
ERYTHROCYTE [DISTWIDTH] IN BLOOD BY AUTOMATED COUNT: 17.5 % (ref 11.6–15.1)
GFR SERPL CREATININE-BSD FRML MDRD: 79 ML/MIN/1.73SQ M
GLUCOSE SERPL-MCNC: 110 MG/DL (ref 65–140)
HCT VFR BLD AUTO: 34.8 % (ref 34.8–46.1)
HGB BLD-MCNC: 10.7 G/DL (ref 11.5–15.4)
IMM GRANULOCYTES # BLD AUTO: 0.01 THOUSAND/UL (ref 0–0.2)
IMM GRANULOCYTES NFR BLD AUTO: 0 % (ref 0–2)
LYMPHOCYTES # BLD AUTO: 1.42 THOUSANDS/ΜL (ref 0.6–4.47)
LYMPHOCYTES NFR BLD AUTO: 41 % (ref 14–44)
MAGNESIUM SERPL-MCNC: 1.8 MG/DL (ref 1.6–2.6)
MCH RBC QN AUTO: 25.6 PG (ref 26.8–34.3)
MCHC RBC AUTO-ENTMCNC: 30.7 G/DL (ref 31.4–37.4)
MCV RBC AUTO: 83 FL (ref 82–98)
MONOCYTES # BLD AUTO: 0.15 THOUSAND/ΜL (ref 0.17–1.22)
MONOCYTES NFR BLD AUTO: 4 % (ref 4–12)
NEUTROPHILS # BLD AUTO: 1.87 THOUSANDS/ΜL (ref 1.85–7.62)
NEUTS SEG NFR BLD AUTO: 53 % (ref 43–75)
NRBC BLD AUTO-RTO: 0 /100 WBCS
PLATELET # BLD AUTO: 167 THOUSANDS/UL (ref 149–390)
PMV BLD AUTO: 9.3 FL (ref 8.9–12.7)
POTASSIUM SERPL-SCNC: 4.1 MMOL/L (ref 3.5–5.3)
PROT SERPL-MCNC: 7.2 G/DL (ref 6.4–8.2)
RBC # BLD AUTO: 4.18 MILLION/UL (ref 3.81–5.12)
SODIUM SERPL-SCNC: 135 MMOL/L (ref 136–145)
WBC # BLD AUTO: 3.5 THOUSAND/UL (ref 4.31–10.16)

## 2018-09-26 PROCEDURE — 83735 ASSAY OF MAGNESIUM: CPT | Performed by: PHYSICIAN ASSISTANT

## 2018-09-26 PROCEDURE — 85025 COMPLETE CBC W/AUTO DIFF WBC: CPT | Performed by: PHYSICIAN ASSISTANT

## 2018-09-26 PROCEDURE — 80053 COMPREHEN METABOLIC PANEL: CPT | Performed by: PHYSICIAN ASSISTANT

## 2018-09-26 RX ORDER — SODIUM CHLORIDE 9 MG/ML
20 INJECTION, SOLUTION INTRAVENOUS ONCE
Status: COMPLETED | OUTPATIENT
Start: 2018-09-28 | End: 2018-09-28

## 2018-09-26 RX ADMIN — Medication 300 UNITS: at 10:49

## 2018-09-28 ENCOUNTER — HOSPITAL ENCOUNTER (OUTPATIENT)
Dept: INFUSION CENTER | Facility: HOSPITAL | Age: 78
Discharge: HOME | End: 2018-09-28
Payer: MEDICARE

## 2018-09-28 VITALS
WEIGHT: 164.68 LBS | HEIGHT: 62 IN | BODY MASS INDEX: 30.31 KG/M2 | RESPIRATION RATE: 16 BRPM | TEMPERATURE: 98.6 F | OXYGEN SATURATION: 98 % | DIASTOLIC BLOOD PRESSURE: 72 MMHG | HEART RATE: 91 BPM | SYSTOLIC BLOOD PRESSURE: 148 MMHG

## 2018-09-28 PROCEDURE — 96367 TX/PROPH/DG ADDL SEQ IV INF: CPT

## 2018-09-28 PROCEDURE — 96413 CHEMO IV INFUSION 1 HR: CPT

## 2018-09-28 RX ADMIN — DIPHENHYDRAMINE HYDROCHLORIDE 25 MG: 50 INJECTION, SOLUTION INTRAMUSCULAR; INTRAVENOUS at 09:32

## 2018-09-28 RX ADMIN — DEXAMETHASONE SODIUM PHOSPHATE 20 MG: 10 INJECTION, SOLUTION INTRAMUSCULAR; INTRAVENOUS at 09:11

## 2018-09-28 RX ADMIN — Medication 300 UNITS: at 11:33

## 2018-09-28 RX ADMIN — FAMOTIDINE 20 MG: 10 INJECTION, SOLUTION INTRAVENOUS at 09:53

## 2018-09-28 RX ADMIN — SODIUM CHLORIDE 20 ML/HR: 9 INJECTION, SOLUTION INTRAVENOUS at 09:10

## 2018-09-28 RX ADMIN — PACLITAXEL 142 MG: 6 INJECTION, SOLUTION INTRAVENOUS at 10:21

## 2018-10-03 ENCOUNTER — HOSPITAL ENCOUNTER (OUTPATIENT)
Dept: INFUSION CENTER | Facility: HOSPITAL | Age: 78
Discharge: HOME | End: 2018-10-03
Payer: MEDICARE

## 2018-10-03 LAB
ALBUMIN SERPL BCP-MCNC: 3.2 G/DL (ref 3.5–5)
ALP SERPL-CCNC: 113 U/L (ref 46–116)
ALT SERPL W P-5'-P-CCNC: 23 U/L (ref 12–78)
ANION GAP SERPL CALCULATED.3IONS-SCNC: 10 MMOL/L (ref 4–13)
AST SERPL W P-5'-P-CCNC: 18 U/L (ref 5–45)
BASOPHILS # BLD AUTO: 0.02 THOUSANDS/ΜL (ref 0–0.1)
BASOPHILS NFR BLD AUTO: 1 % (ref 0–1)
BILIRUB SERPL-MCNC: 0.2 MG/DL (ref 0.2–1)
BUN SERPL-MCNC: 18 MG/DL (ref 5–25)
CALCIUM SERPL-MCNC: 8.5 MG/DL (ref 8.3–10.1)
CANCER AG125 SERPL-ACNC: 233.4 U/ML (ref 0–30)
CHLORIDE SERPL-SCNC: 104 MMOL/L (ref 100–108)
CO2 SERPL-SCNC: 26 MMOL/L (ref 21–32)
CREAT SERPL-MCNC: 0.67 MG/DL (ref 0.6–1.3)
EOSINOPHIL # BLD AUTO: 0.01 THOUSAND/ΜL (ref 0–0.61)
EOSINOPHIL NFR BLD AUTO: 0 % (ref 0–6)
ERYTHROCYTE [DISTWIDTH] IN BLOOD BY AUTOMATED COUNT: 18.2 % (ref 11.6–15.1)
GFR SERPL CREATININE-BSD FRML MDRD: 84 ML/MIN/1.73SQ M
GLUCOSE SERPL-MCNC: 128 MG/DL (ref 65–140)
HCT VFR BLD AUTO: 33.7 % (ref 34.8–46.1)
HGB BLD-MCNC: 10.4 G/DL (ref 11.5–15.4)
IMM GRANULOCYTES # BLD AUTO: 0 THOUSAND/UL (ref 0–0.2)
IMM GRANULOCYTES NFR BLD AUTO: 0 % (ref 0–2)
LYMPHOCYTES # BLD AUTO: 1.65 THOUSANDS/ΜL (ref 0.6–4.47)
LYMPHOCYTES NFR BLD AUTO: 61 % (ref 14–44)
MAGNESIUM SERPL-MCNC: 1.8 MG/DL (ref 1.6–2.6)
MCH RBC QN AUTO: 25.7 PG (ref 26.8–34.3)
MCHC RBC AUTO-ENTMCNC: 30.9 G/DL (ref 31.4–37.4)
MCV RBC AUTO: 83 FL (ref 82–98)
MONOCYTES # BLD AUTO: 0.32 THOUSAND/ΜL (ref 0.17–1.22)
MONOCYTES NFR BLD AUTO: 12 % (ref 4–12)
NEUTROPHILS # BLD AUTO: 0.7 THOUSANDS/ΜL (ref 1.85–7.62)
NEUTS SEG NFR BLD AUTO: 26 % (ref 43–75)
NRBC BLD AUTO-RTO: 0 /100 WBCS
PLATELET # BLD AUTO: 207 THOUSANDS/UL (ref 149–390)
PMV BLD AUTO: 9.1 FL (ref 8.9–12.7)
POTASSIUM SERPL-SCNC: 3.9 MMOL/L (ref 3.5–5.3)
PROT SERPL-MCNC: 7 G/DL (ref 6.4–8.2)
RBC # BLD AUTO: 4.04 MILLION/UL (ref 3.81–5.12)
SODIUM SERPL-SCNC: 140 MMOL/L (ref 136–145)
WBC # BLD AUTO: 2.7 THOUSAND/UL (ref 4.31–10.16)

## 2018-10-03 PROCEDURE — 86304 IMMUNOASSAY TUMOR CA 125: CPT | Performed by: PHYSICIAN ASSISTANT

## 2018-10-03 PROCEDURE — 83735 ASSAY OF MAGNESIUM: CPT | Performed by: PHYSICIAN ASSISTANT

## 2018-10-03 PROCEDURE — 80053 COMPREHEN METABOLIC PANEL: CPT | Performed by: PHYSICIAN ASSISTANT

## 2018-10-03 PROCEDURE — 85025 COMPLETE CBC W/AUTO DIFF WBC: CPT | Performed by: PHYSICIAN ASSISTANT

## 2018-10-03 RX ADMIN — Medication 300 UNITS: at 11:11

## 2018-10-04 RX ORDER — SODIUM CHLORIDE 9 MG/ML
20 INJECTION, SOLUTION INTRAVENOUS ONCE
Status: COMPLETED | OUTPATIENT
Start: 2018-10-05 | End: 2018-10-05

## 2018-10-05 ENCOUNTER — HOSPITAL ENCOUNTER (OUTPATIENT)
Dept: INFUSION CENTER | Facility: HOSPITAL | Age: 78
Discharge: HOME | End: 2018-10-05
Payer: MEDICARE

## 2018-10-05 VITALS
SYSTOLIC BLOOD PRESSURE: 146 MMHG | DIASTOLIC BLOOD PRESSURE: 67 MMHG | TEMPERATURE: 98.9 F | BODY MASS INDEX: 31.2 KG/M2 | WEIGHT: 169.53 LBS | OXYGEN SATURATION: 100 % | RESPIRATION RATE: 16 BRPM | HEART RATE: 102 BPM | HEIGHT: 62 IN

## 2018-10-05 LAB
BASOPHILS # BLD AUTO: 0.04 THOUSANDS/ΜL (ref 0–0.1)
BASOPHILS NFR BLD AUTO: 1 % (ref 0–1)
EOSINOPHIL # BLD AUTO: 0.02 THOUSAND/ΜL (ref 0–0.61)
EOSINOPHIL NFR BLD AUTO: 1 % (ref 0–6)
ERYTHROCYTE [DISTWIDTH] IN BLOOD BY AUTOMATED COUNT: 18.6 % (ref 11.6–15.1)
HCT VFR BLD AUTO: 32.6 % (ref 34.8–46.1)
HGB BLD-MCNC: 10.2 G/DL (ref 11.5–15.4)
IMM GRANULOCYTES # BLD AUTO: 0.02 THOUSAND/UL (ref 0–0.2)
IMM GRANULOCYTES NFR BLD AUTO: 1 % (ref 0–2)
LYMPHOCYTES # BLD AUTO: 1.68 THOUSANDS/ΜL (ref 0.6–4.47)
LYMPHOCYTES NFR BLD AUTO: 58 % (ref 14–44)
MCH RBC QN AUTO: 26 PG (ref 26.8–34.3)
MCHC RBC AUTO-ENTMCNC: 31.3 G/DL (ref 31.4–37.4)
MCV RBC AUTO: 83 FL (ref 82–98)
MONOCYTES # BLD AUTO: 0.4 THOUSAND/ΜL (ref 0.17–1.22)
MONOCYTES NFR BLD AUTO: 14 % (ref 4–12)
NEUTROPHILS # BLD AUTO: 0.73 THOUSANDS/ΜL (ref 1.85–7.62)
NEUTS SEG NFR BLD AUTO: 25 % (ref 43–75)
NRBC BLD AUTO-RTO: 0 /100 WBCS
PLATELET # BLD AUTO: 208 THOUSANDS/UL (ref 149–390)
PMV BLD AUTO: 8.9 FL (ref 8.9–12.7)
RBC # BLD AUTO: 3.92 MILLION/UL (ref 3.81–5.12)
WBC # BLD AUTO: 2.89 THOUSAND/UL (ref 4.31–10.16)

## 2018-10-05 PROCEDURE — 96367 TX/PROPH/DG ADDL SEQ IV INF: CPT

## 2018-10-05 PROCEDURE — 96413 CHEMO IV INFUSION 1 HR: CPT

## 2018-10-05 PROCEDURE — 85025 COMPLETE CBC W/AUTO DIFF WBC: CPT | Performed by: OBSTETRICS & GYNECOLOGY

## 2018-10-05 RX ADMIN — Medication 300 UNITS: at 12:20

## 2018-10-05 RX ADMIN — SODIUM CHLORIDE 20 ML/HR: 9 INJECTION, SOLUTION INTRAVENOUS at 09:35

## 2018-10-05 RX ADMIN — FAMOTIDINE 20 MG: 10 INJECTION, SOLUTION INTRAVENOUS at 09:56

## 2018-10-05 RX ADMIN — DEXAMETHASONE SODIUM PHOSPHATE 20 MG: 10 INJECTION, SOLUTION INTRAMUSCULAR; INTRAVENOUS at 09:35

## 2018-10-05 RX ADMIN — DIPHENHYDRAMINE HYDROCHLORIDE 25 MG: 50 INJECTION, SOLUTION INTRAMUSCULAR; INTRAVENOUS at 10:19

## 2018-10-05 RX ADMIN — PACLITAXEL 142 MG: 6 INJECTION, SOLUTION INTRAVENOUS at 11:08

## 2018-10-06 ENCOUNTER — HOSPITAL ENCOUNTER (OUTPATIENT)
Dept: INFUSION CENTER | Facility: HOSPITAL | Age: 78
Discharge: HOME | End: 2018-10-06
Payer: MEDICARE

## 2018-10-06 VITALS — TEMPERATURE: 96.8 F

## 2018-10-06 PROCEDURE — 96372 THER/PROPH/DIAG INJ SC/IM: CPT

## 2018-10-06 RX ADMIN — TBO-FILGRASTIM 300 MCG: 300 INJECTION, SOLUTION SUBCUTANEOUS at 10:53

## 2018-10-07 ENCOUNTER — HOSPITAL ENCOUNTER (OUTPATIENT)
Dept: INFUSION CENTER | Facility: HOSPITAL | Age: 78
Discharge: HOME | End: 2018-10-07
Payer: MEDICARE

## 2018-10-07 VITALS
SYSTOLIC BLOOD PRESSURE: 142 MMHG | OXYGEN SATURATION: 99 % | HEART RATE: 98 BPM | RESPIRATION RATE: 18 BRPM | TEMPERATURE: 97 F | DIASTOLIC BLOOD PRESSURE: 66 MMHG

## 2018-10-07 PROCEDURE — 96372 THER/PROPH/DIAG INJ SC/IM: CPT

## 2018-10-07 RX ADMIN — TBO-FILGRASTIM 300 MCG: 300 INJECTION, SOLUTION SUBCUTANEOUS at 10:51

## 2018-10-10 ENCOUNTER — HOSPITAL ENCOUNTER (OUTPATIENT)
Dept: INFUSION CENTER | Facility: HOSPITAL | Age: 78
Discharge: HOME | End: 2018-10-10
Payer: MEDICARE

## 2018-10-10 LAB
ALBUMIN SERPL BCP-MCNC: 3.3 G/DL (ref 3.5–5)
ALP SERPL-CCNC: 129 U/L (ref 46–116)
ALT SERPL W P-5'-P-CCNC: 20 U/L (ref 12–78)
ANION GAP SERPL CALCULATED.3IONS-SCNC: 11 MMOL/L (ref 4–13)
AST SERPL W P-5'-P-CCNC: 14 U/L (ref 5–45)
BASOPHILS # BLD AUTO: 0.02 THOUSANDS/ΜL (ref 0–0.1)
BASOPHILS NFR BLD AUTO: 1 % (ref 0–1)
BILIRUB SERPL-MCNC: 0.2 MG/DL (ref 0.2–1)
BUN SERPL-MCNC: 20 MG/DL (ref 5–25)
CALCIUM SERPL-MCNC: 8.9 MG/DL (ref 8.3–10.1)
CANCER AG125 SERPL-ACNC: 146.5 U/ML (ref 0–30)
CHLORIDE SERPL-SCNC: 102 MMOL/L (ref 100–108)
CO2 SERPL-SCNC: 26 MMOL/L (ref 21–32)
CREAT SERPL-MCNC: 0.75 MG/DL (ref 0.6–1.3)
EOSINOPHIL # BLD AUTO: 0.02 THOUSAND/ΜL (ref 0–0.61)
EOSINOPHIL NFR BLD AUTO: 1 % (ref 0–6)
ERYTHROCYTE [DISTWIDTH] IN BLOOD BY AUTOMATED COUNT: 20.1 % (ref 11.6–15.1)
GFR SERPL CREATININE-BSD FRML MDRD: 76 ML/MIN/1.73SQ M
GLUCOSE SERPL-MCNC: 130 MG/DL (ref 65–140)
HCT VFR BLD AUTO: 34.8 % (ref 34.8–46.1)
HGB BLD-MCNC: 10.9 G/DL (ref 11.5–15.4)
IMM GRANULOCYTES # BLD AUTO: 0.02 THOUSAND/UL (ref 0–0.2)
IMM GRANULOCYTES NFR BLD AUTO: 1 % (ref 0–2)
LYMPHOCYTES # BLD AUTO: 1.67 THOUSANDS/ΜL (ref 0.6–4.47)
LYMPHOCYTES NFR BLD AUTO: 46 % (ref 14–44)
MAGNESIUM SERPL-MCNC: 1.9 MG/DL (ref 1.6–2.6)
MCH RBC QN AUTO: 26.2 PG (ref 26.8–34.3)
MCHC RBC AUTO-ENTMCNC: 31.3 G/DL (ref 31.4–37.4)
MCV RBC AUTO: 84 FL (ref 82–98)
MONOCYTES # BLD AUTO: 0.36 THOUSAND/ΜL (ref 0.17–1.22)
MONOCYTES NFR BLD AUTO: 10 % (ref 4–12)
NEUTROPHILS # BLD AUTO: 1.46 THOUSANDS/ΜL (ref 1.85–7.62)
NEUTS SEG NFR BLD AUTO: 41 % (ref 43–75)
NRBC BLD AUTO-RTO: 0 /100 WBCS
PLATELET # BLD AUTO: 179 THOUSANDS/UL (ref 149–390)
PMV BLD AUTO: 9 FL (ref 8.9–12.7)
POTASSIUM SERPL-SCNC: 4.1 MMOL/L (ref 3.5–5.3)
PROT SERPL-MCNC: 7.1 G/DL (ref 6.4–8.2)
RBC # BLD AUTO: 4.16 MILLION/UL (ref 3.81–5.12)
SODIUM SERPL-SCNC: 139 MMOL/L (ref 136–145)
WBC # BLD AUTO: 3.55 THOUSAND/UL (ref 4.31–10.16)

## 2018-10-10 PROCEDURE — 83735 ASSAY OF MAGNESIUM: CPT | Performed by: PHYSICIAN ASSISTANT

## 2018-10-10 PROCEDURE — 86304 IMMUNOASSAY TUMOR CA 125: CPT | Performed by: PHYSICIAN ASSISTANT

## 2018-10-10 PROCEDURE — 85025 COMPLETE CBC W/AUTO DIFF WBC: CPT | Performed by: PHYSICIAN ASSISTANT

## 2018-10-10 PROCEDURE — 80053 COMPREHEN METABOLIC PANEL: CPT | Performed by: PHYSICIAN ASSISTANT

## 2018-10-10 RX ADMIN — Medication 300 UNITS: at 10:58

## 2018-10-11 ENCOUNTER — OFFICE VISIT (OUTPATIENT)
Dept: GYNECOLOGIC ONCOLOGY | Facility: CLINIC | Age: 78
End: 2018-10-11
Payer: MEDICARE

## 2018-10-11 ENCOUNTER — HOSPITAL ENCOUNTER (OUTPATIENT)
Dept: INFUSION CENTER | Facility: CLINIC | Age: 78
Discharge: HOME | End: 2018-10-11
Payer: MEDICARE

## 2018-10-11 VITALS
HEIGHT: 61 IN | HEART RATE: 92 BPM | DIASTOLIC BLOOD PRESSURE: 88 MMHG | SYSTOLIC BLOOD PRESSURE: 144 MMHG | BODY MASS INDEX: 31.34 KG/M2 | WEIGHT: 166 LBS | RESPIRATION RATE: 16 BRPM | TEMPERATURE: 98.3 F

## 2018-10-11 DIAGNOSIS — I82.4Z2 DVT, LOWER EXTREMITY, DISTAL, ACUTE, LEFT (HCC): ICD-10-CM

## 2018-10-11 DIAGNOSIS — R21 SKIN RASH: ICD-10-CM

## 2018-10-11 DIAGNOSIS — C57.9 GYNECOLOGIC MALIGNANCY (HCC): ICD-10-CM

## 2018-10-11 DIAGNOSIS — C57.9 GYNECOLOGIC CANCER (HCC): Primary | ICD-10-CM

## 2018-10-11 PROCEDURE — 99214 OFFICE O/P EST MOD 30 MIN: CPT | Performed by: PHYSICIAN ASSISTANT

## 2018-10-11 RX ORDER — PALONOSETRON 0.05 MG/ML
0.25 INJECTION, SOLUTION INTRAVENOUS ONCE
Status: COMPLETED | OUTPATIENT
Start: 2018-10-12 | End: 2018-10-12

## 2018-10-11 RX ORDER — METHYLPREDNISOLONE 4 MG/1
TABLET ORAL
Qty: 21 TABLET | Refills: 0 | Status: SHIPPED | OUTPATIENT
Start: 2018-10-11 | End: 2018-11-15

## 2018-10-11 RX ORDER — SODIUM CHLORIDE 9 MG/ML
20 INJECTION, SOLUTION INTRAVENOUS ONCE
Status: COMPLETED | OUTPATIENT
Start: 2018-10-12 | End: 2018-10-12

## 2018-10-11 RX ADMIN — HEPARIN 300 UNITS: 100 SYRINGE at 11:40

## 2018-10-11 NOTE — ASSESSMENT & PLAN NOTE
Biopsy proven, 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, extensive ascites, 4 cm left adnexal mass and thickened endometrium        She is receiving neoadjuvant chemotherapy with taxol 80 mg/m2 weekly and carboplatin AUC 6 every 21 days  She is tolerating treatment well  There is evidence of a biochemotherapy response  She experienced chemotherapy-induced neutropenia with cycle 2, requiring granix and a one week treatment delay  She had an allergic reaction to granix resulting in a skin rash       Continue with cycle 3 of treatment  Will dose reduce carboplatin to AUC 5 given neutropenia  Repeat CBC to be performed on arrival to chemotherapy tomorrow  Plan for CT imaging after completion of cycle 3 to evaluate disease response and plan for surgical debulking       Return to the office as per her chemotherapy calendar

## 2018-10-11 NOTE — PROGRESS NOTES
Patient was here for genetic testing labs per Howie Kearney,  She tolerated procedure well via port and was discharged post   Patient offered no c/o and [facility] previously scheduled

## 2018-10-11 NOTE — PLAN OF CARE
Problem: PAIN - ADULT  Goal: Verbalizes/displays adequate comfort level or baseline comfort level  Interventions:  - Encourage patient to monitor pain and request assistance  - Assess pain using appropriate pain scale  - Administer analgesics based on type and severity of pain and evaluate response  - Implement non-pharmacological measures as appropriate and evaluate response  - Consider cultural and social influences on pain and pain management  - Notify physician/advanced practitioner if interventions unsuccessful or patient reports new pain  Outcome: Progressing      Problem: SAFETY ADULT  Goal: Patient will remain free of falls  INTERVENTIONS:  - Assess patient frequently for physical needs  -  Identify cognitive and physical deficits and behaviors that affect risk of falls    -  Hanna fall precautions as indicated by assessment   - Educate patient/family on patient safety including physical limitations  - Instruct patient to call for assistance with activity based on assessment  - Modify environment to reduce risk of injury  - Consider OT/PT consult to assist with strengthening/mobility  Outcome: Progressing

## 2018-10-11 NOTE — ASSESSMENT & PLAN NOTE
Likely allergic reaction to granix  Rash is improving  Patient will receive IV steroids with chemotherapy tomorrow  I have provided a script for medrol dose ravinder  She will fill only if her rash hasn't resolved by Saturday

## 2018-10-11 NOTE — PROGRESS NOTES
Assessment/Plan:    Problem List Items Addressed This Visit        Cardiovascular and Mediastinum    DVT, lower extremity, distal, acute, left ([facility] [address] )     Anticoagulated on Xarelto  Musculoskeletal and Integument    Skin rash     Likely allergic reaction to granix  Rash is improving  Patient will receive IV steroids with chemotherapy tomorrow  I have provided a script for medrol dose ravinder  She will fill only if her rash hasn't resolved by Saturday  Relevant Medications    Methylprednisolone 4 MG TBPK       Genitourinary    [facility] - [facility]     Biopsy proven, 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, extensive ascites, 4 cm left adnexal mass and thickened endometrium        She is receiving neoadjuvant chemotherapy with taxol 80 mg/m2 weekly and carboplatin AUC 6 every 21 days  She is tolerating treatment well  There is evidence of a biochemotherapy response  She experienced chemotherapy-induced neutropenia with cycle 2, requiring granix and a one week treatment delay  She had an allergic reaction to granix resulting in a skin rash       Continue with cycle 3 of treatment  Will dose reduce carboplatin to AUC 5 given neutropenia  Repeat CBC to be performed on arrival to chemotherapy tomorrow  Plan for CT imaging after completion of cycle 3 to evaluate disease response and plan for surgical debulking       Return to the office as per her chemotherapy calendar  Other Visit Diagnoses     Gynecologic malignancy [facility])        Relevant Orders    CT chest abdomen pelvis w contrast            CHIEF COMPLAINT:   Pre-chemotherapy evaluation    Problem:  1  Likely gynecologic origin, extensive ascites, 4 cm left adnexal mass, thickened endometrium     2  Left lower extremity DVT      Previous therapy:     [facility]    8/16/2018 Biopsy     Paracentesis, metastatic adenocarcinoma, consistent with gyn primary          Chemotherapy     Neoadjuvant chemotherapy with taxol 80 mg/m2 weekly and carboplatin AUC 6 every 21 days  She received 2 cycles and is scheduled for cycle 3  Patient ID: Zay Rome is a 77 y o  female  who presents to the office for pre-chemotherapy evaluation  The patient is tolerating treatment well  She has been afebrile  Normal bowel/bladder function  No abdominal pain, nausea or vomiting  Appetite is appropriate  She denies chemotherapy induced neuropathy  The patient notes development of an itchy rash following administration of granix  This is slowly improving with aloe ointment  The following portions of the patient's history were reviewed and updated as appropriate: allergies, current medications, past medical history and problem list     Review of Systems   Constitutional: Positive for fatigue  HENT: Negative  Eyes: Negative  Respiratory: Negative  Cardiovascular: Negative  Gastrointestinal: Negative  Genitourinary: Negative  Musculoskeletal: Negative  Skin: Positive for rash  Neurological: Negative  Negative for numbness  Psychiatric/Behavioral: Negative  Current Outpatient Prescriptions   Medication Sig Dispense Refill    enoxaparin (LOVENOX) 40 mg/0 4 mL Inject 40 mg under the skin 2 (two) times a day      LORazepam (ATIVAN) 1 mg tablet Take 0 5 mg by mouth every 6 (six) hours as needed for anxiety (nausea)      ondansetron (ZOFRAN) 8 mg tablet Take 1 tablet (8 mg total) by mouth every 8 (eight) hours as needed for nausea or vomiting 30 tablet 1    rivaroxaban (XARELTO) 20 mg tablet Take 1 tablet (20 mg total) by mouth daily with breakfast 30 tablet 3     No current facility-administered medications for this visit        Facility-Administered Medications Ordered in Other Visits   Medication Dose Route Frequency Provider Last Rate Last Dose    alteplase (CATHFLO) injection 2 mg  2 mg Intracatheter PRN Andrez Murillo MD        heparin lock flush 100 units/mL injection 300 Units  300 Units Intracatheter PRN Miko Barksdale MD               Objective:    Blood pressure 144/88, pulse 92, temperature 98 3 °F (36 8 °C), temperature source Oral, resp  rate 16, height 5' 1" (1 549 m), weight 75 3 kg (166 lb)  Body mass index is 31 37 kg/m²  Body surface area is 1 75 meters squared  Physical Exam   Constitutional: She is oriented to person, place, and time  She appears well-developed and well-nourished  Pulmonary/Chest: Effort normal    Neurological: She is alert and oriented to person, place, and time  Skin: Skin is warm and dry  Rash (Maculopapular type rash noted on chest surface as well as b/l forearms) noted  Psychiatric: She has a normal mood and affect   Her behavior is normal        Lab Results   Component Value Date     146 5 (H) 10/10/2018     Lab Results   Component Value Date     10/10/2018    K 4 1 10/10/2018     10/10/2018    CO2 26 10/10/2018    BUN 20 10/10/2018    CREATININE 0 75 10/10/2018    CALCIUM 8 9 10/10/2018    AST 14 10/10/2018    ALT 20 10/10/2018    ALKPHOS 129 (H) 10/10/2018    EGFR 76 10/10/2018     Lab Results   Component Value Date    WBC 3 55 (L) 10/10/2018    HGB 10 9 (L) 10/10/2018    HCT 34 8 10/10/2018    MCV 84 10/10/2018     10/10/2018     Lab Results   Component Value Date    NEUTROABS 1 46 (L) 10/10/2018

## 2018-10-12 ENCOUNTER — HOSPITAL ENCOUNTER (OUTPATIENT)
Dept: INFUSION CENTER | Facility: HOSPITAL | Age: 78
Discharge: HOME | End: 2018-10-12
Payer: MEDICARE

## 2018-10-12 VITALS
WEIGHT: 168.43 LBS | TEMPERATURE: 98.9 F | HEART RATE: 87 BPM | DIASTOLIC BLOOD PRESSURE: 68 MMHG | SYSTOLIC BLOOD PRESSURE: 142 MMHG | RESPIRATION RATE: 18 BRPM | HEIGHT: 61 IN | BODY MASS INDEX: 31.8 KG/M2 | OXYGEN SATURATION: 96 %

## 2018-10-12 LAB
BASOPHILS # BLD AUTO: 0.02 THOUSANDS/ΜL (ref 0–0.1)
BASOPHILS NFR BLD AUTO: 1 % (ref 0–1)
EOSINOPHIL # BLD AUTO: 0.03 THOUSAND/ΜL (ref 0–0.61)
EOSINOPHIL NFR BLD AUTO: 1 % (ref 0–6)
ERYTHROCYTE [DISTWIDTH] IN BLOOD BY AUTOMATED COUNT: 20 % (ref 11.6–15.1)
HCT VFR BLD AUTO: 32.8 % (ref 34.8–46.1)
HGB BLD-MCNC: 10.2 G/DL (ref 11.5–15.4)
IMM GRANULOCYTES # BLD AUTO: 0.06 THOUSAND/UL (ref 0–0.2)
IMM GRANULOCYTES NFR BLD AUTO: 1 % (ref 0–2)
LYMPHOCYTES # BLD AUTO: 1.8 THOUSANDS/ΜL (ref 0.6–4.47)
LYMPHOCYTES NFR BLD AUTO: 42 % (ref 14–44)
MCH RBC QN AUTO: 26.2 PG (ref 26.8–34.3)
MCHC RBC AUTO-ENTMCNC: 31.1 G/DL (ref 31.4–37.4)
MCV RBC AUTO: 84 FL (ref 82–98)
MONOCYTES # BLD AUTO: 0.64 THOUSAND/ΜL (ref 0.17–1.22)
MONOCYTES NFR BLD AUTO: 15 % (ref 4–12)
NEUTROPHILS # BLD AUTO: 1.71 THOUSANDS/ΜL (ref 1.85–7.62)
NEUTS SEG NFR BLD AUTO: 40 % (ref 43–75)
NRBC BLD AUTO-RTO: 0 /100 WBCS
PLATELET # BLD AUTO: 164 THOUSANDS/UL (ref 149–390)
PMV BLD AUTO: 9 FL (ref 8.9–12.7)
RBC # BLD AUTO: 3.89 MILLION/UL (ref 3.81–5.12)
WBC # BLD AUTO: 4.26 THOUSAND/UL (ref 4.31–10.16)

## 2018-10-12 PROCEDURE — 96367 TX/PROPH/DG ADDL SEQ IV INF: CPT

## 2018-10-12 PROCEDURE — 96375 TX/PRO/DX INJ NEW DRUG ADDON: CPT

## 2018-10-12 PROCEDURE — 96417 CHEMO IV INFUS EACH ADDL SEQ: CPT

## 2018-10-12 PROCEDURE — 96413 CHEMO IV INFUSION 1 HR: CPT

## 2018-10-12 PROCEDURE — 85025 COMPLETE CBC W/AUTO DIFF WBC: CPT | Performed by: OBSTETRICS & GYNECOLOGY

## 2018-10-12 RX ADMIN — Medication 300 UNITS: at 13:58

## 2018-10-12 RX ADMIN — DIPHENHYDRAMINE HYDROCHLORIDE 25 MG: 50 INJECTION, SOLUTION INTRAMUSCULAR; INTRAVENOUS at 10:23

## 2018-10-12 RX ADMIN — DEXAMETHASONE SODIUM PHOSPHATE 20 MG: 10 INJECTION, SOLUTION INTRAMUSCULAR; INTRAVENOUS at 09:38

## 2018-10-12 RX ADMIN — CARBOPLATIN 407 MG: 10 INJECTION, SOLUTION INTRAVENOUS at 12:49

## 2018-10-12 RX ADMIN — SODIUM CHLORIDE 150 MG: 0.9 INJECTION, SOLUTION INTRAVENOUS at 10:53

## 2018-10-12 RX ADMIN — FAMOTIDINE 20 MG: 10 INJECTION, SOLUTION INTRAVENOUS at 10:01

## 2018-10-12 RX ADMIN — PACLITAXEL 140 MG: 6 INJECTION, SOLUTION INTRAVENOUS at 11:38

## 2018-10-12 RX ADMIN — PALONOSETRON HYDROCHLORIDE 0.25 MG: 0.25 INJECTION INTRAVENOUS at 11:26

## 2018-10-12 RX ADMIN — SODIUM CHLORIDE 20 ML/HR: 9 INJECTION, SOLUTION INTRAVENOUS at 09:36

## 2018-10-12 NOTE — PROGRESS NOTES
CBC w/ diff repeated  ANC improved at 1710  [address] aware and ok to proceed with chemo today  Pt has rash on chest and left arm  [address] aware (possibly reaction from Granix) and pt to start medrol pack if not improved by tomorrow  Pt states it has gotten better

## 2018-10-12 NOTE — PATIENT INSTRUCTIONS
Steroid pack Rx already called in to [facility]  If rash not improved by tomorrow (Saturday),  rx and take as instructed

## 2018-10-17 ENCOUNTER — HOSPITAL ENCOUNTER (OUTPATIENT)
Dept: INFUSION CENTER | Facility: HOSPITAL | Age: 78
Discharge: HOME | End: 2018-10-17
Payer: MEDICARE

## 2018-10-17 LAB
ALBUMIN SERPL BCP-MCNC: 3.5 G/DL (ref 3.5–5)
ALP SERPL-CCNC: 111 U/L (ref 46–116)
ALT SERPL W P-5'-P-CCNC: 23 U/L (ref 12–78)
ANION GAP SERPL CALCULATED.3IONS-SCNC: 9 MMOL/L (ref 4–13)
AST SERPL W P-5'-P-CCNC: 17 U/L (ref 5–45)
BASOPHILS # BLD AUTO: 0.01 THOUSANDS/ΜL (ref 0–0.1)
BASOPHILS NFR BLD AUTO: 0 % (ref 0–1)
BILIRUB SERPL-MCNC: 0.4 MG/DL (ref 0.2–1)
BUN SERPL-MCNC: 25 MG/DL (ref 5–25)
CALCIUM SERPL-MCNC: 8.7 MG/DL (ref 8.3–10.1)
CHLORIDE SERPL-SCNC: 102 MMOL/L (ref 100–108)
CO2 SERPL-SCNC: 27 MMOL/L (ref 21–32)
CREAT SERPL-MCNC: 0.82 MG/DL (ref 0.6–1.3)
EOSINOPHIL # BLD AUTO: 0.01 THOUSAND/ΜL (ref 0–0.61)
EOSINOPHIL NFR BLD AUTO: 0 % (ref 0–6)
ERYTHROCYTE [DISTWIDTH] IN BLOOD BY AUTOMATED COUNT: 21.1 % (ref 11.6–15.1)
GFR SERPL CREATININE-BSD FRML MDRD: 68 ML/MIN/1.73SQ M
GLUCOSE SERPL-MCNC: 113 MG/DL (ref 65–140)
HCT VFR BLD AUTO: 35.4 % (ref 34.8–46.1)
HGB BLD-MCNC: 11 G/DL (ref 11.5–15.4)
IMM GRANULOCYTES # BLD AUTO: 0.01 THOUSAND/UL (ref 0–0.2)
IMM GRANULOCYTES NFR BLD AUTO: 0 % (ref 0–2)
LYMPHOCYTES # BLD AUTO: 1.07 THOUSANDS/ΜL (ref 0.6–4.47)
LYMPHOCYTES NFR BLD AUTO: 44 % (ref 14–44)
MAGNESIUM SERPL-MCNC: 1.9 MG/DL (ref 1.6–2.6)
MCH RBC QN AUTO: 26.1 PG (ref 26.8–34.3)
MCHC RBC AUTO-ENTMCNC: 31.1 G/DL (ref 31.4–37.4)
MCV RBC AUTO: 84 FL (ref 82–98)
MONOCYTES # BLD AUTO: 0.12 THOUSAND/ΜL (ref 0.17–1.22)
MONOCYTES NFR BLD AUTO: 5 % (ref 4–12)
NEUTROPHILS # BLD AUTO: 1.21 THOUSANDS/ΜL (ref 1.85–7.62)
NEUTS SEG NFR BLD AUTO: 51 % (ref 43–75)
NRBC BLD AUTO-RTO: 0 /100 WBCS
PLATELET # BLD AUTO: 220 THOUSANDS/UL (ref 149–390)
PMV BLD AUTO: 9.1 FL (ref 8.9–12.7)
POTASSIUM SERPL-SCNC: 3.8 MMOL/L (ref 3.5–5.3)
PROT SERPL-MCNC: 7.1 G/DL (ref 6.4–8.2)
RBC # BLD AUTO: 4.21 MILLION/UL (ref 3.81–5.12)
SODIUM SERPL-SCNC: 138 MMOL/L (ref 136–145)
WBC # BLD AUTO: 2.43 THOUSAND/UL (ref 4.31–10.16)

## 2018-10-17 PROCEDURE — 85025 COMPLETE CBC W/AUTO DIFF WBC: CPT | Performed by: OBSTETRICS & GYNECOLOGY

## 2018-10-17 PROCEDURE — 80053 COMPREHEN METABOLIC PANEL: CPT | Performed by: OBSTETRICS & GYNECOLOGY

## 2018-10-17 PROCEDURE — 83735 ASSAY OF MAGNESIUM: CPT | Performed by: OBSTETRICS & GYNECOLOGY

## 2018-10-17 RX ADMIN — Medication 300 UNITS: at 10:50

## 2018-10-19 ENCOUNTER — HOSPITAL ENCOUNTER (OUTPATIENT)
Dept: INFUSION CENTER | Facility: HOSPITAL | Age: 78
Discharge: HOME | End: 2018-10-19
Payer: MEDICARE

## 2018-10-19 VITALS
HEIGHT: 61 IN | WEIGHT: 159.61 LBS | BODY MASS INDEX: 30.14 KG/M2 | DIASTOLIC BLOOD PRESSURE: 84 MMHG | SYSTOLIC BLOOD PRESSURE: 143 MMHG | TEMPERATURE: 98.4 F | HEART RATE: 79 BPM | OXYGEN SATURATION: 100 % | RESPIRATION RATE: 16 BRPM

## 2018-10-19 PROCEDURE — 96367 TX/PROPH/DG ADDL SEQ IV INF: CPT

## 2018-10-19 PROCEDURE — 96413 CHEMO IV INFUSION 1 HR: CPT

## 2018-10-19 RX ORDER — SODIUM CHLORIDE 9 MG/ML
20 INJECTION, SOLUTION INTRAVENOUS ONCE
Status: COMPLETED | OUTPATIENT
Start: 2018-10-19 | End: 2018-10-19

## 2018-10-19 RX ADMIN — DEXAMETHASONE SODIUM PHOSPHATE 20 MG: 10 INJECTION, SOLUTION INTRAMUSCULAR; INTRAVENOUS at 09:17

## 2018-10-19 RX ADMIN — PACLITAXEL 140 MG: 6 INJECTION, SOLUTION INTRAVENOUS at 10:31

## 2018-10-19 RX ADMIN — Medication 300 UNITS: at 11:35

## 2018-10-19 RX ADMIN — DIPHENHYDRAMINE HYDROCHLORIDE 25 MG: 50 INJECTION, SOLUTION INTRAMUSCULAR; INTRAVENOUS at 09:38

## 2018-10-19 RX ADMIN — SODIUM CHLORIDE 20 ML/HR: 0.9 INJECTION, SOLUTION INTRAVENOUS at 09:17

## 2018-10-19 RX ADMIN — FAMOTIDINE 20 MG: 10 INJECTION, SOLUTION INTRAVENOUS at 10:02

## 2018-10-24 ENCOUNTER — HOSPITAL ENCOUNTER (OUTPATIENT)
Dept: INFUSION CENTER | Facility: HOSPITAL | Age: 78
Discharge: HOME | End: 2018-10-24
Payer: MEDICARE

## 2018-10-24 DIAGNOSIS — R30.0 DYSURIA: ICD-10-CM

## 2018-10-24 DIAGNOSIS — R30.0 DYSURIA: Primary | ICD-10-CM

## 2018-10-24 LAB
ALBUMIN SERPL BCP-MCNC: 3.2 G/DL (ref 3.5–5)
ALP SERPL-CCNC: 106 U/L (ref 46–116)
ALT SERPL W P-5'-P-CCNC: 22 U/L (ref 12–78)
ANION GAP SERPL CALCULATED.3IONS-SCNC: 11 MMOL/L (ref 4–13)
AST SERPL W P-5'-P-CCNC: 17 U/L (ref 5–45)
BACTERIA UR QL AUTO: ABNORMAL /HPF
BASOPHILS # BLD AUTO: 0.02 THOUSANDS/ΜL (ref 0–0.1)
BASOPHILS NFR BLD AUTO: 1 % (ref 0–1)
BILIRUB SERPL-MCNC: 0.3 MG/DL (ref 0.2–1)
BILIRUB UR QL STRIP: NEGATIVE
BUN SERPL-MCNC: 17 MG/DL (ref 5–25)
CALCIUM SERPL-MCNC: 8.7 MG/DL (ref 8.3–10.1)
CHLORIDE SERPL-SCNC: 105 MMOL/L (ref 100–108)
CLARITY UR: ABNORMAL
CO2 SERPL-SCNC: 25 MMOL/L (ref 21–32)
COLOR UR: YELLOW
CREAT SERPL-MCNC: 0.75 MG/DL (ref 0.6–1.3)
EOSINOPHIL # BLD AUTO: 0 THOUSAND/ΜL (ref 0–0.61)
EOSINOPHIL NFR BLD AUTO: 0 % (ref 0–6)
ERYTHROCYTE [DISTWIDTH] IN BLOOD BY AUTOMATED COUNT: 22.3 % (ref 11.6–15.1)
GFR SERPL CREATININE-BSD FRML MDRD: 76 ML/MIN/1.73SQ M
GLUCOSE SERPL-MCNC: 112 MG/DL (ref 65–140)
GLUCOSE UR STRIP-MCNC: NEGATIVE MG/DL
HCT VFR BLD AUTO: 33.3 % (ref 34.8–46.1)
HGB BLD-MCNC: 10.3 G/DL (ref 11.5–15.4)
HGB UR QL STRIP.AUTO: ABNORMAL
IMM GRANULOCYTES # BLD AUTO: 0 THOUSAND/UL (ref 0–0.2)
IMM GRANULOCYTES NFR BLD AUTO: 0 % (ref 0–2)
KETONES UR STRIP-MCNC: ABNORMAL MG/DL
LEUKOCYTE ESTERASE UR QL STRIP: ABNORMAL
LYMPHOCYTES # BLD AUTO: 1.77 THOUSANDS/ΜL (ref 0.6–4.47)
LYMPHOCYTES NFR BLD AUTO: 60 % (ref 14–44)
MAGNESIUM SERPL-MCNC: 1.8 MG/DL (ref 1.6–2.6)
MCH RBC QN AUTO: 26.6 PG (ref 26.8–34.3)
MCHC RBC AUTO-ENTMCNC: 30.9 G/DL (ref 31.4–37.4)
MCV RBC AUTO: 86 FL (ref 82–98)
MONOCYTES # BLD AUTO: 0.32 THOUSAND/ΜL (ref 0.17–1.22)
MONOCYTES NFR BLD AUTO: 11 % (ref 4–12)
NEUTROPHILS # BLD AUTO: 0.83 THOUSANDS/ΜL (ref 1.85–7.62)
NEUTS SEG NFR BLD AUTO: 28 % (ref 43–75)
NITRITE UR QL STRIP: NEGATIVE
NON-SQ EPI CELLS URNS QL MICRO: ABNORMAL /HPF
NRBC BLD AUTO-RTO: 0 /100 WBCS
PH UR STRIP.AUTO: 5.5 [PH] (ref 5–9)
PLATELET # BLD AUTO: 250 THOUSANDS/UL (ref 149–390)
PMV BLD AUTO: 9.2 FL (ref 8.9–12.7)
POTASSIUM SERPL-SCNC: 3.8 MMOL/L (ref 3.5–5.3)
PROT SERPL-MCNC: 6.7 G/DL (ref 6.4–8.2)
PROT UR STRIP-MCNC: ABNORMAL MG/DL
RBC # BLD AUTO: 3.87 MILLION/UL (ref 3.81–5.12)
RBC #/AREA URNS AUTO: ABNORMAL /HPF
SODIUM SERPL-SCNC: 141 MMOL/L (ref 136–145)
SP GR UR STRIP.AUTO: >=1.03 (ref 1–1.03)
UROBILINOGEN UR QL STRIP.AUTO: 0.2 E.U./DL
WBC # BLD AUTO: 2.94 THOUSAND/UL (ref 4.31–10.16)
WBC #/AREA URNS AUTO: ABNORMAL /HPF

## 2018-10-24 PROCEDURE — 83735 ASSAY OF MAGNESIUM: CPT | Performed by: OBSTETRICS & GYNECOLOGY

## 2018-10-24 PROCEDURE — 81001 URINALYSIS AUTO W/SCOPE: CPT

## 2018-10-24 PROCEDURE — 80053 COMPREHEN METABOLIC PANEL: CPT | Performed by: OBSTETRICS & GYNECOLOGY

## 2018-10-24 PROCEDURE — 85025 COMPLETE CBC W/AUTO DIFF WBC: CPT | Performed by: OBSTETRICS & GYNECOLOGY

## 2018-10-24 PROCEDURE — 87086 URINE CULTURE/COLONY COUNT: CPT

## 2018-10-24 RX ORDER — CIPROFLOXACIN 500 MG/1
500 TABLET, FILM COATED ORAL EVERY 12 HOURS SCHEDULED
Qty: 10 TABLET | Refills: 0 | Status: SHIPPED | OUTPATIENT
Start: 2018-10-24 | End: 2018-10-29

## 2018-10-24 RX ADMIN — Medication 300 UNITS: at 11:03

## 2018-10-25 LAB — BACTERIA UR CULT: NORMAL

## 2018-10-25 RX ORDER — SODIUM CHLORIDE 9 MG/ML
20 INJECTION, SOLUTION INTRAVENOUS ONCE
Status: DISCONTINUED | OUTPATIENT
Start: 2018-10-26 | End: 2018-10-29 | Stop reason: HOSPADM

## 2018-10-26 ENCOUNTER — HOSPITAL ENCOUNTER (OUTPATIENT)
Dept: INFUSION CENTER | Facility: HOSPITAL | Age: 78
Discharge: HOME | End: 2018-10-26
Payer: MEDICARE

## 2018-10-26 VITALS
TEMPERATURE: 98.3 F | DIASTOLIC BLOOD PRESSURE: 80 MMHG | HEIGHT: 61 IN | SYSTOLIC BLOOD PRESSURE: 175 MMHG | OXYGEN SATURATION: 97 % | RESPIRATION RATE: 16 BRPM | HEART RATE: 107 BPM | WEIGHT: 168.43 LBS | BODY MASS INDEX: 31.8 KG/M2

## 2018-10-26 LAB
BASOPHILS # BLD AUTO: 0.03 THOUSANDS/ΜL (ref 0–0.1)
BASOPHILS NFR BLD AUTO: 1 % (ref 0–1)
EOSINOPHIL # BLD AUTO: 0.01 THOUSAND/ΜL (ref 0–0.61)
EOSINOPHIL NFR BLD AUTO: 0 % (ref 0–6)
ERYTHROCYTE [DISTWIDTH] IN BLOOD BY AUTOMATED COUNT: 22.4 % (ref 11.6–15.1)
HCT VFR BLD AUTO: 32.2 % (ref 34.8–46.1)
HGB BLD-MCNC: 10.1 G/DL (ref 11.5–15.4)
IMM GRANULOCYTES # BLD AUTO: 0 THOUSAND/UL (ref 0–0.2)
IMM GRANULOCYTES NFR BLD AUTO: 0 % (ref 0–2)
LYMPHOCYTES # BLD AUTO: 1.79 THOUSANDS/ΜL (ref 0.6–4.47)
LYMPHOCYTES NFR BLD AUTO: 57 % (ref 14–44)
MCH RBC QN AUTO: 27.1 PG (ref 26.8–34.3)
MCHC RBC AUTO-ENTMCNC: 31.4 G/DL (ref 31.4–37.4)
MCV RBC AUTO: 86 FL (ref 82–98)
MONOCYTES # BLD AUTO: 0.56 THOUSAND/ΜL (ref 0.17–1.22)
MONOCYTES NFR BLD AUTO: 18 % (ref 4–12)
NEUTROPHILS # BLD AUTO: 0.76 THOUSANDS/ΜL (ref 1.85–7.62)
NEUTS SEG NFR BLD AUTO: 24 % (ref 43–75)
NRBC BLD AUTO-RTO: 0 /100 WBCS
PLATELET # BLD AUTO: 219 THOUSANDS/UL (ref 149–390)
PMV BLD AUTO: 8.8 FL (ref 8.9–12.7)
RBC # BLD AUTO: 3.73 MILLION/UL (ref 3.81–5.12)
WBC # BLD AUTO: 3.15 THOUSAND/UL (ref 4.31–10.16)

## 2018-10-26 PROCEDURE — 85025 COMPLETE CBC W/AUTO DIFF WBC: CPT | Performed by: OBSTETRICS & GYNECOLOGY

## 2018-10-26 RX ADMIN — Medication 300 UNITS: at 09:23

## 2018-10-31 ENCOUNTER — HOSPITAL ENCOUNTER (OUTPATIENT)
Dept: INFUSION CENTER | Facility: HOSPITAL | Age: 78
Discharge: HOME | End: 2018-10-31
Payer: MEDICARE

## 2018-10-31 LAB
ALBUMIN SERPL BCP-MCNC: 3 G/DL (ref 3.5–5)
ALP SERPL-CCNC: 110 U/L (ref 46–116)
ALT SERPL W P-5'-P-CCNC: 11 U/L (ref 12–78)
ANION GAP SERPL CALCULATED.3IONS-SCNC: 8 MMOL/L (ref 4–13)
AST SERPL W P-5'-P-CCNC: 12 U/L (ref 5–45)
BASOPHILS # BLD AUTO: 0.04 THOUSANDS/ΜL (ref 0–0.1)
BASOPHILS NFR BLD AUTO: 1 % (ref 0–1)
BILIRUB SERPL-MCNC: 0.2 MG/DL (ref 0.2–1)
BUN SERPL-MCNC: 18 MG/DL (ref 5–25)
CALCIUM SERPL-MCNC: 8.6 MG/DL (ref 8.3–10.1)
CANCER AG125 SERPL-ACNC: 37.1 U/ML (ref 0–30)
CHLORIDE SERPL-SCNC: 106 MMOL/L (ref 100–108)
CO2 SERPL-SCNC: 26 MMOL/L (ref 21–32)
CREAT SERPL-MCNC: 0.73 MG/DL (ref 0.6–1.3)
EOSINOPHIL # BLD AUTO: 0.01 THOUSAND/ΜL (ref 0–0.61)
EOSINOPHIL NFR BLD AUTO: 0 % (ref 0–6)
ERYTHROCYTE [DISTWIDTH] IN BLOOD BY AUTOMATED COUNT: 22.9 % (ref 11.6–15.1)
GFR SERPL CREATININE-BSD FRML MDRD: 79 ML/MIN/1.73SQ M
GLUCOSE SERPL-MCNC: 124 MG/DL (ref 65–140)
HCT VFR BLD AUTO: 32.4 % (ref 34.8–46.1)
HGB BLD-MCNC: 10.2 G/DL (ref 11.5–15.4)
IMM GRANULOCYTES # BLD AUTO: 0.02 THOUSAND/UL (ref 0–0.2)
IMM GRANULOCYTES NFR BLD AUTO: 1 % (ref 0–2)
LYMPHOCYTES # BLD AUTO: 1.86 THOUSANDS/ΜL (ref 0.6–4.47)
LYMPHOCYTES NFR BLD AUTO: 43 % (ref 14–44)
MAGNESIUM SERPL-MCNC: 1.8 MG/DL (ref 1.6–2.6)
MCH RBC QN AUTO: 27.3 PG (ref 26.8–34.3)
MCHC RBC AUTO-ENTMCNC: 31.5 G/DL (ref 31.4–37.4)
MCV RBC AUTO: 87 FL (ref 82–98)
MONOCYTES # BLD AUTO: 0.61 THOUSAND/ΜL (ref 0.17–1.22)
MONOCYTES NFR BLD AUTO: 15 % (ref 4–12)
NEUTROPHILS # BLD AUTO: 1.66 THOUSANDS/ΜL (ref 1.85–7.62)
NEUTS SEG NFR BLD AUTO: 40 % (ref 43–75)
NRBC BLD AUTO-RTO: 0 /100 WBCS
PLATELET # BLD AUTO: 172 THOUSANDS/UL (ref 149–390)
PMV BLD AUTO: 8.5 FL (ref 8.9–12.7)
POTASSIUM SERPL-SCNC: 3.6 MMOL/L (ref 3.5–5.3)
PROT SERPL-MCNC: 6.5 G/DL (ref 6.4–8.2)
RBC # BLD AUTO: 3.73 MILLION/UL (ref 3.81–5.12)
SODIUM SERPL-SCNC: 140 MMOL/L (ref 136–145)
WBC # BLD AUTO: 4.2 THOUSAND/UL (ref 4.31–10.16)

## 2018-10-31 PROCEDURE — 83735 ASSAY OF MAGNESIUM: CPT | Performed by: PHYSICIAN ASSISTANT

## 2018-10-31 PROCEDURE — 80053 COMPREHEN METABOLIC PANEL: CPT | Performed by: PHYSICIAN ASSISTANT

## 2018-10-31 PROCEDURE — 85025 COMPLETE CBC W/AUTO DIFF WBC: CPT | Performed by: PHYSICIAN ASSISTANT

## 2018-10-31 PROCEDURE — 86304 IMMUNOASSAY TUMOR CA 125: CPT | Performed by: PHYSICIAN ASSISTANT

## 2018-10-31 RX ADMIN — Medication 300 UNITS: at 11:01

## 2018-11-05 ENCOUNTER — HOSPITAL ENCOUNTER (OUTPATIENT)
Dept: RADIOLOGY | Facility: HOSPITAL | Age: 78
Discharge: HOME | End: 2018-11-05
Payer: MEDICARE

## 2018-11-05 DIAGNOSIS — C57.9 GYNECOLOGIC MALIGNANCY (HCC): ICD-10-CM

## 2018-11-05 PROCEDURE — 74177 CT ABD & PELVIS W/CONTRAST: CPT

## 2018-11-05 PROCEDURE — 71260 CT THORAX DX C+: CPT

## 2018-11-05 RX ADMIN — IOHEXOL 100 ML: 350 INJECTION, SOLUTION INTRAVENOUS at 14:59

## 2018-11-07 ENCOUNTER — HOSPITAL ENCOUNTER (OUTPATIENT)
Dept: INFUSION CENTER | Facility: HOSPITAL | Age: 78
Discharge: HOME | End: 2018-11-07
Payer: MEDICARE

## 2018-11-07 LAB
ALBUMIN SERPL BCP-MCNC: 3.2 G/DL (ref 3.5–5)
ALP SERPL-CCNC: 120 U/L (ref 46–116)
ALT SERPL W P-5'-P-CCNC: 17 U/L (ref 12–78)
ANION GAP SERPL CALCULATED.3IONS-SCNC: 9 MMOL/L (ref 4–13)
AST SERPL W P-5'-P-CCNC: 15 U/L (ref 5–45)
BASOPHILS # BLD AUTO: 0.05 THOUSANDS/ΜL (ref 0–0.1)
BASOPHILS NFR BLD AUTO: 1 % (ref 0–1)
BILIRUB SERPL-MCNC: 0.2 MG/DL (ref 0.2–1)
BUN SERPL-MCNC: 17 MG/DL (ref 5–25)
CALCIUM SERPL-MCNC: 9 MG/DL (ref 8.3–10.1)
CHLORIDE SERPL-SCNC: 104 MMOL/L (ref 100–108)
CO2 SERPL-SCNC: 27 MMOL/L (ref 21–32)
CREAT SERPL-MCNC: 0.71 MG/DL (ref 0.6–1.3)
EOSINOPHIL # BLD AUTO: 0.04 THOUSAND/ΜL (ref 0–0.61)
EOSINOPHIL NFR BLD AUTO: 1 % (ref 0–6)
ERYTHROCYTE [DISTWIDTH] IN BLOOD BY AUTOMATED COUNT: 23.1 % (ref 11.6–15.1)
GFR SERPL CREATININE-BSD FRML MDRD: 81 ML/MIN/1.73SQ M
GLUCOSE SERPL-MCNC: 115 MG/DL (ref 65–140)
HCT VFR BLD AUTO: 35 % (ref 34.8–46.1)
HGB BLD-MCNC: 11.1 G/DL (ref 11.5–15.4)
IMM GRANULOCYTES # BLD AUTO: 0.02 THOUSAND/UL (ref 0–0.2)
IMM GRANULOCYTES NFR BLD AUTO: 0 % (ref 0–2)
LYMPHOCYTES # BLD AUTO: 2.06 THOUSANDS/ΜL (ref 0.6–4.47)
LYMPHOCYTES NFR BLD AUTO: 27 % (ref 14–44)
MAGNESIUM SERPL-MCNC: 1.9 MG/DL (ref 1.6–2.6)
MCH RBC QN AUTO: 27.7 PG (ref 26.8–34.3)
MCHC RBC AUTO-ENTMCNC: 31.7 G/DL (ref 31.4–37.4)
MCV RBC AUTO: 87 FL (ref 82–98)
MONOCYTES # BLD AUTO: 0.77 THOUSAND/ΜL (ref 0.17–1.22)
MONOCYTES NFR BLD AUTO: 10 % (ref 4–12)
NEUTROPHILS # BLD AUTO: 4.77 THOUSANDS/ΜL (ref 1.85–7.62)
NEUTS SEG NFR BLD AUTO: 61 % (ref 43–75)
NRBC BLD AUTO-RTO: 0 /100 WBCS
PLATELET # BLD AUTO: 214 THOUSANDS/UL (ref 149–390)
PMV BLD AUTO: 9.1 FL (ref 8.9–12.7)
POTASSIUM SERPL-SCNC: 3.9 MMOL/L (ref 3.5–5.3)
PROT SERPL-MCNC: 7 G/DL (ref 6.4–8.2)
RBC # BLD AUTO: 4.01 MILLION/UL (ref 3.81–5.12)
SODIUM SERPL-SCNC: 140 MMOL/L (ref 136–145)
WBC # BLD AUTO: 7.71 THOUSAND/UL (ref 4.31–10.16)

## 2018-11-07 PROCEDURE — 83735 ASSAY OF MAGNESIUM: CPT | Performed by: OBSTETRICS & GYNECOLOGY

## 2018-11-07 PROCEDURE — 80053 COMPREHEN METABOLIC PANEL: CPT | Performed by: OBSTETRICS & GYNECOLOGY

## 2018-11-07 PROCEDURE — 85025 COMPLETE CBC W/AUTO DIFF WBC: CPT | Performed by: OBSTETRICS & GYNECOLOGY

## 2018-11-07 RX ADMIN — Medication 300 UNITS: at 10:59

## 2018-11-08 ENCOUNTER — OFFICE VISIT (OUTPATIENT)
Dept: GYNECOLOGIC ONCOLOGY | Facility: CLINIC | Age: 78
End: 2018-11-08
Payer: MEDICARE

## 2018-11-08 VITALS
SYSTOLIC BLOOD PRESSURE: 138 MMHG | RESPIRATION RATE: 16 BRPM | TEMPERATURE: 97.8 F | BODY MASS INDEX: 31.34 KG/M2 | WEIGHT: 166 LBS | HEIGHT: 61 IN | HEART RATE: 88 BPM | DIASTOLIC BLOOD PRESSURE: 78 MMHG

## 2018-11-08 DIAGNOSIS — C57.9 GYNECOLOGIC CANCER (HCC): Primary | ICD-10-CM

## 2018-11-08 DIAGNOSIS — N83.8 OVARIAN MASS, LEFT: ICD-10-CM

## 2018-11-08 DIAGNOSIS — I82.4Z2 DVT, LOWER EXTREMITY, DISTAL, ACUTE, LEFT (HCC): ICD-10-CM

## 2018-11-08 PROCEDURE — 99215 OFFICE O/P EST HI 40 MIN: CPT | Performed by: OBSTETRICS & GYNECOLOGY

## 2018-11-08 RX ORDER — GABAPENTIN 100 MG/1
100 CAPSULE ORAL ONCE
Status: CANCELLED | OUTPATIENT
Start: 2018-11-08 | End: 2018-11-08

## 2018-11-08 RX ORDER — ACETAMINOPHEN 325 MG/1
975 TABLET ORAL ONCE
Status: CANCELLED | OUTPATIENT
Start: 2018-11-08 | End: 2018-11-08

## 2018-11-08 RX ORDER — SODIUM CHLORIDE, SODIUM LACTATE, POTASSIUM CHLORIDE, CALCIUM CHLORIDE 600; 310; 30; 20 MG/100ML; MG/100ML; MG/100ML; MG/100ML
125 INJECTION, SOLUTION INTRAVENOUS CONTINUOUS
Status: CANCELLED | OUTPATIENT
Start: 2018-11-08

## 2018-11-08 RX ORDER — HEPARIN SODIUM 5000 [USP'U]/ML
5000 INJECTION, SOLUTION INTRAVENOUS; SUBCUTANEOUS
Status: CANCELLED | OUTPATIENT
Start: 2018-11-08 | End: 2018-11-09

## 2018-11-08 RX ORDER — METRONIDAZOLE 500 MG/1
500 TABLET ORAL ONCE
Qty: 1 TABLET | Refills: 0 | Status: SHIPPED | OUTPATIENT
Start: 2018-11-08 | End: 2018-11-08

## 2018-11-08 RX ORDER — NEOMYCIN SULFATE 500 MG/1
1000 TABLET ORAL 3 TIMES DAILY
Qty: 6 TABLET | Refills: 0 | Status: SHIPPED | OUTPATIENT
Start: 2018-11-08 | End: 2018-11-09

## 2018-11-08 NOTE — LETTER
November 9, 2018     [facility], [address] [place] [address]    Patient: Prema Capps   YOB: 1940   Date of Visit: 11/8/2018       Dear Dr Rodrigo Morton: Thank you for referring Prema Capps to me for evaluation  Below are the relevant portions of my H&P  If you have questions, please do not hesitate to call me  I look forward to following Sang Bonilla along with you  Sincerely,        Dang Moctezuma MD        CC: MD Dang Holliday MD  11/9/2018  8:56 AM  Signed  Assessment/Plan:    Problem List Items Addressed This Visit        Cardiovascular and Mediastinum    DVT, lower extremity, distal, acute, left ([address] )    Relevant Orders    Ambulatory referral to Interventional Radiology    IR IVC permanent filter placement       Genitourinary    Gynecologic cancer [facility]) - Primary     50-year-old with 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 who has responded well to 3 cycles of neoadjuvant chemotherapy with carboplatin and Taxol   has declined to 37 units/ml from a starting value of 1112 units/ml  CT of the chest abdomen and pelvis demonstrates measurable response with resolution of ascites and decrease in size of her left adnexal mass  A 1 1 cm lung nodule in the right lower lobe is unchanged and was not evident on PET scan  She has a DVT and is currently receiving therapeutic anticoagulation with Xarelto  Genetic testing was negative for BRCA mutation  Her performance status is 0   1  I discussed the risks and benefits of diagnostic laparoscopy with possible conversion to exploratory laparotomy, total abdominal hysterectomy, bilateral salpingo-oophorectomy with tumor debulking as indicated, possible intraperitoneal port placement  She understands the risks and benefits of the operation and agrees to proceed as outlined    Consent was obtained by me in the office for surgery as well as for potential blood transfusion  2  Preoperative insertion of removable IVC filter  She was counseled regarding stopping therapeutic anticoagulation preoperatively and regarding postoperative transition from IV therapeutic anticoagulation to oral   3  Postoperatively, the plan will be to continue with chemotherapy  If an intraperitoneal port is able to be inserted, the plan will be to receive 4 cycles of chemo post surgery  Relevant Medications    neomycin (MYCIFRADIN) 500 mg tablet    Other Relevant Orders    Case request operating room: HYSTERECTOMY TOTAL ABDOMINAL (JUJU), BSO, debulking, LAPAROSCOPY DIAGNOSTIC (Completed)    HEMOGLOBIN A1C W/ EAG ESTIMATION    Type and screen    APTT    Protime-INR    EKG 12 lead    Provide patient education on bowel prep instructions       Other    Ovarian mass, left    Relevant Orders    Case request operating room: HYSTERECTOMY TOTAL ABDOMINAL (JUJU), BSO, debulking, LAPAROSCOPY DIAGNOSTIC (Completed)              CHIEF COMPLAINT:  Treatment discussion        Previous therapy:     Gynecologic cancer ([facility] [address] )    8/16/2018 Biopsy     Paracentesis, metastatic adenocarcinoma, consistent with gyn primary          Chemotherapy     Neoadjuvant chemotherapy with taxol 80 mg/m2 weekly and carboplatin AUC 6 every 21 days  She received 2 cycles and is scheduled for cycle 3  Patient ID: Bautista Galeas is a 77 y o  female  79-year-old presents after 3 cycles of neoadjuvant chemotherapy for 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  Initially, she had extensive large volume ascites and a 4 cm adnexal mass on the left  A 1 1 cm pulmonary lesion was also identified and was found to not have any uptake by PET scan  She has tolerated her chemotherapy very well without neuropathy  DVT diagnosed at presentation has been treated with therapeutic Xarelto  She currently has no complaints  No pelvic pain or vaginal bleeding    No interval change in her medications or medical history since her last visit  Her quality of life is good  Review of Systems   Constitutional: Negative for activity change and unexpected weight change  HENT: Negative  Eyes: Negative  Respiratory: Negative  Cardiovascular: Negative  Gastrointestinal: Negative for abdominal distention and abdominal pain  Endocrine: Negative  Genitourinary: Negative for pelvic pain and vaginal bleeding  Musculoskeletal: Negative  Skin: Negative  Allergic/Immunologic: Negative  Neurological: Negative  Hematological: Negative  Psychiatric/Behavioral: Negative  Current Outpatient Prescriptions   Medication Sig Dispense Refill    rivaroxaban (XARELTO) 20 mg tablet Take 1 tablet (20 mg total) by mouth daily with breakfast 30 tablet 3    bisacodyl (DULCOLAX) 5 mg EC tablet Take 4 tablets (20 mg total) by mouth once for 1 dose Take 4 tablets with 8 oz water at 2 PM day before procedure 4 tablet 0    enoxaparin (LOVENOX) 40 mg/0 4 mL Inject 40 mg under the skin 2 (two) times a day      LORazepam (ATIVAN) 1 mg tablet Take 0 5 mg by mouth every 6 (six) hours as needed for anxiety (nausea)      Methylprednisolone 4 MG TBPK Use as directed on package (Patient not taking: Reported on 11/8/2018 ) 21 tablet 0    neomycin (MYCIFRADIN) 500 mg tablet Take 2 tablets (1,000 mg total) by mouth 3 (three) times a day for 3 doses Take at 1 PM, 4 PM, and 9 PM the day before procedure  6 tablet 0    ondansetron (ZOFRAN) 8 mg tablet Take 1 tablet (8 mg total) by mouth every 8 (eight) hours as needed for nausea or vomiting (Patient not taking: Reported on 11/8/2018 ) 30 tablet 1     No current facility-administered medications for this visit          No Known Allergies    Past Medical History:   Diagnosis Date    DVT (deep venous thrombosis) (HCC)     Ovarian mass, left        Past Surgical History:   Procedure Laterality Date    ABDOMINAL SURGERY      APPENDECTOMY      CLAVICLE SURGERY      FOOT SURGERY      IR PARACENTESIS  2018    IR PARACENTESIS  2018    IR PORT PLACEMENT  2018       OB History      Para Term  AB Living    0 0 0 0 0 0    SAB TAB Ectopic Multiple Live Births    0 0 0 0 0          Family History   Problem Relation Age of Onset    Heart disease Father     Cancer Brother        The following portions of the patient's history were reviewed and updated as appropriate: allergies, current medications, past family history, past medical history, past social history, past surgical history and problem list       Objective:    Blood pressure 138/78, pulse 88, temperature 97 8 °F (36 6 °C), temperature source Oral, resp  rate 16, height 5' 1" (1 549 m), weight 75 3 kg (166 lb)  Body mass index is 31 37 kg/m²  Physical Exam   Constitutional: She is oriented to person, place, and time  She appears well-developed and well-nourished  No distress  HENT:   Head: Normocephalic and atraumatic  Eyes: Right eye exhibits no discharge  Left eye exhibits no discharge  No scleral icterus  Neck: Normal range of motion  Neck supple  No thyromegaly present  Cardiovascular: Normal rate, regular rhythm and normal heart sounds  Pulmonary/Chest: Effort normal and breath sounds normal    Abdominal: Soft  She exhibits no distension and no mass  There is no tenderness  There is no rebound and no guarding  Genitourinary:   Genitourinary Comments: The external female genitalia is normal  The bartholin's, uretheral and skenes glands are normal  The urethral meatus is normal (midline with no lesions)  Anus without fissure or lesion  Speculum exam reveals vagina without lesion or discharge  Cervix has a small lesion present at the office    No bleeding  No significant cystocele or rectocele noted  Bimanual exam notes a uterus with normal contour, mobility  No tenderness  Adnexa without masses or tenderness   Bladder is without fullness, mass or tenderness  Her previous exam demonstrated a fixed pelvis  Musculoskeletal: She exhibits no edema  Lymphadenopathy:     She has no cervical adenopathy  Neurological: She is alert and oriented to person, place, and time  Skin: Skin is warm and dry  She is not diaphoretic  Psychiatric: She has a normal mood and affect   Her behavior is normal  Judgment and thought content normal          Lab Results   Component Value Date     37 1 (H) 10/31/2018     Lab Results   Component Value Date    WBC 7 71 11/07/2018    HGB 11 1 (L) 11/07/2018    HCT 35 0 11/07/2018    MCV 87 11/07/2018     11/07/2018     Lab Results   Component Value Date    K 3 9 11/07/2018     11/07/2018    CO2 27 11/07/2018    BUN 17 11/07/2018    CREATININE 0 71 11/07/2018    CALCIUM 9 0 11/07/2018    AST 15 11/07/2018    ALT 17 11/07/2018    ALKPHOS 120 (H) 11/07/2018    EGFR 81 11/07/2018

## 2018-11-08 NOTE — PATIENT INSTRUCTIONS
1  Nothing to eat or drink after midnight prior to the operation  You are allowed clear liquids until 3 hours prior to the scheduled start time of surgery  No milk products or solid food for 8 hours prior to surgery  2   Please avoid ibuprofen, aspirin, fish oil for 7 days prior to surgery  3  Do not take any of your medications the day of surgery  4    Stop Xarelto 4 days prior to surgery

## 2018-11-09 NOTE — H&P
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 who has responded well to 3 cycles of neoadjuvant chemotherapy with carboplatin and Taxol   has declined to 37 units/ml from a starting value of 1112 units/ml  CT of the chest abdomen and pelvis demonstrates measurable response with resolution of ascites and decrease in size of her left adnexal mass  A 1 1 cm lung nodule in the right lower lobe is unchanged and was not evident on PET scan  She has a DVT and is currently receiving therapeutic anticoagulation with Xarelto  Genetic testing was negative for BRCA mutation  Her performance status is 0   1  I discussed the risks and benefits of diagnostic laparoscopy with possible conversion to exploratory laparotomy, total abdominal hysterectomy, bilateral salpingo-oophorectomy with tumor debulking as indicated, possible intraperitoneal port placement  She understands the risks and benefits of the operation and agrees to proceed as outlined  Consent was obtained by me in the office for surgery as well as for potential blood transfusion  2  Preoperative insertion of removable IVC filter  She was counseled regarding stopping therapeutic anticoagulation preoperatively and regarding postoperative transition from IV therapeutic anticoagulation to oral   3  Postoperatively, the plan will be to continue with chemotherapy  If an intraperitoneal port is able to be inserted, the plan will be to receive 4 cycles of chemo post surgery             Relevant Medications    neomycin (MYCIFRADIN) 500 mg tablet    Other Relevant Orders    Case request operating room: HYSTERECTOMY TOTAL ABDOMINAL (JUJU), BSO, debulking, LAPAROSCOPY DIAGNOSTIC (Completed)    HEMOGLOBIN A1C W/ EAG ESTIMATION    Type and screen    APTT    Protime-INR    EKG 12 lead    Provide patient education on bowel prep instructions       Other    Ovarian mass, left    Relevant Orders    Case request operating room: HYSTERECTOMY TOTAL ABDOMINAL (JUJU), BSO, debulking, LAPAROSCOPY DIAGNOSTIC (Completed)              CHIEF COMPLAINT:  Treatment discussion        Previous therapy:     Gynecologic cancer ([facility] [address] )    8/16/2018 Biopsy     Paracentesis, metastatic adenocarcinoma, consistent with gyn primary          Chemotherapy     Neoadjuvant chemotherapy with taxol 80 mg/m2 weekly and carboplatin AUC 6 every 21 days  She received 2 cycles and is scheduled for cycle 3  Patient ID: Kayla Yee is a 77 y o  female  76-year-old presents after 3 cycles of neoadjuvant chemotherapy for 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  Initially, she had extensive large volume ascites and a 4 cm adnexal mass on the left  A 1 1 cm pulmonary lesion was also identified and was found to not have any uptake by PET scan  She has tolerated her chemotherapy very well without neuropathy  DVT diagnosed at presentation has been treated with therapeutic Xarelto  She currently has no complaints  No pelvic pain or vaginal bleeding  No interval change in her medications or medical history since her last visit  Her quality of life is good  Review of Systems   Constitutional: Negative for activity change and unexpected weight change  HENT: Negative  Eyes: Negative  Respiratory: Negative  Cardiovascular: Negative  Gastrointestinal: Negative for abdominal distention and abdominal pain  Endocrine: Negative  Genitourinary: Negative for pelvic pain and vaginal bleeding  Musculoskeletal: Negative  Skin: Negative  Allergic/Immunologic: Negative  Neurological: Negative  Hematological: Negative  Psychiatric/Behavioral: Negative  Current Outpatient Prescriptions   Medication Sig Dispense Refill    rivaroxaban (XARELTO) 20 mg tablet Take 1 tablet (20 mg total) by mouth daily with breakfast 30 tablet 3    bisacodyl (DULCOLAX) 5 mg EC tablet Take 4 tablets (20 mg total) by mouth once for 1 dose Take 4 tablets with 8 oz water at 2 PM day before procedure 4 tablet 0    enoxaparin (LOVENOX) 40 mg/0 4 mL Inject 40 mg under the skin 2 (two) times a day      LORazepam (ATIVAN) 1 mg tablet Take 0 5 mg by mouth every 6 (six) hours as needed for anxiety (nausea)      Methylprednisolone 4 MG TBPK Use as directed on package (Patient not taking: Reported on 2018 ) 21 tablet 0    neomycin (MYCIFRADIN) 500 mg tablet Take 2 tablets (1,000 mg total) by mouth 3 (three) times a day for 3 doses Take at 1 PM, 4 PM, and 9 PM the day before procedure  6 tablet 0    ondansetron (ZOFRAN) 8 mg tablet Take 1 tablet (8 mg total) by mouth every 8 (eight) hours as needed for nausea or vomiting (Patient not taking: Reported on 2018 ) 30 tablet 1     No current facility-administered medications for this visit          No Known Allergies    Past Medical History:   Diagnosis Date    DVT (deep venous thrombosis) (HCC)     Ovarian mass, left        Past Surgical History:   Procedure Laterality Date    ABDOMINAL SURGERY      APPENDECTOMY      CLAVICLE SURGERY      FOOT SURGERY      IR PARACENTESIS  2018    IR PARACENTESIS  2018    IR PORT PLACEMENT  2018       OB History      Para Term  AB Living    0 0 0 0 0 0    SAB TAB Ectopic Multiple Live Births    0 0 0 0 0          Family History   Problem Relation Age of Onset    Heart disease Father     Cancer Brother        The following portions of the patient's history were reviewed and updated as appropriate: allergies, current medications, past family history, past medical history, past social history, past surgical history and problem list       Objective:    Blood pressure 138/78, pulse 88, temperature 97 8 °F (36 6 °C), temperature source Oral, resp  rate 16, height 5' 1" (1 549 m), weight 75 3 kg (166 lb)  Body mass index is 31 37 kg/m²  Physical Exam   Constitutional: She is oriented to person, place, and time  She appears well-developed and well-nourished  No distress  HENT:   Head: Normocephalic and atraumatic  Eyes: Right eye exhibits no discharge  Left eye exhibits no discharge  No scleral icterus  Neck: Normal range of motion  Neck supple  No thyromegaly present  Cardiovascular: Normal rate, regular rhythm and normal heart sounds  Pulmonary/Chest: Effort normal and breath sounds normal    Abdominal: Soft  She exhibits no distension and no mass  There is no tenderness  There is no rebound and no guarding  Genitourinary:   Genitourinary Comments: The external female genitalia is normal  The bartholin's, uretheral and skenes glands are normal  The urethral meatus is normal (midline with no lesions)  Anus without fissure or lesion  Speculum exam reveals vagina without lesion or discharge  Cervix has a small lesion present at the office    No bleeding  No significant cystocele or rectocele noted  Bimanual exam notes a uterus with normal contour, mobility  No tenderness  Adnexa without masses or tenderness  Bladder is without fullness, mass or tenderness  Her previous exam demonstrated a fixed pelvis  Musculoskeletal: She exhibits no edema  Lymphadenopathy:     She has no cervical adenopathy  Neurological: She is alert and oriented to person, place, and time  Skin: Skin is warm and dry  She is not diaphoretic  Psychiatric: She has a normal mood and affect   Her behavior is normal  Judgment and thought content normal          Lab Results   Component Value Date     37 1 (H) 10/31/2018     Lab Results   Component Value Date    WBC 7 71 11/07/2018    HGB 11 1 (L) 11/07/2018    HCT 35 0 11/07/2018    MCV 87 11/07/2018     11/07/2018     Lab Results   Component Value Date    K 3 9 11/07/2018     11/07/2018    CO2 27 11/07/2018    BUN 17 11/07/2018    CREATININE 0 71 11/07/2018    CALCIUM 9 0 11/07/2018    AST 15 11/07/2018    ALT 17 11/07/2018    ALKPHOS 120 (H) 11/07/2018    EGFR 81 11/07/2018

## 2018-11-13 ENCOUNTER — ANESTHESIA EVENT (OUTPATIENT)
Dept: PERIOP | Facility: HOSPITAL | Age: 78
DRG: 737 | End: 2018-11-13
Payer: MEDICARE

## 2018-11-14 ENCOUNTER — TRANSCRIBE ORDERS (OUTPATIENT)
Dept: ADMINISTRATIVE | Facility: HOSPITAL | Age: 78
End: 2018-11-14

## 2018-11-14 ENCOUNTER — HOSPITAL ENCOUNTER (OUTPATIENT)
Dept: INFUSION CENTER | Facility: HOSPITAL | Age: 78
Discharge: HOME | End: 2018-11-14
Payer: MEDICARE

## 2018-11-14 ENCOUNTER — OFFICE VISIT (OUTPATIENT)
Dept: LAB | Facility: HOSPITAL | Age: 78
End: 2018-11-14
Attending: OBSTETRICS & GYNECOLOGY
Payer: MEDICARE

## 2018-11-14 DIAGNOSIS — C57.9 GYNECOLOGIC CANCER (HCC): ICD-10-CM

## 2018-11-14 LAB
ABO GROUP BLD: NORMAL
ALBUMIN SERPL BCP-MCNC: 3.1 G/DL (ref 3.5–5)
ALP SERPL-CCNC: 126 U/L (ref 46–116)
ALT SERPL W P-5'-P-CCNC: 16 U/L (ref 12–78)
ANION GAP SERPL CALCULATED.3IONS-SCNC: 9 MMOL/L (ref 4–13)
APTT PPP: 37 SECONDS (ref 26–38)
AST SERPL W P-5'-P-CCNC: 15 U/L (ref 5–45)
BASOPHILS # BLD AUTO: 0.05 THOUSANDS/ΜL (ref 0–0.1)
BASOPHILS NFR BLD AUTO: 1 % (ref 0–1)
BILIRUB SERPL-MCNC: 0.2 MG/DL (ref 0.2–1)
BLD GP AB SCN SERPL QL: NEGATIVE
BUN SERPL-MCNC: 21 MG/DL (ref 5–25)
CALCIUM SERPL-MCNC: 8.7 MG/DL (ref 8.3–10.1)
CHLORIDE SERPL-SCNC: 104 MMOL/L (ref 100–108)
CO2 SERPL-SCNC: 27 MMOL/L (ref 21–32)
CREAT SERPL-MCNC: 0.76 MG/DL (ref 0.6–1.3)
EOSINOPHIL # BLD AUTO: 0.12 THOUSAND/ΜL (ref 0–0.61)
EOSINOPHIL NFR BLD AUTO: 1 % (ref 0–6)
ERYTHROCYTE [DISTWIDTH] IN BLOOD BY AUTOMATED COUNT: 22 % (ref 11.6–15.1)
EST. AVERAGE GLUCOSE BLD GHB EST-MCNC: 94 MG/DL
GFR SERPL CREATININE-BSD FRML MDRD: 75 ML/MIN/1.73SQ M
GLUCOSE SERPL-MCNC: 111 MG/DL (ref 65–140)
HBA1C MFR BLD: 4.9 % (ref 4.2–6.3)
HCT VFR BLD AUTO: 35.3 % (ref 34.8–46.1)
HGB BLD-MCNC: 11 G/DL (ref 11.5–15.4)
IMM GRANULOCYTES # BLD AUTO: 0.03 THOUSAND/UL (ref 0–0.2)
IMM GRANULOCYTES NFR BLD AUTO: 0 % (ref 0–2)
INR PPP: 1.27 (ref 0.86–1.16)
LYMPHOCYTES # BLD AUTO: 2.15 THOUSANDS/ΜL (ref 0.6–4.47)
LYMPHOCYTES NFR BLD AUTO: 26 % (ref 14–44)
MAGNESIUM SERPL-MCNC: 1.9 MG/DL (ref 1.6–2.6)
MCH RBC QN AUTO: 27.6 PG (ref 26.8–34.3)
MCHC RBC AUTO-ENTMCNC: 31.2 G/DL (ref 31.4–37.4)
MCV RBC AUTO: 89 FL (ref 82–98)
MONOCYTES # BLD AUTO: 0.78 THOUSAND/ΜL (ref 0.17–1.22)
MONOCYTES NFR BLD AUTO: 9 % (ref 4–12)
NEUTROPHILS # BLD AUTO: 5.15 THOUSANDS/ΜL (ref 1.85–7.62)
NEUTS SEG NFR BLD AUTO: 63 % (ref 43–75)
NRBC BLD AUTO-RTO: 0 /100 WBCS
PLATELET # BLD AUTO: 343 THOUSANDS/UL (ref 149–390)
PMV BLD AUTO: 8.7 FL (ref 8.9–12.7)
POTASSIUM SERPL-SCNC: 3.6 MMOL/L (ref 3.5–5.3)
PROT SERPL-MCNC: 6.8 G/DL (ref 6.4–8.2)
PROTHROMBIN TIME: 13.2 SECONDS (ref 9.4–11.7)
RBC # BLD AUTO: 3.99 MILLION/UL (ref 3.81–5.12)
RH BLD: POSITIVE
SODIUM SERPL-SCNC: 140 MMOL/L (ref 136–145)
SPECIMEN EXPIRATION DATE: NORMAL
WBC # BLD AUTO: 8.28 THOUSAND/UL (ref 4.31–10.16)

## 2018-11-14 PROCEDURE — 86850 RBC ANTIBODY SCREEN: CPT

## 2018-11-14 PROCEDURE — 83036 HEMOGLOBIN GLYCOSYLATED A1C: CPT

## 2018-11-14 PROCEDURE — 93005 ELECTROCARDIOGRAM TRACING: CPT

## 2018-11-14 PROCEDURE — 83735 ASSAY OF MAGNESIUM: CPT | Performed by: OBSTETRICS & GYNECOLOGY

## 2018-11-14 PROCEDURE — 85730 THROMBOPLASTIN TIME PARTIAL: CPT

## 2018-11-14 PROCEDURE — 85025 COMPLETE CBC W/AUTO DIFF WBC: CPT | Performed by: OBSTETRICS & GYNECOLOGY

## 2018-11-14 PROCEDURE — 85610 PROTHROMBIN TIME: CPT

## 2018-11-14 PROCEDURE — 86900 BLOOD TYPING SEROLOGIC ABO: CPT

## 2018-11-14 PROCEDURE — 80053 COMPREHEN METABOLIC PANEL: CPT | Performed by: OBSTETRICS & GYNECOLOGY

## 2018-11-14 PROCEDURE — 86901 BLOOD TYPING SEROLOGIC RH(D): CPT

## 2018-11-14 RX ADMIN — Medication 300 UNITS: at 11:00

## 2018-11-15 NOTE — PRE-PROCEDURE INSTRUCTIONS
Pre-Surgery Instructions:   Medication Instructions    rivaroxaban (XARELTO) 20 mg tablet Patient was instructed by Physician and understands

## 2018-11-16 LAB
ATRIAL RATE: 84 BPM
P AXIS: 21 DEGREES
PR INTERVAL: 168 MS
QRS AXIS: -9 DEGREES
QRSD INTERVAL: 90 MS
QT INTERVAL: 352 MS
QTC INTERVAL: 415 MS
T WAVE AXIS: 19 DEGREES
VENTRICULAR RATE: 84 BPM

## 2018-11-16 PROCEDURE — 93010 ELECTROCARDIOGRAM REPORT: CPT | Performed by: INTERNAL MEDICINE

## 2018-11-19 ENCOUNTER — TELEPHONE (OUTPATIENT)
Dept: RADIOLOGY | Facility: HOSPITAL | Age: 78
End: 2018-11-19

## 2018-11-19 RX ORDER — SODIUM CHLORIDE 9 MG/ML
75 INJECTION, SOLUTION INTRAVENOUS CONTINUOUS
Status: CANCELLED | OUTPATIENT
Start: 2018-11-19

## 2018-11-21 ENCOUNTER — TELEPHONE (OUTPATIENT)
Dept: INPATIENT UNIT | Facility: HOSPITAL | Age: 78
End: 2018-11-21

## 2018-11-23 ENCOUNTER — HOSPITAL ENCOUNTER (INPATIENT)
Facility: HOSPITAL | Age: 78
LOS: 5 days | Discharge: HOME | DRG: 737 | End: 2018-11-28
Attending: OBSTETRICS & GYNECOLOGY | Admitting: OBSTETRICS & GYNECOLOGY
Payer: MEDICARE

## 2018-11-23 ENCOUNTER — ANESTHESIA (OUTPATIENT)
Dept: PERIOP | Facility: HOSPITAL | Age: 78
DRG: 737 | End: 2018-11-23
Payer: MEDICARE

## 2018-11-23 ENCOUNTER — HOSPITAL ENCOUNTER (OUTPATIENT)
Dept: RADIOLOGY | Facility: HOSPITAL | Age: 78
Discharge: HOME | DRG: 737 | End: 2018-11-23
Attending: OBSTETRICS & GYNECOLOGY | Admitting: RADIOLOGY
Payer: MEDICARE

## 2018-11-23 ENCOUNTER — APPOINTMENT (OUTPATIENT)
Dept: RADIOLOGY | Facility: HOSPITAL | Age: 78
DRG: 737 | End: 2018-11-23
Payer: MEDICARE

## 2018-11-23 DIAGNOSIS — N83.8 OVARIAN MASS, LEFT: ICD-10-CM

## 2018-11-23 DIAGNOSIS — Z90.49 S/P PARTIAL RESECTION OF COLON: ICD-10-CM

## 2018-11-23 DIAGNOSIS — I82.412 DVT OF DEEP FEMORAL VEIN, LEFT (HCC): ICD-10-CM

## 2018-11-23 DIAGNOSIS — C57.9 GYNECOLOGIC CANCER (HCC): Primary | ICD-10-CM

## 2018-11-23 PROBLEM — Z90.710 STATUS POST ABDOMINAL HYSTERECTOMY: Status: ACTIVE | Noted: 2018-11-23

## 2018-11-23 LAB
ABO GROUP BLD: NORMAL
BASOPHILS # BLD AUTO: 0.01 THOUSANDS/ΜL (ref 0–0.1)
BASOPHILS NFR BLD AUTO: 0 % (ref 0–1)
BLD GP AB SCN SERPL QL: NEGATIVE
EOSINOPHIL # BLD AUTO: 0 THOUSAND/ΜL (ref 0–0.61)
EOSINOPHIL NFR BLD AUTO: 0 % (ref 0–6)
ERYTHROCYTE [DISTWIDTH] IN BLOOD BY AUTOMATED COUNT: 20.4 % (ref 11.6–15.1)
GLUCOSE SERPL-MCNC: 155 MG/DL (ref 65–140)
HCT VFR BLD AUTO: 34.3 % (ref 34.8–46.1)
HGB BLD-MCNC: 10.7 G/DL (ref 11.5–15.4)
IMM GRANULOCYTES # BLD AUTO: 0.07 THOUSAND/UL (ref 0–0.2)
IMM GRANULOCYTES NFR BLD AUTO: 1 % (ref 0–2)
LYMPHOCYTES # BLD AUTO: 0.45 THOUSANDS/ΜL (ref 0.6–4.47)
LYMPHOCYTES NFR BLD AUTO: 3 % (ref 14–44)
MCH RBC QN AUTO: 28 PG (ref 26.8–34.3)
MCHC RBC AUTO-ENTMCNC: 31.2 G/DL (ref 31.4–37.4)
MCV RBC AUTO: 90 FL (ref 82–98)
MONOCYTES # BLD AUTO: 1.16 THOUSAND/ΜL (ref 0.17–1.22)
MONOCYTES NFR BLD AUTO: 8 % (ref 4–12)
NEUTROPHILS # BLD AUTO: 13.45 THOUSANDS/ΜL (ref 1.85–7.62)
NEUTS SEG NFR BLD AUTO: 88 % (ref 43–75)
NRBC BLD AUTO-RTO: 0 /100 WBCS
PLATELET # BLD AUTO: 197 THOUSANDS/UL (ref 149–390)
PMV BLD AUTO: 9.2 FL (ref 8.9–12.7)
RBC # BLD AUTO: 3.82 MILLION/UL (ref 3.81–5.12)
RH BLD: POSITIVE
SPECIMEN EXPIRATION DATE: NORMAL
WBC # BLD AUTO: 15.14 THOUSAND/UL (ref 4.31–10.16)

## 2018-11-23 PROCEDURE — 88307 TISSUE EXAM BY PATHOLOGIST: CPT | Performed by: PATHOLOGY

## 2018-11-23 PROCEDURE — 44140 PARTIAL REMOVAL OF COLON: CPT | Performed by: OBSTETRICS & GYNECOLOGY

## 2018-11-23 PROCEDURE — 88304 TISSUE EXAM BY PATHOLOGIST: CPT | Performed by: PATHOLOGY

## 2018-11-23 PROCEDURE — 88344 IMHCHEM/IMCYTCHM EA MLT ANTB: CPT | Performed by: PATHOLOGY

## 2018-11-23 PROCEDURE — 88305 TISSUE EXAM BY PATHOLOGIST: CPT | Performed by: PATHOLOGY

## 2018-11-23 PROCEDURE — 88342 IMHCHEM/IMCYTCHM 1ST ANTB: CPT | Performed by: PATHOLOGY

## 2018-11-23 PROCEDURE — 0TJB8ZZ INSPECTION OF BLADDER, VIA NATURAL OR ARTIFICIAL OPENING ENDOSCOPIC: ICD-10-PCS | Performed by: OBSTETRICS & GYNECOLOGY

## 2018-11-23 PROCEDURE — 88341 IMHCHEM/IMCYTCHM EA ADD ANTB: CPT | Performed by: PATHOLOGY

## 2018-11-23 PROCEDURE — 85025 COMPLETE CBC W/AUTO DIFF WBC: CPT | Performed by: OBSTETRICS & GYNECOLOGY

## 2018-11-23 PROCEDURE — 0UT90ZZ RESECTION OF UTERUS, OPEN APPROACH: ICD-10-PCS | Performed by: OBSTETRICS & GYNECOLOGY

## 2018-11-23 PROCEDURE — 86920 COMPATIBILITY TEST SPIN: CPT

## 2018-11-23 PROCEDURE — 88309 TISSUE EXAM BY PATHOLOGIST: CPT | Performed by: PATHOLOGY

## 2018-11-23 PROCEDURE — 0DTN0ZZ RESECTION OF SIGMOID COLON, OPEN APPROACH: ICD-10-PCS | Performed by: OBSTETRICS & GYNECOLOGY

## 2018-11-23 PROCEDURE — 88363 XM ARCHIVE TISSUE MOLEC ANAL: CPT | Performed by: PATHOLOGY

## 2018-11-23 PROCEDURE — 44139 MOBILIZATION OF COLON: CPT | Performed by: OBSTETRICS & GYNECOLOGY

## 2018-11-23 PROCEDURE — C1880 VENA CAVA FILTER: HCPCS

## 2018-11-23 PROCEDURE — 06H03DZ INSERTION OF INTRALUMINAL DEVICE INTO INFERIOR VENA CAVA, PERCUTANEOUS APPROACH: ICD-10-PCS | Performed by: RADIOLOGY

## 2018-11-23 PROCEDURE — 37191 INS ENDOVAS VENA CAVA FILTR: CPT | Performed by: RADIOLOGY

## 2018-11-23 PROCEDURE — 86900 BLOOD TYPING SEROLOGIC ABO: CPT | Performed by: OBSTETRICS & GYNECOLOGY

## 2018-11-23 PROCEDURE — 86850 RBC ANTIBODY SCREEN: CPT | Performed by: OBSTETRICS & GYNECOLOGY

## 2018-11-23 PROCEDURE — 0UT20ZZ RESECTION OF BILATERAL OVARIES, OPEN APPROACH: ICD-10-PCS | Performed by: OBSTETRICS & GYNECOLOGY

## 2018-11-23 PROCEDURE — 82948 REAGENT STRIP/BLOOD GLUCOSE: CPT

## 2018-11-23 PROCEDURE — 0UT70ZZ RESECTION OF BILATERAL FALLOPIAN TUBES, OPEN APPROACH: ICD-10-PCS | Performed by: OBSTETRICS & GYNECOLOGY

## 2018-11-23 PROCEDURE — 86901 BLOOD TYPING SEROLOGIC RH(D): CPT | Performed by: OBSTETRICS & GYNECOLOGY

## 2018-11-23 PROCEDURE — 58956 BSO OMENTECTOMY W/TAH: CPT | Performed by: OBSTETRICS & GYNECOLOGY

## 2018-11-23 PROCEDURE — 37191 INS ENDOVAS VENA CAVA FILTR: CPT

## 2018-11-23 PROCEDURE — C1894 INTRO/SHEATH, NON-LASER: HCPCS

## 2018-11-23 PROCEDURE — 0DBU0ZZ EXCISION OF OMENTUM, OPEN APPROACH: ICD-10-PCS | Performed by: OBSTETRICS & GYNECOLOGY

## 2018-11-23 RX ORDER — GLYCOPYRROLATE 0.2 MG/ML
INJECTION INTRAMUSCULAR; INTRAVENOUS AS NEEDED
Status: DISCONTINUED | OUTPATIENT
Start: 2018-11-23 | End: 2018-11-23 | Stop reason: SURG

## 2018-11-23 RX ORDER — ONDANSETRON 2 MG/ML
INJECTION INTRAMUSCULAR; INTRAVENOUS AS NEEDED
Status: DISCONTINUED | OUTPATIENT
Start: 2018-11-23 | End: 2018-11-23 | Stop reason: SURG

## 2018-11-23 RX ORDER — HYDROMORPHONE HCL/PF 1 MG/ML
0.4 SYRINGE (ML) INJECTION
Status: DISCONTINUED | OUTPATIENT
Start: 2018-11-23 | End: 2018-11-23 | Stop reason: HOSPADM

## 2018-11-23 RX ORDER — CEFAZOLIN SODIUM 1 G/50ML
1000 SOLUTION INTRAVENOUS ONCE
Status: COMPLETED | OUTPATIENT
Start: 2018-11-23 | End: 2018-11-23

## 2018-11-23 RX ORDER — FENTANYL CITRATE 50 UG/ML
INJECTION, SOLUTION INTRAMUSCULAR; INTRAVENOUS AS NEEDED
Status: DISCONTINUED | OUTPATIENT
Start: 2018-11-23 | End: 2018-11-23 | Stop reason: SURG

## 2018-11-23 RX ORDER — SODIUM CHLORIDE, SODIUM LACTATE, POTASSIUM CHLORIDE, CALCIUM CHLORIDE 600; 310; 30; 20 MG/100ML; MG/100ML; MG/100ML; MG/100ML
75 INJECTION, SOLUTION INTRAVENOUS CONTINUOUS
Status: DISCONTINUED | OUTPATIENT
Start: 2018-11-23 | End: 2018-11-27

## 2018-11-23 RX ORDER — SODIUM CHLORIDE 9 MG/ML
75 INJECTION, SOLUTION INTRAVENOUS CONTINUOUS
Status: DISCONTINUED | OUTPATIENT
Start: 2018-11-23 | End: 2018-11-24 | Stop reason: HOSPADM

## 2018-11-23 RX ORDER — EPHEDRINE SULFATE 50 MG/ML
INJECTION, SOLUTION INTRAVENOUS AS NEEDED
Status: DISCONTINUED | OUTPATIENT
Start: 2018-11-23 | End: 2018-11-23 | Stop reason: SURG

## 2018-11-23 RX ORDER — LIDOCAINE HYDROCHLORIDE 10 MG/ML
0.5 INJECTION, SOLUTION INFILTRATION; PERINEURAL ONCE AS NEEDED
Status: DISCONTINUED | OUTPATIENT
Start: 2018-11-23 | End: 2018-11-23 | Stop reason: HOSPADM

## 2018-11-23 RX ORDER — NALOXONE HYDROCHLORIDE 0.4 MG/ML
0.1 INJECTION, SOLUTION INTRAMUSCULAR; INTRAVENOUS; SUBCUTANEOUS
Status: DISCONTINUED | OUTPATIENT
Start: 2018-11-23 | End: 2018-11-23 | Stop reason: HOSPADM

## 2018-11-23 RX ORDER — ONDANSETRON 2 MG/ML
4 INJECTION INTRAMUSCULAR; INTRAVENOUS EVERY 6 HOURS PRN
Status: DISCONTINUED | OUTPATIENT
Start: 2018-11-23 | End: 2018-11-28 | Stop reason: HOSPADM

## 2018-11-23 RX ORDER — BUPIVACAINE HYDROCHLORIDE 2.5 MG/ML
INJECTION, SOLUTION INFILTRATION; PERINEURAL AS NEEDED
Status: DISCONTINUED | OUTPATIENT
Start: 2018-11-23 | End: 2018-11-23 | Stop reason: HOSPADM

## 2018-11-23 RX ORDER — SODIUM CHLORIDE, SODIUM LACTATE, POTASSIUM CHLORIDE, CALCIUM CHLORIDE 600; 310; 30; 20 MG/100ML; MG/100ML; MG/100ML; MG/100ML
50 INJECTION, SOLUTION INTRAVENOUS CONTINUOUS
Status: DISCONTINUED | OUTPATIENT
Start: 2018-11-23 | End: 2018-11-23

## 2018-11-23 RX ORDER — ACETAMINOPHEN 325 MG/1
650 TABLET ORAL EVERY 6 HOURS PRN
Status: DISCONTINUED | OUTPATIENT
Start: 2018-11-23 | End: 2018-11-27

## 2018-11-23 RX ORDER — ONDANSETRON 2 MG/ML
4 INJECTION INTRAMUSCULAR; INTRAVENOUS ONCE AS NEEDED
Status: DISCONTINUED | OUTPATIENT
Start: 2018-11-23 | End: 2018-11-23 | Stop reason: HOSPADM

## 2018-11-23 RX ORDER — ACETAMINOPHEN 325 MG/1
975 TABLET ORAL ONCE
Status: COMPLETED | OUTPATIENT
Start: 2018-11-23 | End: 2018-11-23

## 2018-11-23 RX ORDER — ROCURONIUM BROMIDE 10 MG/ML
INJECTION, SOLUTION INTRAVENOUS AS NEEDED
Status: DISCONTINUED | OUTPATIENT
Start: 2018-11-23 | End: 2018-11-23 | Stop reason: SURG

## 2018-11-23 RX ORDER — HEPARIN SODIUM 5000 [USP'U]/ML
5000 INJECTION, SOLUTION INTRAVENOUS; SUBCUTANEOUS
Status: DISCONTINUED | OUTPATIENT
Start: 2018-11-23 | End: 2018-11-23 | Stop reason: HOSPADM

## 2018-11-23 RX ORDER — MAGNESIUM HYDROXIDE 1200 MG/15ML
LIQUID ORAL AS NEEDED
Status: DISCONTINUED | OUTPATIENT
Start: 2018-11-23 | End: 2018-11-23 | Stop reason: HOSPADM

## 2018-11-23 RX ORDER — FENTANYL CITRATE 50 UG/ML
INJECTION, SOLUTION INTRAMUSCULAR; INTRAVENOUS CODE/TRAUMA/SEDATION MEDICATION
Status: COMPLETED | OUTPATIENT
Start: 2018-11-23 | End: 2018-11-23

## 2018-11-23 RX ORDER — DEXTROSE, SODIUM CHLORIDE, AND POTASSIUM CHLORIDE 5; .45; .15 G/100ML; G/100ML; G/100ML
100 INJECTION INTRAVENOUS CONTINUOUS
Status: DISCONTINUED | OUTPATIENT
Start: 2018-11-23 | End: 2018-11-27

## 2018-11-23 RX ORDER — OXYCODONE HYDROCHLORIDE 5 MG/1
5 TABLET ORAL EVERY 4 HOURS PRN
Qty: 20 TABLET | Refills: 0 | Status: SHIPPED | OUTPATIENT
Start: 2018-11-23 | End: 2018-12-03

## 2018-11-23 RX ORDER — GABAPENTIN 100 MG/1
100 CAPSULE ORAL ONCE
Status: COMPLETED | OUTPATIENT
Start: 2018-11-23 | End: 2018-11-23

## 2018-11-23 RX ORDER — SODIUM CHLORIDE, SODIUM LACTATE, POTASSIUM CHLORIDE, CALCIUM CHLORIDE 600; 310; 30; 20 MG/100ML; MG/100ML; MG/100ML; MG/100ML
125 INJECTION, SOLUTION INTRAVENOUS CONTINUOUS
Status: DISCONTINUED | OUTPATIENT
Start: 2018-11-23 | End: 2018-11-23

## 2018-11-23 RX ORDER — PROPOFOL 10 MG/ML
INJECTION, EMULSION INTRAVENOUS AS NEEDED
Status: DISCONTINUED | OUTPATIENT
Start: 2018-11-23 | End: 2018-11-23 | Stop reason: SURG

## 2018-11-23 RX ORDER — HYDROMORPHONE HYDROCHLORIDE 2 MG/ML
INJECTION, SOLUTION INTRAMUSCULAR; INTRAVENOUS; SUBCUTANEOUS AS NEEDED
Status: DISCONTINUED | OUTPATIENT
Start: 2018-11-23 | End: 2018-11-23 | Stop reason: SURG

## 2018-11-23 RX ORDER — ALBUMIN, HUMAN INJ 5% 5 %
SOLUTION INTRAVENOUS CONTINUOUS PRN
Status: DISCONTINUED | OUTPATIENT
Start: 2018-11-23 | End: 2018-11-23 | Stop reason: SURG

## 2018-11-23 RX ORDER — CEFAZOLIN SODIUM 2 G/50ML
2000 SOLUTION INTRAVENOUS EVERY 8 HOURS
Status: COMPLETED | OUTPATIENT
Start: 2018-11-23 | End: 2018-11-27

## 2018-11-23 RX ORDER — SODIUM CHLORIDE 9 MG/ML
INJECTION, SOLUTION INTRAVENOUS CONTINUOUS PRN
Status: DISCONTINUED | OUTPATIENT
Start: 2018-11-23 | End: 2018-11-23 | Stop reason: SURG

## 2018-11-23 RX ADMIN — LIDOCAINE HYDROCHLORIDE 50 MG: 20 INJECTION, SOLUTION INTRAVENOUS at 09:50

## 2018-11-23 RX ADMIN — PROPOFOL 150 MG: 10 INJECTION, EMULSION INTRAVENOUS at 09:50

## 2018-11-23 RX ADMIN — HYDROMORPHONE HYDROCHLORIDE 0.5 MG: 2 INJECTION, SOLUTION INTRAMUSCULAR; INTRAVENOUS; SUBCUTANEOUS at 10:18

## 2018-11-23 RX ADMIN — CEFAZOLIN SODIUM 1000 MG: 1 SOLUTION INTRAVENOUS at 13:48

## 2018-11-23 RX ADMIN — METRONIDAZOLE 500 MG: 500 INJECTION, SOLUTION INTRAVENOUS at 18:43

## 2018-11-23 RX ADMIN — ROCURONIUM BROMIDE 10 MG: 10 INJECTION INTRAVENOUS at 13:22

## 2018-11-23 RX ADMIN — DEXAMETHASONE SODIUM PHOSPHATE 4 MG: 10 INJECTION INTRAMUSCULAR; INTRAVENOUS at 10:05

## 2018-11-23 RX ADMIN — SODIUM CHLORIDE: 0.9 INJECTION, SOLUTION INTRAVENOUS at 09:56

## 2018-11-23 RX ADMIN — SODIUM CHLORIDE: 9 INJECTION INTRAMUSCULAR; INTRAVENOUS; SUBCUTANEOUS at 16:00

## 2018-11-23 RX ADMIN — SODIUM CHLORIDE, SODIUM LACTATE, POTASSIUM CHLORIDE, AND CALCIUM CHLORIDE 125 ML/HR: .6; .31; .03; .02 INJECTION, SOLUTION INTRAVENOUS at 07:51

## 2018-11-23 RX ADMIN — CEFAZOLIN SODIUM 1000 MG: 1 SOLUTION INTRAVENOUS at 09:55

## 2018-11-23 RX ADMIN — FENTANYL CITRATE 50 MCG: 50 INJECTION, SOLUTION INTRAMUSCULAR; INTRAVENOUS at 09:42

## 2018-11-23 RX ADMIN — HYDROMORPHONE HYDROCHLORIDE 0.5 MG: 2 INJECTION, SOLUTION INTRAMUSCULAR; INTRAVENOUS; SUBCUTANEOUS at 14:03

## 2018-11-23 RX ADMIN — SODIUM CHLORIDE: 0.9 INJECTION, SOLUTION INTRAVENOUS at 12:50

## 2018-11-23 RX ADMIN — ACETAMINOPHEN 975 MG: 325 TABLET, FILM COATED ORAL at 07:40

## 2018-11-23 RX ADMIN — METRONIDAZOLE 500 MG: 500 INJECTION, SOLUTION INTRAVENOUS at 09:55

## 2018-11-23 RX ADMIN — NALOXONE HYDROCHLORIDE 0.1 MG: 0.4 INJECTION, SOLUTION INTRAMUSCULAR; INTRAVENOUS; SUBCUTANEOUS at 17:04

## 2018-11-23 RX ADMIN — EPHEDRINE SULFATE 10 MG: 50 INJECTION, SOLUTION INTRAMUSCULAR; INTRAVENOUS; SUBCUTANEOUS at 10:55

## 2018-11-23 RX ADMIN — GLYCOPYRROLATE 0.4 MG: 0.2 INJECTION, SOLUTION INTRAMUSCULAR; INTRAVENOUS at 14:35

## 2018-11-23 RX ADMIN — IOHEXOL 20 ML: 300 INJECTION, SOLUTION INTRAVENOUS at 09:10

## 2018-11-23 RX ADMIN — ROCURONIUM BROMIDE 10 MG: 10 INJECTION INTRAVENOUS at 11:28

## 2018-11-23 RX ADMIN — HYDROMORPHONE HYDROCHLORIDE 0.5 MG: 2 INJECTION, SOLUTION INTRAMUSCULAR; INTRAVENOUS; SUBCUTANEOUS at 14:33

## 2018-11-23 RX ADMIN — ALBUMIN (HUMAN): 12.5 SOLUTION INTRAVENOUS at 10:39

## 2018-11-23 RX ADMIN — PHENYLEPHRINE HYDROCHLORIDE 20 MCG/MIN: 10 INJECTION INTRAVENOUS at 13:30

## 2018-11-23 RX ADMIN — DEXTROSE, SODIUM CHLORIDE, AND POTASSIUM CHLORIDE 125 ML/HR: 5; .45; .15 INJECTION INTRAVENOUS at 17:22

## 2018-11-23 RX ADMIN — FENTANYL CITRATE 50 MCG: 50 INJECTION, SOLUTION INTRAMUSCULAR; INTRAVENOUS at 09:50

## 2018-11-23 RX ADMIN — ROCURONIUM BROMIDE 20 MG: 10 INJECTION INTRAVENOUS at 11:38

## 2018-11-23 RX ADMIN — ROCURONIUM BROMIDE 20 MG: 10 INJECTION INTRAVENOUS at 12:14

## 2018-11-23 RX ADMIN — GABAPENTIN 100 MG: 100 CAPSULE ORAL at 07:40

## 2018-11-23 RX ADMIN — CEFAZOLIN SODIUM 2000 MG: 2 SOLUTION INTRAVENOUS at 22:22

## 2018-11-23 RX ADMIN — HYDROMORPHONE HYDROCHLORIDE 0.5 MG: 2 INJECTION, SOLUTION INTRAMUSCULAR; INTRAVENOUS; SUBCUTANEOUS at 11:02

## 2018-11-23 RX ADMIN — FENTANYL CITRATE 50 MCG: 50 INJECTION, SOLUTION INTRAMUSCULAR; INTRAVENOUS at 08:49

## 2018-11-23 RX ADMIN — EPHEDRINE SULFATE 10 MG: 50 INJECTION, SOLUTION INTRAMUSCULAR; INTRAVENOUS; SUBCUTANEOUS at 10:42

## 2018-11-23 RX ADMIN — ONDANSETRON 4 MG: 2 INJECTION INTRAMUSCULAR; INTRAVENOUS at 14:35

## 2018-11-23 RX ADMIN — ROCURONIUM BROMIDE 50 MG: 10 INJECTION INTRAVENOUS at 09:50

## 2018-11-23 NOTE — ANESTHESIA PROCEDURE NOTES
Arterial Line Insertion  Date/Time: 11/23/2018 10:01 AM  Performed by: Kia Retana  Authorized by: Georgia Henry   Consent: Verbal consent obtained  Written consent obtained  Risks and benefits: risks, benefits and alternatives were discussed  Consent given by: patient  Patient understanding: patient states understanding of the procedure being performed  Patient consent: the patient's understanding of the procedure matches consent given  Procedure consent: procedure consent matches procedure scheduled  Relevant documents: relevant documents present and verified  Patient identity confirmed: verbally with patient and arm band  Time out: Immediately prior to procedure a "time out" was called to verify the correct patient, procedure, equipment, support staff and site/side marked as required  Preparation: Patient was prepped and draped in the usual sterile fashion  Indications: hemodynamic monitoring    Location: radial artery  Anesthesia: see MAR for details ([place])  Sedation:  Patient sedated: yes ([place])  Analgesia: see MAR for details  Vitals: Vital signs were monitored during sedation      Procedure Details:  Gabriele's test normal: yes  Needle gauge: 20  Seldinger technique: Seldinger technique used  Number of attempts: 2    Post-procedure:  Post-procedure: dressing applied  Waveform: good waveform and waveform confirmed  Post-procedure CNS: normal and unchanged  Patient tolerance: Patient tolerated the procedure well with no immediate complications

## 2018-11-23 NOTE — ANESTHESIA PREPROCEDURE EVALUATION
Review of Systems/Medical History  Patient summary reviewed  Chart reviewed  No history of anesthetic complications     Cardiovascular  EKG reviewed,    Pulmonary  Negative pulmonary ROS Not a smoker ,        GI/Hepatic      Comment: Confirmed NPO appropriate     Negative  ROS        Endo/Other  Negative endo/other ROS      GYN      Comment: Ovarian versus endometrial cancer     Hematology      Comment: LE DVT on xarelto (held Sunday, 11/18)  11/14 INR 1 3, platelet count 545 Musculoskeletal    Comment: No prior procedure on spine      Neurology   Psychology           Physical Exam    Airway    Mallampati score: III  TM Distance: >3 FB  Neck ROM: full     Dental   No notable dental hx     Cardiovascular  Rhythm: regular, No murmur,     Pulmonary  Breath sounds clear to auscultation,     Other Findings        Anesthesia Plan  ASA Score- 3     Anesthesia Type- general with ASA Monitors  Additional Monitors: arterial line  Airway Plan: ETT  Comment: Discussed risks and benefits of thoracic epidural for post-op pain management depending on plans for anticoagulation  Update: Discussed case with Dr Ayden Hopper, who anticipates placing patient on therapeutic anticoagulation immediately following surgery  Will forego epidural placement        Plan Factors-    Induction- intravenous  Postoperative Plan- Plan for postoperative opioid use  Planned trial extubation    Informed Consent- Anesthetic plan and risks discussed with patient

## 2018-11-23 NOTE — H&P (VIEW-ONLY)
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 who has responded well to 3 cycles of neoadjuvant chemotherapy with carboplatin and Taxol   has declined to 37 units/ml from a starting value of 1112 units/ml  CT of the chest abdomen and pelvis demonstrates measurable response with resolution of ascites and decrease in size of her left adnexal mass  A 1 1 cm lung nodule in the right lower lobe is unchanged and was not evident on PET scan  She has a DVT and is currently receiving therapeutic anticoagulation with Xarelto  Genetic testing was negative for BRCA mutation  Her performance status is 0   1  I discussed the risks and benefits of diagnostic laparoscopy with possible conversion to exploratory laparotomy, total abdominal hysterectomy, bilateral salpingo-oophorectomy with tumor debulking as indicated, possible intraperitoneal port placement  She understands the risks and benefits of the operation and agrees to proceed as outlined  Consent was obtained by me in the office for surgery as well as for potential blood transfusion  2  Preoperative insertion of removable IVC filter  She was counseled regarding stopping therapeutic anticoagulation preoperatively and regarding postoperative transition from IV therapeutic anticoagulation to oral   3  Postoperatively, the plan will be to continue with chemotherapy  If an intraperitoneal port is able to be inserted, the plan will be to receive 4 cycles of chemo post surgery             Relevant Medications    neomycin (MYCIFRADIN) 500 mg tablet    Other Relevant Orders    Case request operating room: HYSTERECTOMY TOTAL ABDOMINAL (JUJU), BSO, debulking, LAPAROSCOPY DIAGNOSTIC (Completed)    HEMOGLOBIN A1C W/ EAG ESTIMATION    Type and screen    APTT    Protime-INR    EKG 12 lead    Provide patient education on bowel prep instructions       Other    Ovarian mass, left    Relevant Orders    Case request operating room: HYSTERECTOMY TOTAL ABDOMINAL (JUJU), BSO, debulking, LAPAROSCOPY DIAGNOSTIC (Completed)              CHIEF COMPLAINT:  Treatment discussion        Previous therapy:     Gynecologic cancer (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  Initially, she had extensive large volume ascites and a 4 cm adnexal mass on the left  A 1 1 cm pulmonary lesion was also identified and was found to not have any uptake by PET scan  She has tolerated her chemotherapy very well without neuropathy  DVT diagnosed at presentation has been treated with therapeutic Xarelto  She currently has no complaints  No pelvic pain or vaginal bleeding  No interval change in her medications or medical history since her last visit  Her quality of life is good  Review of Systems   Constitutional: Negative for activity change and unexpected weight change  HENT: Negative  Eyes: Negative  Respiratory: Negative  Cardiovascular: Negative  Gastrointestinal: Negative for abdominal distention and abdominal pain  Endocrine: Negative  Genitourinary: Negative for pelvic pain and vaginal bleeding  Musculoskeletal: Negative  Skin: Negative  Allergic/Immunologic: Negative  Neurological: Negative  Hematological: Negative  Psychiatric/Behavioral: Negative  Current Outpatient Prescriptions   Medication Sig Dispense Refill    rivaroxaban (XARELTO) 20 mg tablet Take 1 tablet (20 mg total) by mouth daily with breakfast 30 tablet 3    bisacodyl (DULCOLAX) 5 mg EC tablet Take 4 tablets (20 mg total) by mouth once for 1 dose Take 4 tablets with 8 oz water at 2 PM day before procedure 4 tablet 0    enoxaparin (LOVENOX) 40 mg/0 4 mL Inject 40 mg under the skin 2 (two) times a day      LORazepam (ATIVAN) 1 mg tablet Take 0 5 mg by mouth every 6 (six) hours as needed for anxiety (nausea)      Methylprednisolone 4 MG TBPK Use as directed on package (Patient not taking: Reported on 2018 ) 21 tablet 0    neomycin (MYCIFRADIN) 500 mg tablet Take 2 tablets (1,000 mg total) by mouth 3 (three) times a day for 3 doses Take at 1 PM, 4 PM, and 9 PM the day before procedure  6 tablet 0    ondansetron (ZOFRAN) 8 mg tablet Take 1 tablet (8 mg total) by mouth every 8 (eight) hours as needed for nausea or vomiting (Patient not taking: Reported on 2018 ) 30 tablet 1     No current facility-administered medications for this visit          No Known Allergies    Past Medical History:   Diagnosis Date    DVT (deep venous thrombosis) (HCC)     Ovarian mass, left        Past Surgical History:   Procedure Laterality Date    ABDOMINAL SURGERY      APPENDECTOMY      CLAVICLE SURGERY      FOOT SURGERY      IR PARACENTESIS  2018    IR PARACENTESIS  2018    IR PORT PLACEMENT  2018       OB History      Para Term  AB Living    0 0 0 0 0 0    SAB TAB Ectopic Multiple Live Births    0 0 0 0 0          Family History   Problem Relation Age of Onset    Heart disease Father     Cancer Brother        The following portions of the patient's history were reviewed and updated as appropriate: allergies, current medications, past family history, past medical history, past social history, past surgical history and problem list       Objective:    Blood pressure 138/78, pulse 88, temperature 97 8 °F (36 6 °C), temperature source Oral, resp  rate 16, height 5' 1" (1 549 m), weight 75 3 kg (166 lb)  Body mass index is 31 37 kg/m²  Physical Exam   Constitutional: She is oriented to person, place, and time  She appears well-developed and well-nourished  No distress  HENT:   Head: Normocephalic and atraumatic  Eyes: Right eye exhibits no discharge  Left eye exhibits no discharge  No scleral icterus  Neck: Normal range of motion  Neck supple  No thyromegaly present  Cardiovascular: Normal rate, regular rhythm and normal heart sounds  Pulmonary/Chest: Effort normal and breath sounds normal    Abdominal: Soft  She exhibits no distension and no mass  There is no tenderness  There is no rebound and no guarding  Genitourinary:   Genitourinary Comments: The external female genitalia is normal  The bartholin's, uretheral and skenes glands are normal  The urethral meatus is normal (midline with no lesions)  Anus without fissure or lesion  Speculum exam reveals vagina without lesion or discharge  Cervix has a small lesion present at the office    No bleeding  No significant cystocele or rectocele noted  Bimanual exam notes a uterus with normal contour, mobility  No tenderness  Adnexa without masses or tenderness  Bladder is without fullness, mass or tenderness  Her previous exam demonstrated a fixed pelvis  Musculoskeletal: She exhibits no edema  Lymphadenopathy:     She has no cervical adenopathy  Neurological: She is alert and oriented to person, place, and time  Skin: Skin is warm and dry  She is not diaphoretic  Psychiatric: She has a normal mood and affect   Her behavior is normal  Judgment and thought content normal          Lab Results   Component Value Date     37 1 (H) 10/31/2018     Lab Results   Component Value Date    WBC 7 71 11/07/2018    HGB 11 1 (L) 11/07/2018    HCT 35 0 11/07/2018    MCV 87 11/07/2018     11/07/2018     Lab Results   Component Value Date    K 3 9 11/07/2018     11/07/2018    CO2 27 11/07/2018    BUN 17 11/07/2018    CREATININE 0 71 11/07/2018    CALCIUM 9 0 11/07/2018    AST 15 11/07/2018    ALT 17 11/07/2018    ALKPHOS 120 (H) 11/07/2018    EGFR 81 11/07/2018

## 2018-11-23 NOTE — BRIEF OP NOTE (RAD/CATH)
IR IVC PLACE SED  Procedure Note    PATIENT NAME: Kenn Esteban  : 1940  MRN: [id number]     Pre-op Diagnosis: No diagnosis found  Post-op Diagnosis: No diagnosis found  Surgeon:   Richa Hernandez MD  Assistants:     No qualified resident was available, Resident is only observing    Estimated Blood Loss: minimal     Findings:     Right IJ approach IVC cavogram, temp aln filter placed in infra renal location       Specimens: none    Complications:  none    Anesthesia: Conscious sedation and Roney Trent MD     Date: 2018  Time: 9:02 AM

## 2018-11-23 NOTE — PERIOPERATIVE NURSING NOTE
Dr Clover Issa at bedside to evaluate patient, Patient continues to be very lethargic, opens eyes to painful stimuli, starting to partially obstruct again  Narcan ordered and given  Minutes following administration, patient spontaneously opened eyes, following commands, smiling, reporting no pain and AAOx4  Update given to waiting room

## 2018-11-23 NOTE — ANESTHESIA PROCEDURE NOTES
Peripheral Block    Patient location during procedure: OR  Start time: 11/23/2018 3:02 PM  Reason for block: post-op pain management  Staffing  Anesthesiologist: River Jamison  Resident/CRNA: Jose Stubbs  Performed: anesthesiologist   Preanesthetic Checklist  Completed: patient identified, surgical consent, pre-op evaluation, timeout performed, IV checked and monitors and equipment checked  Peripheral Block  Patient position: supine  Prep: ChloraPrep  Patient monitoring: heart rate, cardiac monitor, continuous pulse ox and frequent blood pressure checks  Block type: TAP  Laterality: bilateral  Injection technique: single-shot  Procedures: ultrasound guided  Ultrasound permanent image saved  Local infiltration: ropivacaine  Infiltration strength: 0 2 %  Dose: 60 mL  Needle  Needle type: Stimuplex   Needle length: 5 cm  Needle localization: ultrasound guidance  Needle insertion depth: 5 cm  Assessment  Injection assessment: incremental injection and negative aspiration for heme  Slow fractionated injection: yes  patient tolerated the procedure well with no immediate complications  Additional Notes  Internal oblique and transverse abdominus muscles identified on each side under ultrasound guidance  30mL of 0 2% deposited in plane between internal oblique and transverse abdominus muscles on each side  Muscles visualized  from each other as local anesthetic was incrementally injected  No immediate complications

## 2018-11-23 NOTE — PERIOPERATIVE NURSING NOTE
Patient partially obstructing since arrival to PACU, jaw thrust performed several times by myself at bedside with noted improved air exchange  Oral airway inserted using tongue blade, Dr Stepna Monroe aware, will continue to monitor patient

## 2018-11-23 NOTE — OP NOTE
OPERATIVE REPORT  PATIENT NAME: Gilberto Leslie    :  1940  MRN: [id number]  Pt Location: BE OR ROOM 09    SURGERY DATE: 2018    Surgeon(s) and Role:     * Reid Dao MD - Primary     * Lita Ken MD - Yamileth Hemphill MD - Assisting    Preop Diagnosis:  Gynecologic cancer [facility]) [C57 9]  Ovarian mass, left [N83 9]    Post-Op Diagnosis Codes:     * Gynecologic cancer ([facility] [address] ) [C57 9]     * Ovarian mass, left [N83 9]    Procedure(s) (LRB):  MODIFIED RADICAL HYSTERECTOMY, BILATERAL SALPINGOOPHORECTOMY, RECTO-SIGMOID COLECTOMY WITH END TO END ANASTAMOSIS, TAKEDOWN OF SPLENIC FLEXURE (N/A)  LAPAROSCOPY DIAGNOSTIC (N/A)  LAPAROTOMY EXPLORATORY (N/A)  GASTROCOLIC OMENTECTOMY (N/A)  CYSTOSCOPY (N/A)    Specimen(s):  ID Type Source Tests Collected by Time Destination   1 : falciform ligament Tissue Other TISSUE EXAM Reid Dao MD 2018 1039    2 : gastro colic omentum Tissue Omentum TISSUE EXAM Reid Dao MD 2018 1059    3 : epiploic appendage Tissue Other TISSUE EXAM Reid Dao MD 2018 1125    4 : right pelvic Tissue Peritoneum TISSUE EXAM Reid Dao MD 2018 1228    5 : with cervix,and rectosigmoid colon Tissue Uterus w/Bilateral Ovaries and Fallopian Tubes TISSUE EXAM Reid Dao MD 2018 1250    6 : Right Pelvic Side Wall Peritoneum Tissue Peritoneum TISSUE EXAM Reid Dao MD 2018 1259    7 : Left I P  Tissue Soft Tissue, Other TISSUE EXAM Reid Dao MD 2018 1307    8 : Anastamotic Donuts Tissue Colon TISSUE EXAM Reid Dao MD 2018 1328        Estimated Blood Loss:   300 mL    Drains:  Closed/Suction Drain Left LLQ Bulb 19 Fr  (Active)   Number of days: 0       NG/OG/Enteral Tube Nasogastric 18 Fr Left nares (Active)   Number of days: 0       Urethral Catheter Non-latex 16 Fr   (Active)   Number of days: 0       Anesthesia Type:   General    Operative Indications:  Gynecologic cancer ([facility] [address] ) [C57 9]  Ovarian mass, left [N809]  77year-old with elevated , extensive ascites, omental caking and at ovarian mass who underwent 3 cycles of neoadjuvant chemotherapy with carboplatin and Taxol with a significant measurable as well as biochemical response  She presented for tumor debulking  Operative Findings:  1  Exam under anesthesia revealed a mobile uterus  2  On laparoscopy, there was no significant evidence of peritoneal disease  3  On laparotomy, the omentum was noted to be edematous and thickened to the gastrosplenic ligament  The left side of the omentum was noted to be more edematous and thickened than the right  The entire infracolic omentum was abnormal and thickened  There was no diaphragmatic disease  No mesenteric disease  In the pelvis, the rectosigmoid colon was also edematous and thickened adherent to the pelvic sidewall suggestive of malignancy  Therefore, the decision was made to perform a rectosigmoid resection  Both ovaries appeared to be grossly involved  The uterus was grossly normal   The epiploic appendages on the rectosigmoid colon were adherent to the left pelvic sidewall and 1 involving the bladder serosa where there was gross residual tumor present  4  On cystoscopy, no evidence of bladder injury was identified  5  At the conclusion of the procedure, there was no visible evidence of residual disease  Complications:   None    Procedure and Technique:  After informed consent was obtained, the patient was taken initially for an IVC filter placement  She was then taken to the operating room where general endotracheal anesthesia was administered without incident  She was prepped and draped in normal sterile fashion in the low dorsal lithotomy position  Examination under anesthesia revealed the above-mentioned findings  Bishop catheter was inserted  Attention was then turned to the abdomen    0 25% Marcaine was used to infiltrate the skin in the midline above the umbilicus  A 5 mm skin incision was made using 11 blade scalpel  Through this was passed a 5 mm trocar under direct visualization into the abdominal cavity  The abdomen is then insufflated to 15 mm of mercury using CO2 gas  Findings on laparoscopy are noted as above  Decision was made to perform exploratory laparotomy  Midline vertical incision was made using cutting current cautery  This was taken down to the underlying layer of fascia with cautery as well  The fascia was opened in the midline the fascial incision was then extended superiorly and inferiorly  The peritoneum was then identified and entered with cautery  The gas was expelled from the abdominal cavity  The peritoneal incision was then extended superiorly and inferiorly as well  A Bookwalter retractor was placed  Attention was 1st turned to the upper abdomen  The omentum was freed from adhesions to the right colon as well as the transverse colon mesentery  With these adhesions were released, the lesser sac was able to be entered  The transverse colon was dissected free of the omentum from the hepatic flexure to the splenic flexure  The gastrocolic omentectomy was performed in the usual fashion with the EnSeal bipolar cautery device  A small portion of the gastrocolic omentum remained at the distal edge of the greater curvature of the stomach  This was not involved  The omentum was removed to the level of the splenic hilum and the posterior tip of the spleen  It was freed from adhesions to the left upper quadrant and the splenic flexure  The entire specimen was sent off as permanent section  One of the short gastric vessels was noted to be bleeding at the conclusion of the omentectomy  This was then controlled using interrupted figure-of-eight 3 0 Vicryl suture  Hemostasis was excellent  A  Lap sponge was left in the left upper quadrant  The remainder of the mesentery was inspected    Bilateral gutters, Morison's pouch, right hemidiaphragm, left hemidiaphragm all noted to be free of disease  The bowel was then packed away with moist laparotomy sponges  The uterus was grasped with Dai clamps at the cornua  The retroperitoneal spaces were opened bilaterally using monopolar cautery  The ureters were identified coursing normally within the medial leaf of the broad ligaments bilaterally  The bilateral infundibulopelvic ligaments were then skeletonized, clamped with zeppelin clamps transected and secured using 2 free ties of 0 Vicryl suture  The specimen side was then tied to the Mireille Pipes clamps using 0 Vicryl suture  Adhesions from the sigmoid colon to the left pelvic sidewall were lysed  Adhesions between the sigmoid colon and the bladder serosa were also lysed  Several epiploic appendages were removed during this dissection  These were also sent off for permanent section  Given the involvement of the serosa of the bladder, an incision was made on the serosa below the gross tumor that was present at the vesicouterine fold  The vesicovaginal space was then able to be opened and the bladder was taken down below the level of the external os of the cervix while the serosal metastatic disease remained with the uterus  The bilateral uterine vessels were then taken using zeppelin clamps  It was secured using 0 Vicryl suture  The proximal end of the sigmoid colon was identified at the pelvic brim  This was skeletonized and transected using a contour stapling device  The rectosigmoid mesentery was then cauterized and transected using the EnSeal bipolar cautery device  The inferior mesenteric artery and vein were cauterized and transected with the EnSeal as well  The retrorectal space was opened  The ureters were freed from their medial peritoneal attachments and the peritoneum was then dissected medially  The pararectal tissue was cauterized and transected and the rectum was lifted    An anterior colpotomy was made using monopolar cautery  A posterior colpotomy was then made  The rectovaginal septum was opened  The rectum was dissected free below the cul-de-sac  The cardinal ligaments were then taken at the level of the ureters using zeppelin clamps  Once the uterus and cervix were freed with the exception of the cul-de-sac attachment to the sigmoid colon, the distal portion of the sigmoid/rectum was skeletonized enough to allow the contour stapler to transect the distal portion of the sigmoid colon and proximal rectum  This is approximately 8 cm from the verge  The entire specimen was then removed en block and included the modified radical hysterectomy, bilateral salpingo-oophorectomy, rectosigmoid colon  The vagina was then closed using interrupted figure-of-eight 0 Vicryl suture  The pelvis was irrigated  The left pericolic gutter was incised using cautery  This was taken all the way up to the level of the splenic flexure  The splenic flexure was then taken down to give additional mobility to the distal portion of the sigmoid colon  The ureter on the left side was further dissected and additional mobility was gained by partially transecting some of the mesentery above the ureter  This allowed for a tension-free anastomosis  The proximal portion of the rectosigmoid colon was opened just below the staple line using cutting current cautery  There was noted to be fresh bleeding  A 3-0 Prolene suture was used to place a pursestring  The 29 EEA anvil was then placed within the proximal portion of the sigmoid colon  It was secured using the pursestring  The anastomosis was then performed using a 29 EEA stapling device that was placed transrectally  Two complete donuts were noted  These were sent off for permanent section    Irrigation was performed in the pelvis and the Bethany Splinter was used to inflate air into the distal rectosigmoid colon while the proximal end was constricted at the pelvic brim   Air passed easily through the anastomosis and there was no evidence of air leak  A 19 Aquiles drain was then placed in the pelvis exiting the left lower quadrant and secured using a 3-0 nylon suture  Attention was then returned to the upper abdomen  The lap sponge was removed  Hemostasis was achieved  The abdomen pelvis were then irrigated  Due to the dissection around the bladder, the bladder was filled with 150 cc of water and the 5 mm laparoscope was then used as a cystoscope with findings noted as above  The vagina was inspected  There was no evidence of bleeding noted  Gowns and gloves were then exchanged  The closure tray was opened  The incision was then closed using 1  Looped PDS in a running fashion to the fascia  Subcutaneous space was irrigated  The adipose tissue was closed using 2-0 plain suture and the skin was closed using 3-0 stratafix in a subcuticular fashion followed by histoacryl  A TAPS block was then performed by anesthesia  Sterile dressings were applied  She was then awakened and transferred to the recovery room in stable condition  All instrument counts correct x2 for the procedure  No complications  Estimated blood loss is 300 mL      I was present for the entire procedure    Patient Disposition:  PACU     SIGNATURE: Maggie Ryder MD  DATE: November 23, 2018  TIME: 2:51 PM

## 2018-11-23 NOTE — ANESTHESIA POSTPROCEDURE EVALUATION
Post-Op Assessment Note      CV Status:  Stable    Mental Status:  Alert and awake    Hydration Status:  Euvolemic    PONV Controlled:  Controlled    Airway Patency:  Patent    Post Op Vitals Reviewed: Yes          Staff: CRNA           BP (P) 155/75 (11/23/18 1523)    Temp (!) 97 2 °F (36 2 °C) (11/23/18 1523)    Pulse 91 (11/23/18 1523)   Resp 18 (11/23/18 1523)    SpO2   98%

## 2018-11-23 NOTE — DISCHARGE INSTRUCTIONS
[facility]  Philippe Blanchard, Jean Cortez, and Raghav Hatch  [phone]    Hysterectomy Discharge Instructions    WHAT YOU NEED TO KNOW:   A hysterectomy is surgery to remove your uterus  Your ovaries, fallopian tubes, cervix, or part of your vagina may also need to be removed  The organs and tissue that will be removed depends on your medical condition  After a hysterectomy, you will not be able to become pregnant  If your ovaries are removed, you will go through menopause if you have not already  DISCHARGE INSTRUCTIONS:   Contact your doctor at the number above if:   · You have a fever over 101o  · You have nausea or are vomiting that does not improve after a light meal    · Your pain is getting worse, even after you take medicine  · You feel pain or burning when you urinate, or you have trouble urinating  · You have pus or a foul-smelling odor coming from your vagina  · Your wound is red, swollen, or draining pus  · You see new or an increased amount of bright red blood coming from your vagina or your incisions  · You have questions or concerns about your condition or care  Seek care immediately:   · Your arm or leg feels warm, tender, and painful  It may look swollen and red  · You have increasing abdominal or pelvic pain  · You have heavy vaginal bleeding that fills 1 or more sanitary pads in 1 hour  Call 911 for any of the following:   · You feel lightheaded, short of breath, and have chest pain  · You cough up blood  Medicines: You may need any of the following:  · Prescription medicine may be given  You may receive a prescription for pain medication or be advised to use over the counter (OTC) pain medication such as acetaminophen (Tylenol) or ibuprofen (Advil, Motrin)  Ask your healthcare provider how to take this medicine safely  · NSAIDs , such as ibuprofen, help decrease swelling, pain, and fever   NSAIDs can cause stomach bleeding or kidney problems in certain people  If you take blood thinner medicine, always ask your healthcare provider if NSAIDs are safe for you  Always read the medicine label and follow directions  · Stool softeners help treat or prevent constipation  · Take your medicine as directed  Contact your healthcare provider if you think your medicine is not helping or if you have side effects  Tell him or her if you are allergic to any medicine  Keep a list of the medicines, vitamins, and herbs you take  Include the amounts, and when and why you take them  Bring the list or the pill bottles to follow-up visits  Carry your medicine list with you in case of an emergency  Activity:   · Rest as needed  Get up and move around as directed to help prevent blood clots  Start with short walks and slowly increase the distance every day  Limit the number of times you climb stairs to 2 times each day for the first week  Plan most of your daily activities on one level of your home  · Do not lift objects heavier than 10 pounds for 6 weeks  Avoid strenuous activity for 2 weeks  · Do not strain during bowel movements  High-fiber foods and extra liquids can help you prevent constipation  Examples of high-fiber foods are fruit and bran  Prune juice and water are good liquids to drink  · Do not have sex, use tampons, or douche for up to 8 weeks  You may shower as soon as the day after surgery  Tub baths can be taken starting 2 weeks after surgery  Do not go into pools or hot tubs until cleared by your doctor  · Ask when it is safe for you to drive  It is generally safe to drive after 2 weeks and when no longer taking prescription pain medication  · Ask when you may return to work and to other regular activities  Wound care: Care for your abdominal incisions as directed  Carefully wash around the wound with soap and water   If you have Hibiclens or medicated soap that you were instructed to use before surgery, you may use that to wash with for up to 2 days after surgery  If not, any mild non-scented, non-abrasive soap is safe  It is okay to let the soap and water run over your incision  Do not scrub your incision  Dry the area and put on new, clean bandages as directed  Change your bandages when they get wet or dirty  If you have strips of medical tape, let them fall off on their own  It may take 7 to 14 days for them to fall off  Check your incision every day for redness, swelling, or pus  Deep breathing: Take deep breaths and cough 10 times each hour  This will decrease your risk for a lung infection  Take a deep breath and hold it for as long as you can  Let the air out and then cough strongly  Deep breaths help open your airway  You may be given an incentive spirometer to help you take deep breaths  Put the plastic piece in your mouth and take a slow, deep breath, then let the air out and cough  Repeat these steps 10 times every hour  Get support: This surgery may be life-changing for you and your family  You will no longer be able to get pregnant  Sudden changes in the levels of your hormones may occur and cause mood swings and depression  You may feel angry, sad, or frightened, or cry frequently and unexpectedly  These feelings are normal  Talk to your healthcare provider about where you can get support  You can also ask if hormone replacement medicine is right for you  Follow up with your healthcare provider or gynecologist as directed: You may need to return to have stitches removed, and for other tests  Write down your questions so you remember to ask them during your visits  © 2017 [employer] Information is for End User's use only and may not be sold, redistributed or otherwise used for commercial purposes  All illustrations and images included in CareNotes® are the copyrighted property of [employer]  or [employer]    The above information is an educational aid only  It is not intended as medical advice for individual conditions or treatments  Talk to your doctor, nurse or pharmacist before following any medical regimen to see if it is safe and effective for you  Inferior Vena Cava Filter Placement     WHAT YOU NEED TO KNOW:   Inferior vena cava filter placement is surgery to place a filter into your inferior vena cava (IVC)  The IVC is a large blood vessel that brings blood from your lower body back to your heart  The filter is a small mesh strainer made of thin wires  It is placed in the center of the IVC to trap blood clots going to your heart or lungs  Filter types: Permanent Filters are used for patients who can't have anticoagulation medications  The filters are left in place permanently  Optional filters: Are filters that can be removed when a patient's risk for clotting is decreased  DISCHARGE INSTRUCTIONS:     Wound care: Keep your wound clean and dry  Band aid may come off in 24 hours  Self-care:   · Limit activity: Do not lift, pull or push heavy objects for 24 hours  Slowly start to do more each day  Return to your daily activities as directed  · Resume your normal diet  Small sips of flat soda will help with nausea  Contact Interventional Radiology at [phone] Stephanie PATIENTS: Contact Interventional Radiology at [phone]) Triny Escobar PATIENTS: Contact Interventional Radiology at [phone]) if:  · You have a fever  · You have chills, a cough, or feel weak and achy  · Persistent nausea or vomiting  · Your wound is red, swollen, or draining pus  · You have questions or concerns about your condition  · Optional filters can and should  be removed when you no longer need it    · Please call Interventional Radiology to make your removal appointment

## 2018-11-23 NOTE — DISCHARGE SUMMARY
Discharge Summary   Chica Jaramillo MRN: [id number]  [facility] Encounter: [id number]      Admission Date: 11/23/2018     Discharge Date: 11/28/2018    Attending: Arlene Clifton MD   Discharge attending: Debra Stephens MD    Principal Diagnosis: Gynecologic cancer [facility]) [C57 9]  Ovarian mass, left [N83 9]    Secondary Diagnosis:   DVT    Hospital course: Chica Jaramillo is a 78 yo who was admitted for scheduled dx lap, ex laparotomy, modified radical hysterectomy, BSO, rectosigmoid resection with end-to-end reanastomosis, omentectomy, and optimal tumor debulking  1  Ovarian cancer  - s/p surgery  - follow-up final pathology  - s/p 3 cycles neoadjuvant chemotherapy      2  Post-op care  - Transitioned to oral pain medications on 11/27  - s/p NGT, tolerating regular diet  - S/p Antibiotics (ancef/flagyl) x72h secondary to stool spillage     3  DVT  - IVC filter in place  - To restart home Xarelto prior to discharge     4  FEN  - Tolerating regular diet  - JESSI drain after procedure, pulled prior to discharge     5  VTE ppx  - IVC filter, SCDs on/on  - Therapeutic heparin held due to decrease in hemoglobin s/p surgery  - Xarelto 20mg restarted prior to discharge due to stable hemoglobin    6   Dispo: Home  - Miralax daily, colace BID   - Xarelto 20mg daily  - Patient to follow up at scheduled appointment on 12/6/18         Lab Results:   Lab Results   Component Value Date    WBC 8 14 11/28/2018    WBC 8 63 11/28/2018    HGB 8 7 (L) 11/28/2018    HGB 8 7 (L) 11/28/2018    HCT 27 6 (L) 11/28/2018    HCT 27 8 (L) 11/28/2018    MCV 89 11/28/2018    MCV 89 11/28/2018     11/28/2018     11/28/2018     Lab Results   Component Value Date    CALCIUM 9 0 11/27/2018    K 3 6 11/27/2018    CO2 30 11/27/2018     11/27/2018    BUN 6 11/27/2018    CREATININE 0 53 (L) 11/27/2018     Lab Results   Component Value Date/Time    POCGLU 155 (H) 11/23/2018 03:56 PM    POCGLU 92 09/18/2018 11:25 AM     Lab Results   Component Value Date    PTT 32 11/28/2018     Lab Results   Component Value Date    INR 1 27 (H) 11/14/2018    INR 1 06 08/15/2018    PROTIME 13 2 (H) 11/14/2018    PROTIME 11 1 16/57/4001       Complications: none apparent    Condition at discharge: stable     Discharge instructions/Information to patient and family:   See After Visit Summary for information provided to patient and family  Provisions for Follow-Up Care:  See After Visit Summary for information related to follow-up care and any pertinent home health orders  Disposition: See After Visit Summary for discharge disposition information  Planned Readmission: Yes, due to nature of disease    Discharge Medications: For a complete list of the patient's medications, please refer to her med rec        Jyoti Becker MD, PGY-1  11/28/2018  6:59 AM

## 2018-11-24 LAB
ANION GAP SERPL CALCULATED.3IONS-SCNC: 6 MMOL/L (ref 4–13)
BASOPHILS # BLD AUTO: 0.01 THOUSANDS/ΜL (ref 0–0.1)
BASOPHILS NFR BLD AUTO: 0 % (ref 0–1)
BUN SERPL-MCNC: 14 MG/DL (ref 5–25)
CALCIUM SERPL-MCNC: 8.1 MG/DL (ref 8.3–10.1)
CHLORIDE SERPL-SCNC: 108 MMOL/L (ref 100–108)
CO2 SERPL-SCNC: 26 MMOL/L (ref 21–32)
CREAT SERPL-MCNC: 0.65 MG/DL (ref 0.6–1.3)
EOSINOPHIL # BLD AUTO: 0.01 THOUSAND/ΜL (ref 0–0.61)
EOSINOPHIL NFR BLD AUTO: 0 % (ref 0–6)
ERYTHROCYTE [DISTWIDTH] IN BLOOD BY AUTOMATED COUNT: 20.7 % (ref 11.6–15.1)
ERYTHROCYTE [DISTWIDTH] IN BLOOD BY AUTOMATED COUNT: 20.9 % (ref 11.6–15.1)
GFR SERPL CREATININE-BSD FRML MDRD: 85 ML/MIN/1.73SQ M
GLUCOSE SERPL-MCNC: 137 MG/DL (ref 65–140)
HCT VFR BLD AUTO: 31.7 % (ref 34.8–46.1)
HCT VFR BLD AUTO: 32.2 % (ref 34.8–46.1)
HGB BLD-MCNC: 10.1 G/DL (ref 11.5–15.4)
HGB BLD-MCNC: 10.2 G/DL (ref 11.5–15.4)
IMM GRANULOCYTES # BLD AUTO: 0.06 THOUSAND/UL (ref 0–0.2)
IMM GRANULOCYTES NFR BLD AUTO: 0 % (ref 0–2)
LYMPHOCYTES # BLD AUTO: 1.39 THOUSANDS/ΜL (ref 0.6–4.47)
LYMPHOCYTES NFR BLD AUTO: 10 % (ref 14–44)
MAGNESIUM SERPL-MCNC: 1.9 MG/DL (ref 1.6–2.6)
MCH RBC QN AUTO: 28.3 PG (ref 26.8–34.3)
MCH RBC QN AUTO: 28.3 PG (ref 26.8–34.3)
MCHC RBC AUTO-ENTMCNC: 31.7 G/DL (ref 31.4–37.4)
MCHC RBC AUTO-ENTMCNC: 31.9 G/DL (ref 31.4–37.4)
MCV RBC AUTO: 89 FL (ref 82–98)
MCV RBC AUTO: 89 FL (ref 82–98)
MONOCYTES # BLD AUTO: 2.02 THOUSAND/ΜL (ref 0.17–1.22)
MONOCYTES NFR BLD AUTO: 14 % (ref 4–12)
NEUTROPHILS # BLD AUTO: 11.02 THOUSANDS/ΜL (ref 1.85–7.62)
NEUTS SEG NFR BLD AUTO: 76 % (ref 43–75)
NRBC BLD AUTO-RTO: 0 /100 WBCS
PLATELET # BLD AUTO: 197 THOUSANDS/UL (ref 149–390)
PLATELET # BLD AUTO: 200 THOUSANDS/UL (ref 149–390)
PMV BLD AUTO: 9.5 FL (ref 8.9–12.7)
PMV BLD AUTO: 9.6 FL (ref 8.9–12.7)
POTASSIUM SERPL-SCNC: 4.4 MMOL/L (ref 3.5–5.3)
RBC # BLD AUTO: 3.57 MILLION/UL (ref 3.81–5.12)
RBC # BLD AUTO: 3.61 MILLION/UL (ref 3.81–5.12)
SODIUM SERPL-SCNC: 140 MMOL/L (ref 136–145)
WBC # BLD AUTO: 14.38 THOUSAND/UL (ref 4.31–10.16)
WBC # BLD AUTO: 14.51 THOUSAND/UL (ref 4.31–10.16)

## 2018-11-24 PROCEDURE — 85025 COMPLETE CBC W/AUTO DIFF WBC: CPT | Performed by: OBSTETRICS & GYNECOLOGY

## 2018-11-24 PROCEDURE — 99024 POSTOP FOLLOW-UP VISIT: CPT | Performed by: OBSTETRICS & GYNECOLOGY

## 2018-11-24 PROCEDURE — 85027 COMPLETE CBC AUTOMATED: CPT | Performed by: OBSTETRICS & GYNECOLOGY

## 2018-11-24 PROCEDURE — 83735 ASSAY OF MAGNESIUM: CPT | Performed by: OBSTETRICS & GYNECOLOGY

## 2018-11-24 PROCEDURE — 80048 BASIC METABOLIC PNL TOTAL CA: CPT | Performed by: OBSTETRICS & GYNECOLOGY

## 2018-11-24 RX ORDER — HEPARIN SODIUM 10000 [USP'U]/100ML
3-30 INJECTION, SOLUTION INTRAVENOUS
Status: DISCONTINUED | OUTPATIENT
Start: 2018-11-24 | End: 2018-11-27

## 2018-11-24 RX ADMIN — METRONIDAZOLE 500 MG: 500 INJECTION, SOLUTION INTRAVENOUS at 11:30

## 2018-11-24 RX ADMIN — CEFAZOLIN SODIUM 2000 MG: 2 SOLUTION INTRAVENOUS at 05:43

## 2018-11-24 RX ADMIN — HEPARIN SODIUM 18 UNITS/KG/HR: 10000 INJECTION, SOLUTION INTRAVENOUS at 17:50

## 2018-11-24 RX ADMIN — CEFAZOLIN SODIUM 2000 MG: 2 SOLUTION INTRAVENOUS at 15:00

## 2018-11-24 RX ADMIN — DEXTROSE, SODIUM CHLORIDE, AND POTASSIUM CHLORIDE 125 ML/HR: 5; .45; .15 INJECTION INTRAVENOUS at 19:07

## 2018-11-24 RX ADMIN — CEFAZOLIN SODIUM 2000 MG: 2 SOLUTION INTRAVENOUS at 21:14

## 2018-11-24 RX ADMIN — METRONIDAZOLE 500 MG: 500 INJECTION, SOLUTION INTRAVENOUS at 04:18

## 2018-11-24 RX ADMIN — METRONIDAZOLE 500 MG: 500 INJECTION, SOLUTION INTRAVENOUS at 17:28

## 2018-11-24 NOTE — PROGRESS NOTES
Gyn Oncology Progress note   Chica Jaramillo 77 y o  female MRN: [id number]  Unit/Bed#: [facility] 622-01 Encounter: [id number]    Chica Jaramillo has no current complaints  Pain is present - adequately treated  Patient is currently voiding with a aranda catheter  She is not ambulating  Patient is not currently passing flatus and has had no bowel movement  Denies nausea or vomitting  Patient denies fever, chills, chest pain, shortness of breath, or calf tenderness  /70   Pulse 83   Temp (!) 97 3 °F (36 3 °C)   Resp 18   Ht 5' 2" (1 575 m)   Wt 75 3 kg (166 lb)   SpO2 100%   BMI 30 36 kg/m²     I/O last 3 completed shifts: In: 2585 4 [I V :2335 4; IV Piggyback:250]  Out: 460 [Urine:450; Drains:80; Blood:300]  I/O this shift: In: 0   Out: 800 [Urine:750; Drains:50]    Lab Results   Component Value Date    WBC 15 14 (H) 11/23/2018    HGB 10 7 (L) 11/23/2018    HCT 34 3 (L) 11/23/2018    MCV 90 11/23/2018     11/23/2018       Lab Results   Component Value Date    CALCIUM 8 7 11/14/2018    K 3 6 11/14/2018    CO2 27 11/14/2018     11/14/2018    BUN 21 11/14/2018    CREATININE 0 76 11/14/2018       Magnesium   Date Value Ref Range Status   11/14/2018 1 9 1 6 - 2 6 mg/dL Final       Lab Results   Component Value Date/Time    POCGLU 155 (H) 11/23/2018 03:56 PM    POCGLU 92 09/18/2018 11:25 AM       Physical Exam  Gen: AAOx3, NAD, comfortable in bed   CVS:  RRR, no murmurs or gallops  Lungs: CTA B/L, no rales or rhonchi,  normal respiratory effort and rate  Abdomen: soft, appropriatelly tender, incision C/D/I  Extremities: SCDs on and on, non tender, no edema, negative Homans       A/P: 77 y o   s/p dx lap, abdominal radical hysterectomy, BSO, omentectomy, rectosigmoid resection, POD#1  Ovarian Ca : s/p surgery, f/u path, f/u AM labs, Hb 10 7 yesterday after sx from 11 on the 14th of this month, f/u AM labs  Rectosigmoid Resection w/ anastomosis: cont Flagyl and Ancef x 3d  Pain:  Well controlled, s/p TAPs block, Dilaudid PCA  FEN: NPO, D5 1/2NS+20K, possible NGT DC today  DVT: IVC filter, heparin gtt to start today after labs

## 2018-11-24 NOTE — PROGRESS NOTES
Post Op Note  Aurelia Mujica 77 y o  female MRN: [id number]  Unit/Bed#: [facility] 622-01 Encounter: [id number]    77 y o   s/p dx lap, abdominal radical hysterectomy, BSO, omentectomy, rectosigmoid resection  Doing well, pain well controlled, NG tube in place, denies nausea or vomiting  Voiding with a aranda  No CP or SOB, no leg pain  /75   Pulse 98   Temp 97 8 °F (36 6 °C)   Resp 14   Ht 5' 2" (1 575 m)   Wt 75 3 kg (166 lb)   SpO2 100%   BMI 30 36 kg/m²     No intake/output data recorded  Lab Results   Component Value Date    WBC 8 28 11/14/2018    HGB 11 0 (L) 11/14/2018    HCT 35 3 11/14/2018    MCV 89 11/14/2018     11/14/2018       Lab Results   Component Value Date    CALCIUM 8 7 11/14/2018    K 3 6 11/14/2018    CO2 27 11/14/2018     11/14/2018    BUN 21 11/14/2018    CREATININE 0 76 11/14/2018       Physical Exam   Constitutional: She is oriented to person, place, and time  No acute distress  Heart: Regular rate & rhythm  No murmurs/clicks/gallops  Lungs: Clear bilaterally  Normal effort  No wheezes/rales/rhonchi  Abdominal: Soft  There is Mild tenderness, without rebound or guarding  She is Non-distended  Bowel sounds are scant  Extremities: No TTP  Lower limbs symmetric bilaterally       A/P: 77 y o   s/p dx lap, abdominal radical hysterectomy, BSO, omentectomy, rectosigmoid resection   Ovarian Ca : s/p surgery, f/u path, f/u AM labs  Rectosigmoid Resection w/ anastomosis: Flagyl and Ancef x 3d  Pain:  Well controlled, s/p TAPs block, Dilaudid PCA  FEN: NPO, D5 1/2NS+20K  DVT: IVC filter, heparin gtt to start on 11/24

## 2018-11-25 LAB
APTT PPP: 104 SECONDS (ref 26–38)
APTT PPP: 109 SECONDS (ref 26–38)
APTT PPP: 110 SECONDS (ref 26–38)
APTT PPP: 45 SECONDS (ref 26–38)
APTT PPP: 85 SECONDS (ref 26–38)
ERYTHROCYTE [DISTWIDTH] IN BLOOD BY AUTOMATED COUNT: 20.2 % (ref 11.6–15.1)
ERYTHROCYTE [DISTWIDTH] IN BLOOD BY AUTOMATED COUNT: 20.5 % (ref 11.6–15.1)
ERYTHROCYTE [DISTWIDTH] IN BLOOD BY AUTOMATED COUNT: 20.6 % (ref 11.6–15.1)
ERYTHROCYTE [DISTWIDTH] IN BLOOD BY AUTOMATED COUNT: 20.8 % (ref 11.6–15.1)
HCT VFR BLD AUTO: 30.7 % (ref 34.8–46.1)
HCT VFR BLD AUTO: 31.1 % (ref 34.8–46.1)
HCT VFR BLD AUTO: 32 % (ref 34.8–46.1)
HCT VFR BLD AUTO: 32 % (ref 34.8–46.1)
HGB BLD-MCNC: 10 G/DL (ref 11.5–15.4)
HGB BLD-MCNC: 9.6 G/DL (ref 11.5–15.4)
MCH RBC QN AUTO: 28 PG (ref 26.8–34.3)
MCH RBC QN AUTO: 28 PG (ref 26.8–34.3)
MCH RBC QN AUTO: 28.1 PG (ref 26.8–34.3)
MCH RBC QN AUTO: 28.7 PG (ref 26.8–34.3)
MCHC RBC AUTO-ENTMCNC: 31.3 G/DL (ref 31.4–37.4)
MCHC RBC AUTO-ENTMCNC: 32.2 G/DL (ref 31.4–37.4)
MCV RBC AUTO: 89 FL (ref 82–98)
MCV RBC AUTO: 90 FL (ref 82–98)
PLATELET # BLD AUTO: 178 THOUSANDS/UL (ref 149–390)
PLATELET # BLD AUTO: 183 THOUSANDS/UL (ref 149–390)
PLATELET # BLD AUTO: 190 THOUSANDS/UL (ref 149–390)
PLATELET # BLD AUTO: 192 THOUSANDS/UL (ref 149–390)
PMV BLD AUTO: 10.1 FL (ref 8.9–12.7)
PMV BLD AUTO: 9.4 FL (ref 8.9–12.7)
PMV BLD AUTO: 9.4 FL (ref 8.9–12.7)
PMV BLD AUTO: 9.5 FL (ref 8.9–12.7)
RBC # BLD AUTO: 3.43 MILLION/UL (ref 3.81–5.12)
RBC # BLD AUTO: 3.49 MILLION/UL (ref 3.81–5.12)
RBC # BLD AUTO: 3.56 MILLION/UL (ref 3.81–5.12)
RBC # BLD AUTO: 3.57 MILLION/UL (ref 3.81–5.12)
WBC # BLD AUTO: 14.14 THOUSAND/UL (ref 4.31–10.16)
WBC # BLD AUTO: 14.31 THOUSAND/UL (ref 4.31–10.16)
WBC # BLD AUTO: 16.41 THOUSAND/UL (ref 4.31–10.16)
WBC # BLD AUTO: 16.96 THOUSAND/UL (ref 4.31–10.16)

## 2018-11-25 PROCEDURE — 99024 POSTOP FOLLOW-UP VISIT: CPT | Performed by: OBSTETRICS & GYNECOLOGY

## 2018-11-25 PROCEDURE — 85027 COMPLETE CBC AUTOMATED: CPT | Performed by: OBSTETRICS & GYNECOLOGY

## 2018-11-25 PROCEDURE — 85730 THROMBOPLASTIN TIME PARTIAL: CPT | Performed by: OBSTETRICS & GYNECOLOGY

## 2018-11-25 RX ADMIN — METRONIDAZOLE 500 MG: 500 INJECTION, SOLUTION INTRAVENOUS at 18:21

## 2018-11-25 RX ADMIN — METRONIDAZOLE 500 MG: 500 INJECTION, SOLUTION INTRAVENOUS at 10:30

## 2018-11-25 RX ADMIN — DEXTROSE, SODIUM CHLORIDE, AND POTASSIUM CHLORIDE 100 ML/HR: 5; .45; .15 INJECTION INTRAVENOUS at 20:56

## 2018-11-25 RX ADMIN — HEPARIN SODIUM 20 UNITS/KG/HR: 10000 INJECTION, SOLUTION INTRAVENOUS at 10:36

## 2018-11-25 RX ADMIN — SODIUM CHLORIDE: 9 INJECTION INTRAMUSCULAR; INTRAVENOUS; SUBCUTANEOUS at 16:14

## 2018-11-25 RX ADMIN — DEXTROSE, SODIUM CHLORIDE, AND POTASSIUM CHLORIDE 125 ML/HR: 5; .45; .15 INJECTION INTRAVENOUS at 02:54

## 2018-11-25 RX ADMIN — DEXTROSE, SODIUM CHLORIDE, AND POTASSIUM CHLORIDE 100 ML/HR: 5; .45; .15 INJECTION INTRAVENOUS at 10:32

## 2018-11-25 RX ADMIN — CEFAZOLIN SODIUM 2000 MG: 2 SOLUTION INTRAVENOUS at 22:08

## 2018-11-25 RX ADMIN — METRONIDAZOLE 500 MG: 500 INJECTION, SOLUTION INTRAVENOUS at 02:54

## 2018-11-25 RX ADMIN — CEFAZOLIN SODIUM 2000 MG: 2 SOLUTION INTRAVENOUS at 05:45

## 2018-11-25 RX ADMIN — CEFAZOLIN SODIUM 2000 MG: 2 SOLUTION INTRAVENOUS at 13:12

## 2018-11-25 NOTE — PROGRESS NOTES
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 POD#2 s/p dx lap, ex laparotomy, modified radical hysterectomy, BSO, rectosigmoid resection with end-to-end reanastomosis, omentectomy, and optimal tumor debulking  1  Ovarian cancer  - s/p surgery  - f/u pathology  - s/p neoadjuvant chemotherapy  2  Post-op care  - Dilaudid PCA  - s/p NGT, advancing diet as of yesterday  - Bishop catheter to d/c this morning  - Antibiotics (ancef/flagyl) x72h secondary to stool spillage  - Encourage ambulation   3  DVT  - IVC filter in place  - Therapeutic hepatin gtt (titrating to therapeutic now)  - APTT/CBC q6hrs until therapeutic levels achieved  4  FEN  - Clears  - IVF  5  VTE ppx  - IVC filter  - Therapeutic heparin        Subjective:  No acute events overnight  Tolerated clears well through the day, but feels that its sitting in her stomach now  Pain is well controlled  Pt is not passing flatus  Pt is not ambulating, but got OOB to chair yesterday  Denies fevers/chills, cp, sob, n/v/d, or leg pain  Objective:  Temp:  [97 88 °F (36 6 °C)-98 78 °F (37 1 °C)] 97 88 °F (36 6 °C)  HR:  [] 105  Resp:  [17-20] 17  BP: (115-154)/(72-79) 145/79     I/O last 3 completed shifts: In: 3130 5 [P O :540; I V :2340 5;  IV Piggyback:250]  Out: 1489 [Urine:1775; Drains:170; Blood:300]  I/O this shift:  In: 1341 6 [P O :340; I V :1001 6]  Out: 2220 [Urine:2150; Drains:70]    Lab Results   Component Value Date    WBC 14 31 (H) 11/25/2018    HGB 9 6 (L) 11/25/2018    HCT 30 7 (L) 11/25/2018    MCV 90 11/25/2018     11/25/2018       Lab Results   Component Value Date    CALCIUM 8 1 (L) 11/24/2018    K 4 4 11/24/2018    CO2 26 11/24/2018     11/24/2018    BUN 14 11/24/2018    CREATININE 0 65 11/24/2018       Lab Results   Component Value Date/Time    POCGLU 155 (H) 11/23/2018 03:56 PM POCGLU 92 09/18/2018 11:25 AM       Physical Exam  Physical Exam   Constitutional: She is oriented to person, place, and time  She appears well-developed and well-nourished  No distress  HENT:   Head: Normocephalic and atraumatic  Eyes: No scleral icterus  Cardiovascular: Normal rate  Pulmonary/Chest: Effort normal  No accessory muscle usage  No respiratory distress  Abdominal: Soft  She exhibits no distension  There is no tenderness  There is no rebound and no guarding  Midline incision clean, dry, and intact  No erythema or drainage  Drain site clean and dry, no erythema  Draining serosang  drainage   Musculoskeletal: She exhibits no edema or tenderness (LE non-tender to palpation bilaterally)  Neurological: She is alert and oriented to person, place, and time  Skin: Skin is warm and dry  No rash noted  No erythema  Psychiatric: She has a normal mood and affect   Her behavior is normal            Ashkan Davis MD   OB/GYN PGY-4  11/25/2018 5:33 AM

## 2018-11-25 NOTE — PROGRESS NOTES
Patient resting comfortably at this time  No complaints of CP or SOB  Bishop emptied and PCA setting verified  Call bell and personal care items within reach  Will continue to monitor

## 2018-11-25 NOTE — SOCIAL WORK
Pt stated she lives alone in 1 story house, first floor setup  Pt was driving, independent in ADLs PTA  Denies DME  No hx HHC  Denies rehab  No hx inpatient MH/D&A treatment  Emergency contact is pt's brother Mable Carr [phone]  CM reviewed discharge checklist and left copy with pt at bedside  Pt encouraged to participate in d/c planning prior to d/c     CM reviewed d/c planning process including the following: identifying help at home, patient preference for d/c planning needs, Discharge Lounge, [facility] Meds to Bed program, availability of treatment team to discuss questions or concerns patient and/or family may have regarding understanding medications and recognizing signs and symptoms once discharged  CM also encouraged patient to follow up with all recommended appointments after discharge  Patient advised of importance for patient and family to participate in managing patients medical well being

## 2018-11-26 LAB
ABO GROUP BLD BPU: NORMAL
ABO GROUP BLD BPU: NORMAL
APTT PPP: 36 SECONDS (ref 26–38)
APTT PPP: 88 SECONDS (ref 26–38)
BPU ID: NORMAL
BPU ID: NORMAL
CROSSMATCH: NORMAL
CROSSMATCH: NORMAL
ERYTHROCYTE [DISTWIDTH] IN BLOOD BY AUTOMATED COUNT: 19.9 % (ref 11.6–15.1)
ERYTHROCYTE [DISTWIDTH] IN BLOOD BY AUTOMATED COUNT: 20 % (ref 11.6–15.1)
HCT VFR BLD AUTO: 28.2 % (ref 34.8–46.1)
HCT VFR BLD AUTO: 28.9 % (ref 34.8–46.1)
HGB BLD-MCNC: 8.8 G/DL (ref 11.5–15.4)
HGB BLD-MCNC: 8.9 G/DL (ref 11.5–15.4)
MCH RBC QN AUTO: 27.6 PG (ref 26.8–34.3)
MCH RBC QN AUTO: 27.9 PG (ref 26.8–34.3)
MCHC RBC AUTO-ENTMCNC: 30.8 G/DL (ref 31.4–37.4)
MCHC RBC AUTO-ENTMCNC: 31.2 G/DL (ref 31.4–37.4)
MCV RBC AUTO: 90 FL (ref 82–98)
MCV RBC AUTO: 90 FL (ref 82–98)
PLATELET # BLD AUTO: 168 THOUSANDS/UL (ref 149–390)
PLATELET # BLD AUTO: 177 THOUSANDS/UL (ref 149–390)
PMV BLD AUTO: 9.5 FL (ref 8.9–12.7)
PMV BLD AUTO: 9.8 FL (ref 8.9–12.7)
RBC # BLD AUTO: 3.15 MILLION/UL (ref 3.81–5.12)
RBC # BLD AUTO: 3.22 MILLION/UL (ref 3.81–5.12)
UNIT DISPENSE STATUS: NORMAL
UNIT DISPENSE STATUS: NORMAL
UNIT PRODUCT CODE: NORMAL
UNIT PRODUCT CODE: NORMAL
UNIT RH: NORMAL
UNIT RH: NORMAL
WBC # BLD AUTO: 13.16 THOUSAND/UL (ref 4.31–10.16)
WBC # BLD AUTO: 14.03 THOUSAND/UL (ref 4.31–10.16)

## 2018-11-26 PROCEDURE — 99024 POSTOP FOLLOW-UP VISIT: CPT | Performed by: OBSTETRICS & GYNECOLOGY

## 2018-11-26 PROCEDURE — 85027 COMPLETE CBC AUTOMATED: CPT | Performed by: OBSTETRICS & GYNECOLOGY

## 2018-11-26 PROCEDURE — 85730 THROMBOPLASTIN TIME PARTIAL: CPT | Performed by: OBSTETRICS & GYNECOLOGY

## 2018-11-26 RX ADMIN — METRONIDAZOLE 500 MG: 500 INJECTION, SOLUTION INTRAVENOUS at 10:38

## 2018-11-26 RX ADMIN — HEPARIN SODIUM 16 UNITS/KG/HR: 10000 INJECTION, SOLUTION INTRAVENOUS at 05:26

## 2018-11-26 RX ADMIN — DEXTROSE, SODIUM CHLORIDE, AND POTASSIUM CHLORIDE 100 ML/HR: 5; .45; .15 INJECTION INTRAVENOUS at 05:26

## 2018-11-26 RX ADMIN — CEFAZOLIN SODIUM 2000 MG: 2 SOLUTION INTRAVENOUS at 05:26

## 2018-11-26 RX ADMIN — CEFAZOLIN SODIUM 2000 MG: 2 SOLUTION INTRAVENOUS at 15:47

## 2018-11-26 RX ADMIN — METRONIDAZOLE 500 MG: 500 INJECTION, SOLUTION INTRAVENOUS at 02:21

## 2018-11-26 NOTE — PROGRESS NOTES
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 POD#3 s/p dx lap, ex laparotomy, modified radical hysterectomy, BSO, rectosigmoid resection with end-to-end reanastomosis, omentectomy, and optimal tumor debulking  PLAN:  1  Ovarian cancer  - s/p surgery  - follow-up final pathology  - s/p neoadjuvant chemotherapy  2  Post-op care  - Dilaudid PCA, consider transition to IV vs oral pain medication  - s/p NGT, continue clear diet  - Antibiotics (ancef/flagyl) x72h secondary to stool spillage, plan to discontinue later this evening   - Encourage ambulation   3  DVT  - IVC filter in place  - Therapeutic hepatin gtt, therapeutic levels achieved, for transition to Xarelto  - trending APTT/CBC q6hrs  4  FEN  - continue clear liquid diet   - IVF  5  VTE ppx  - IVC filter  - Therapeutic heparin     ____________________    SUBJECTIVE  History of Present Illness:  Overnight: without acute events overnight  Pain: using PCA minimally overnight  Tolerating Oral Intake: tolerating clear liquid diet  Voiding: yes  Flatus: no  Bowel Movement: no  Ambulating: yes  Chest Pain: no  Shortness of Breath: no  Leg Pain/Discomfort: no    OBJECTIVE  Vitals  Temp:  [98 1 °F (36 7 °C)-98 8 °F (37 1 °C)] 98 6 °F (37 °C)  HR:  [] 94  Resp:  [16-19] 19  BP: (138-153)/(76-81) 146/76       Intake/Output Summary (Last 24 hours) at 11/26/18 9248  Last data filed at 11/26/18 0532   Gross per 24 hour   Intake          2677 47 ml   Output             2165 ml   Net           512 47 ml       Physical Exam:   Physical Exam   Constitutional: She is oriented to person, place, and time  She appears well-developed and well-nourished  No distress  Cardiovascular: Normal rate, regular rhythm, normal heart sounds and intact distal pulses  Pulmonary/Chest: Effort normal and breath sounds normal  No respiratory distress  She has no wheezes  Abdominal: Soft  Bowel sounds are normal  She exhibits no distension  There is no tenderness  Midline incision clean, dry, and intact  JESSI drain with serosanguinous output     Musculoskeletal: Normal range of motion  She exhibits no edema, tenderness or deformity  Neurological: She is alert and oriented to person, place, and time  Skin: Skin is warm and dry  She is not diaphoretic  Labs:   Recent Results (from the past 72 hour(s))   Type and screen    Collection Time: 11/23/18  7:21 AM   Result Value Ref Range    ABO Grouping A     Rh Factor Positive     Antibody Screen Negative     Specimen Expiration Date [id number]    Prepare RBC:Special Requirements: None; Has consent been obtained? Yes; Where is the Surgery Scheduled?  [facility],  Units    Collection Time: 11/23/18 12:25 PM   Result Value Ref Range    Unit Product Code V4405V44     Unit Number [id number]     Unit ABO A     Unit [facility] POS     Crossmatch Compatible     Unit Dispense Status Crossmatched     Unit Product Code J1218V60     Unit Number [id number]     Unit ABO A     Unit [facility] POS     Crossmatch Compatible     Unit Dispense Status Crossmatched    Fingerstick Glucose (POCT)    Collection Time: 11/23/18  3:56 PM   Result Value Ref Range    POC Glucose 155 (H) 65 - 140 mg/dl   CBC and differential    Collection Time: 11/23/18  8:30 PM   Result Value Ref Range    WBC 15 14 (H) 4 31 - 10 16 Thousand/uL    RBC 3 82 3 81 - 5 12 Million/uL    Hemoglobin 10 7 (L) 11 5 - 15 4 g/dL    Hematocrit 34 3 (L) 34 8 - 46 1 %    MCV 90 82 - 98 fL    MCH 28 0 26 8 - 34 3 pg    MCHC 31 2 (L) 31 4 - 37 4 g/dL    RDW 20 4 (H) 11 6 - 15 1 %    MPV 9 2 8 9 - 12 7 fL    Platelets 110 402 - 087 Thousands/uL    nRBC 0 /100 WBCs    Neutrophils Relative 88 (H) 43 - 75 %    Immat GRANS % 1 0 - 2 %    Lymphocytes Relative 3 (L) 14 - 44 %    Monocytes Relative 8 4 - 12 %    Eosinophils Relative 0 0 - 6 %    Basophils Relative 0 0 - 1 %    Neutrophils Absolute 13 45 (H) 1 85 - 7 62 Thousands/µL    Immature Grans Absolute 0 07 0 00 - 0 20 Thousand/uL    Lymphocytes Absolute 0 45 (L) 0 60 - 4 47 Thousands/µL    Monocytes Absolute 1 16 0 17 - 1 22 Thousand/µL    Eosinophils Absolute 0 00 0 00 - 0 61 Thousand/µL    Basophils Absolute 0 01 0 00 - 0 10 Thousands/µL   Basic metabolic panel    Collection Time: 11/24/18  5:15 AM   Result Value Ref Range    Sodium 140 136 - 145 mmol/L    Potassium 4 4 3 5 - 5 3 mmol/L    Chloride 108 100 - 108 mmol/L    CO2 26 21 - 32 mmol/L    ANION GAP 6 4 - 13 mmol/L    BUN 14 5 - 25 mg/dL    Creatinine 0 65 0 60 - 1 30 mg/dL    Glucose 137 65 - 140 mg/dL    Calcium 8 1 (L) 8 3 - 10 1 mg/dL    eGFR 85 ml/min/1 73sq m   Magnesium    Collection Time: 11/24/18  5:15 AM   Result Value Ref Range    Magnesium 1 9 1 6 - 2 6 mg/dL   CBC and differential    Collection Time: 11/24/18  5:15 AM   Result Value Ref Range    WBC 14 51 (H) 4 31 - 10 16 Thousand/uL    RBC 3 57 (L) 3 81 - 5 12 Million/uL    Hemoglobin 10 1 (L) 11 5 - 15 4 g/dL    Hematocrit 31 7 (L) 34 8 - 46 1 %    MCV 89 82 - 98 fL    MCH 28 3 26 8 - 34 3 pg    MCHC 31 9 31 4 - 37 4 g/dL    RDW 20 7 (H) 11 6 - 15 1 %    MPV 9 6 8 9 - 12 7 fL    Platelets 950 842 - 760 Thousands/uL    nRBC 0 /100 WBCs    Neutrophils Relative 76 (H) 43 - 75 %    Immat GRANS % 0 0 - 2 %    Lymphocytes Relative 10 (L) 14 - 44 %    Monocytes Relative 14 (H) 4 - 12 %    Eosinophils Relative 0 0 - 6 %    Basophils Relative 0 0 - 1 %    Neutrophils Absolute 11 02 (H) 1 85 - 7 62 Thousands/µL    Immature Grans Absolute 0 06 0 00 - 0 20 Thousand/uL    Lymphocytes Absolute 1 39 0 60 - 4 47 Thousands/µL    Monocytes Absolute 2 02 (H) 0 17 - 1 22 Thousand/µL    Eosinophils Absolute 0 01 0 00 - 0 61 Thousand/µL    Basophils Absolute 0 01 0 00 - 0 10 Thousands/µL   CBC and Platelet    Collection Time: 11/24/18  4:04 PM   Result Value Ref Range    WBC 14 38 (H) 4 31 - 10 16 Thousand/uL    RBC 3 61 (L) 3 81 - 5 12 Million/uL    Hemoglobin 10 2 (L) 11 5 - 15 4 g/dL    Hematocrit 32 2 (L) 34 8 - 46 1 %    MCV 89 82 - 98 fL    MCH 28 3 26 8 - 34 3 pg    MCHC 31 7 31 4 - 37 4 g/dL    RDW 20 9 (H) 11 6 - 15 1 %    Platelets 939 884 - 420 Thousands/uL    MPV 9 5 8 9 - 12 7 fL   APTT    Collection Time: 11/25/18 12:40 AM   Result Value Ref Range    PTT 45 (H) 26 - 38 seconds   CBC    Collection Time: 11/25/18 12:40 AM   Result Value Ref Range    WBC 14 31 (H) 4 31 - 10 16 Thousand/uL    RBC 3 43 (L) 3 81 - 5 12 Million/uL    Hemoglobin 9 6 (L) 11 5 - 15 4 g/dL    Hematocrit 30 7 (L) 34 8 - 46 1 %    MCV 90 82 - 98 fL    MCH 28 0 26 8 - 34 3 pg    MCHC 31 3 (L) 31 4 - 37 4 g/dL    RDW 20 6 (H) 11 6 - 15 1 %    Platelets 804 288 - 618 Thousands/uL    MPV 9 4 8 9 - 12 7 fL   APTT    Collection Time: 11/25/18  6:28 AM   Result Value Ref Range    PTT 85 (H) 26 - 38 seconds   CBC    Collection Time: 11/25/18  6:28 AM   Result Value Ref Range    WBC 14 14 (H) 4 31 - 10 16 Thousand/uL    RBC 3 49 (L) 3 81 - 5 12 Million/uL    Hemoglobin 10 0 (L) 11 5 - 15 4 g/dL    Hematocrit 31 1 (L) 34 8 - 46 1 %    MCV 89 82 - 98 fL    MCH 28 7 26 8 - 34 3 pg    MCHC 32 2 31 4 - 37 4 g/dL    RDW 20 8 (H) 11 6 - 15 1 %    Platelets 065 835 - 264 Thousands/uL    MPV 10 1 8 9 - 12 7 fL   APTT    Collection Time: 11/25/18 12:51 PM   Result Value Ref Range     (H) 26 - 38 seconds   CBC    Collection Time: 11/25/18 12:52 PM   Result Value Ref Range    WBC 16 96 (H) 4 31 - 10 16 Thousand/uL    RBC 3 56 (L) 3 81 - 5 12 Million/uL    Hemoglobin 10 0 (L) 11 5 - 15 4 g/dL    Hematocrit 32 0 (L) 34 8 - 46 1 %    MCV 90 82 - 98 fL    MCH 28 1 26 8 - 34 3 pg    MCHC 31 3 (L) 31 4 - 37 4 g/dL    RDW 20 5 (H) 11 6 - 15 1 %    Platelets 020 919 - 639 Thousands/uL    MPV 9 4 8 9 - 12 7 fL   APTT    Collection Time: 11/25/18  6:15 PM   Result Value Ref Range     (H) 26 - 38 seconds   CBC    Collection Time: 11/25/18  6:15 PM   Result Value Ref Range    WBC 16 41 (H) 4 31 - 10 16 Thousand/uL    RBC 3 57 (L) 3 81 - 5 12 Million/uL    Hemoglobin 10 0 (L) 11 5 - 15 4 g/dL    Hematocrit 32 0 (L) 34 8 - 46 1 %    MCV 90 82 - 98 fL    MCH 28 0 26 8 - 34 3 pg    MCHC 31 3 (L) 31 4 - 37 4 g/dL    RDW 20 2 (H) 11 6 - 15 1 %    Platelets 495 672 - 859 Thousands/uL    MPV 9 5 8 9 - 12 7 fL   APTT    Collection Time: 11/25/18  9:31 PM   Result Value Ref Range     (H) 26 - 38 seconds   APTT    Collection Time: 11/26/18  4:50 AM   Result Value Ref Range    PTT 88 (H) 26 - 38 seconds   CBC    Collection Time: 11/26/18  4:50 AM   Result Value Ref Range    WBC 14 03 (H) 4 31 - 10 16 Thousand/uL    RBC 3 22 (L) 3 81 - 5 12 Million/uL    Hemoglobin 8 9 (L) 11 5 - 15 4 g/dL    Hematocrit 28 9 (L) 34 8 - 46 1 %    MCV 90 82 - 98 fL    MCH 27 6 26 8 - 34 3 pg    MCHC 30 8 (L) 31 4 - 37 4 g/dL    RDW 20 0 (H) 11 6 - 15 1 %    Platelets 527 200 - 900 Thousands/uL    MPV 9 5 8 9 - 12 7 fL       Meds:  Scheduled Meds:  Current Facility-Administered Medications:  acetaminophen 650 mg Oral Q6H PRN Mariella Burt MD    cefazolin 2,000 mg Intravenous Q8H Mariella Burt MD Last Rate: 2,000 mg (11/26/18 0526)   dextrose 5 % and sodium chloride 0 45 % with KCl 20 mEq/L 100 mL/hr Intravenous Continuous Julius Hernandez MD Last Rate: 100 mL/hr (11/26/18 0526)   heparin (porcine) 3-30 Units/kg/hr (Order-Specific) Intravenous Titrated Ilya Ballard MD Last Rate: 16 Units/kg/hr (11/26/18 0526)   HYDROmorphone  Intravenous Continuous Mariella Burt MD    lactated ringers 75 mL/hr Intravenous Continuous Gregg Alexandra MD Last Rate: Stopped (11/23/18 1722)   metroNIDAZOLE 500 mg Intravenous Q8H Mariella Burt MD Last Rate: 500 mg (11/26/18 0221)   ondansetron 4 mg Intravenous Q6H PRN Mariella Burt MD      Continuous Infusions:  dextrose 5 % and sodium chloride 0 45 % with KCl 20 mEq/L 100 mL/hr Last Rate: 100 mL/hr (11/26/18 05)   heparin (porcine) 3-30 Units/kg/hr (Order-Specific) Last Rate: 16 Units/kg/hr (11/26/18 8542)   HYDROmorphone     lactated ringers 75 mL/hr Last Rate: Stopped (11/23/18 1952)     PRN Meds:   acetaminophen    ondansetron    Imaging Studies: I have personally reviewed pertinent reports  EKG, Pathology, Microbiology, and Other Studies: I have personally reviewed pertinent reports        __________________    Signature / Title: Jennifer Ruiz DO, Ob/Gyn, PGY-2  Date: 11/26/2018  Time: 6:19 AM

## 2018-11-26 NOTE — UTILIZATION REVIEW
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 ovarian cancer     Anesthesia:  General     Admission Orders: Date/Time/Statement: 11/23/18 @ 1451     Orders Placed This Encounter   Procedures    Inpatient Admission     Standing Status:   Standing     Number of Occurrences:   1     Order Specific Question:   Admitting Physician     Answer:   Inder Delgado [[id number]]     Order Specific Question:   Level of Care     Answer:   Med Surg [[id number]]     Order Specific Question:   Estimated length of stay     Answer:   More than 2 Midnights     Order Specific Question:   Certification     Answer:   I certify that inpatient services are medically necessary for this patient for a duration of greater than two midnights  See H&P and MD Progress Notes for additional information about the patient's course of treatment         Vital Signs: /76 (BP Location: Right arm)   Pulse 97   Temp 99 1 °F (37 3 °C) (Oral)   Resp 16   Ht 5' 2" (1 575 m)   Wt 75 3 kg (166 lb)   SpO2 94%   BMI 30 36 kg/m²     Scheduled Meds:  Current Facility-Administered Medications:  acetaminophen 650 mg Oral Q6H PRN    cefazolin 2,000 mg Intravenous Q8H    ondansetron 4 mg Intravenous Q6H PRN      Continuous Infusions:  dextrose 5 % and sodium chloride 0 45 % with KCl 20 mEq/L 100 mL/hr   heparin (porcine) 3-30 Units/kg/hr (Order-Specific)   HYDROmorphone    lactated ringers 75 mL/hr     Nursing orders - Post op care - VS q 4- Up & OOB as tolerated with assistance - I & O q shift - Diet Clear liquid s-  No carbonated beverages ( stated on 11/24)

## 2018-11-26 NOTE — PROGRESS NOTES
Advance diet to regular  She is tolerating clears and would like to eat some solid food  If concern for advancing past clears, consider advancement to clear liquids w/ toast and crackers, then if she tolerates, advance to regular  Will continue to monitor during hospital stay

## 2018-11-27 LAB
ANION GAP SERPL CALCULATED.3IONS-SCNC: 4 MMOL/L (ref 4–13)
APTT PPP: 30 SECONDS (ref 26–38)
BASOPHILS # BLD AUTO: 0.03 THOUSANDS/ΜL (ref 0–0.1)
BASOPHILS NFR BLD AUTO: 0 % (ref 0–1)
BUN SERPL-MCNC: 6 MG/DL (ref 5–25)
CALCIUM SERPL-MCNC: 9 MG/DL (ref 8.3–10.1)
CHLORIDE SERPL-SCNC: 103 MMOL/L (ref 100–108)
CO2 SERPL-SCNC: 30 MMOL/L (ref 21–32)
CREAT SERPL-MCNC: 0.53 MG/DL (ref 0.6–1.3)
EOSINOPHIL # BLD AUTO: 0.12 THOUSAND/ΜL (ref 0–0.61)
EOSINOPHIL NFR BLD AUTO: 1 % (ref 0–6)
ERYTHROCYTE [DISTWIDTH] IN BLOOD BY AUTOMATED COUNT: 19.6 % (ref 11.6–15.1)
ERYTHROCYTE [DISTWIDTH] IN BLOOD BY AUTOMATED COUNT: 19.7 % (ref 11.6–15.1)
ERYTHROCYTE [DISTWIDTH] IN BLOOD BY AUTOMATED COUNT: 19.7 % (ref 11.6–15.1)
ERYTHROCYTE [DISTWIDTH] IN BLOOD BY AUTOMATED COUNT: 19.8 % (ref 11.6–15.1)
ERYTHROCYTE [DISTWIDTH] IN BLOOD BY AUTOMATED COUNT: 19.8 % (ref 11.6–15.1)
GFR SERPL CREATININE-BSD FRML MDRD: 91 ML/MIN/1.73SQ M
GLUCOSE SERPL-MCNC: 115 MG/DL (ref 65–140)
HCT VFR BLD AUTO: 27.1 % (ref 34.8–46.1)
HCT VFR BLD AUTO: 27.2 % (ref 34.8–46.1)
HCT VFR BLD AUTO: 28 % (ref 34.8–46.1)
HCT VFR BLD AUTO: 28.8 % (ref 34.8–46.1)
HCT VFR BLD AUTO: 30.4 % (ref 34.8–46.1)
HGB BLD-MCNC: 8.5 G/DL (ref 11.5–15.4)
HGB BLD-MCNC: 8.6 G/DL (ref 11.5–15.4)
HGB BLD-MCNC: 8.8 G/DL (ref 11.5–15.4)
HGB BLD-MCNC: 9.1 G/DL (ref 11.5–15.4)
HGB BLD-MCNC: 9.8 G/DL (ref 11.5–15.4)
IMM GRANULOCYTES # BLD AUTO: 0.08 THOUSAND/UL (ref 0–0.2)
IMM GRANULOCYTES NFR BLD AUTO: 1 % (ref 0–2)
LYMPHOCYTES # BLD AUTO: 1.51 THOUSANDS/ΜL (ref 0.6–4.47)
LYMPHOCYTES NFR BLD AUTO: 15 % (ref 14–44)
MCH RBC QN AUTO: 27.7 PG (ref 26.8–34.3)
MCH RBC QN AUTO: 27.9 PG (ref 26.8–34.3)
MCH RBC QN AUTO: 27.9 PG (ref 26.8–34.3)
MCH RBC QN AUTO: 28.1 PG (ref 26.8–34.3)
MCH RBC QN AUTO: 28.7 PG (ref 26.8–34.3)
MCHC RBC AUTO-ENTMCNC: 31.3 G/DL (ref 31.4–37.4)
MCHC RBC AUTO-ENTMCNC: 31.4 G/DL (ref 31.4–37.4)
MCHC RBC AUTO-ENTMCNC: 31.6 G/DL (ref 31.4–37.4)
MCHC RBC AUTO-ENTMCNC: 31.7 G/DL (ref 31.4–37.4)
MCHC RBC AUTO-ENTMCNC: 32.2 G/DL (ref 31.4–37.4)
MCV RBC AUTO: 88 FL (ref 82–98)
MCV RBC AUTO: 89 FL (ref 82–98)
MONOCYTES # BLD AUTO: 1.53 THOUSAND/ΜL (ref 0.17–1.22)
MONOCYTES NFR BLD AUTO: 16 % (ref 4–12)
NEUTROPHILS # BLD AUTO: 6.56 THOUSANDS/ΜL (ref 1.85–7.62)
NEUTS SEG NFR BLD AUTO: 67 % (ref 43–75)
NRBC BLD AUTO-RTO: 0 /100 WBCS
PLATELET # BLD AUTO: 169 THOUSANDS/UL (ref 149–390)
PLATELET # BLD AUTO: 173 THOUSANDS/UL (ref 149–390)
PLATELET # BLD AUTO: 188 THOUSANDS/UL (ref 149–390)
PLATELET # BLD AUTO: 208 THOUSANDS/UL (ref 149–390)
PLATELET # BLD AUTO: 209 THOUSANDS/UL (ref 149–390)
PMV BLD AUTO: 10 FL (ref 8.9–12.7)
PMV BLD AUTO: 9.4 FL (ref 8.9–12.7)
PMV BLD AUTO: 9.4 FL (ref 8.9–12.7)
PMV BLD AUTO: 9.5 FL (ref 8.9–12.7)
PMV BLD AUTO: 9.6 FL (ref 8.9–12.7)
POTASSIUM SERPL-SCNC: 3.6 MMOL/L (ref 3.5–5.3)
RBC # BLD AUTO: 3.06 MILLION/UL (ref 3.81–5.12)
RBC # BLD AUTO: 3.07 MILLION/UL (ref 3.81–5.12)
RBC # BLD AUTO: 3.15 MILLION/UL (ref 3.81–5.12)
RBC # BLD AUTO: 3.26 MILLION/UL (ref 3.81–5.12)
RBC # BLD AUTO: 3.41 MILLION/UL (ref 3.81–5.12)
SODIUM SERPL-SCNC: 137 MMOL/L (ref 136–145)
WBC # BLD AUTO: 11.67 THOUSAND/UL (ref 4.31–10.16)
WBC # BLD AUTO: 12.04 THOUSAND/UL (ref 4.31–10.16)
WBC # BLD AUTO: 9.39 THOUSAND/UL (ref 4.31–10.16)
WBC # BLD AUTO: 9.83 THOUSAND/UL (ref 4.31–10.16)
WBC # BLD AUTO: 9.99 THOUSAND/UL (ref 4.31–10.16)

## 2018-11-27 PROCEDURE — 85027 COMPLETE CBC AUTOMATED: CPT | Performed by: OBSTETRICS & GYNECOLOGY

## 2018-11-27 PROCEDURE — 85730 THROMBOPLASTIN TIME PARTIAL: CPT | Performed by: OBSTETRICS & GYNECOLOGY

## 2018-11-27 PROCEDURE — 85025 COMPLETE CBC W/AUTO DIFF WBC: CPT | Performed by: OBSTETRICS & GYNECOLOGY

## 2018-11-27 PROCEDURE — 99024 POSTOP FOLLOW-UP VISIT: CPT | Performed by: OBSTETRICS & GYNECOLOGY

## 2018-11-27 PROCEDURE — 80048 BASIC METABOLIC PNL TOTAL CA: CPT | Performed by: OBSTETRICS & GYNECOLOGY

## 2018-11-27 RX ORDER — OXYCODONE HYDROCHLORIDE AND ACETAMINOPHEN 5; 325 MG/1; MG/1
1 TABLET ORAL EVERY 4 HOURS PRN
Status: DISCONTINUED | OUTPATIENT
Start: 2018-11-27 | End: 2018-11-27

## 2018-11-27 RX ORDER — HEPARIN SODIUM 5000 [USP'U]/ML
5000 INJECTION, SOLUTION INTRAVENOUS; SUBCUTANEOUS EVERY 8 HOURS SCHEDULED
Status: DISCONTINUED | OUTPATIENT
Start: 2018-11-27 | End: 2018-11-28

## 2018-11-27 RX ORDER — OXYCODONE HYDROCHLORIDE 5 MG/1
5 TABLET ORAL EVERY 4 HOURS PRN
Status: DISCONTINUED | OUTPATIENT
Start: 2018-11-27 | End: 2018-11-28 | Stop reason: HOSPADM

## 2018-11-27 RX ORDER — OXYCODONE HYDROCHLORIDE AND ACETAMINOPHEN 5; 325 MG/1; MG/1
2 TABLET ORAL EVERY 4 HOURS PRN
Status: DISCONTINUED | OUTPATIENT
Start: 2018-11-27 | End: 2018-11-27

## 2018-11-27 RX ORDER — OXYCODONE HYDROCHLORIDE 10 MG/1
10 TABLET ORAL EVERY 4 HOURS PRN
Status: DISCONTINUED | OUTPATIENT
Start: 2018-11-27 | End: 2018-11-28 | Stop reason: HOSPADM

## 2018-11-27 RX ORDER — IBUPROFEN 600 MG/1
600 TABLET ORAL EVERY 6 HOURS SCHEDULED
Status: DISCONTINUED | OUTPATIENT
Start: 2018-11-27 | End: 2018-11-28 | Stop reason: HOSPADM

## 2018-11-27 RX ORDER — ACETAMINOPHEN 325 MG/1
975 TABLET ORAL EVERY 8 HOURS SCHEDULED
Status: DISCONTINUED | OUTPATIENT
Start: 2018-11-27 | End: 2018-11-28 | Stop reason: HOSPADM

## 2018-11-27 RX ORDER — IBUPROFEN 600 MG/1
600 TABLET ORAL EVERY 6 HOURS PRN
Status: DISCONTINUED | OUTPATIENT
Start: 2018-11-27 | End: 2018-11-27

## 2018-11-27 RX ADMIN — IBUPROFEN 600 MG: 600 TABLET ORAL at 23:48

## 2018-11-27 RX ADMIN — IBUPROFEN 600 MG: 600 TABLET ORAL at 12:14

## 2018-11-27 RX ADMIN — DEXTROSE, SODIUM CHLORIDE, AND POTASSIUM CHLORIDE 100 ML/HR: 5; .45; .15 INJECTION INTRAVENOUS at 04:54

## 2018-11-27 RX ADMIN — HEPARIN SODIUM 5000 UNITS: 5000 INJECTION INTRAVENOUS; SUBCUTANEOUS at 23:48

## 2018-11-27 RX ADMIN — IBUPROFEN 600 MG: 600 TABLET ORAL at 08:20

## 2018-11-27 RX ADMIN — HEPARIN SODIUM 5000 UNITS: 5000 INJECTION INTRAVENOUS; SUBCUTANEOUS at 13:42

## 2018-11-27 RX ADMIN — ACETAMINOPHEN 975 MG: 325 TABLET, FILM COATED ORAL at 13:42

## 2018-11-27 RX ADMIN — ACETAMINOPHEN 975 MG: 325 TABLET, FILM COATED ORAL at 08:20

## 2018-11-27 RX ADMIN — IBUPROFEN 600 MG: 600 TABLET ORAL at 17:44

## 2018-11-27 RX ADMIN — ACETAMINOPHEN 975 MG: 325 TABLET, FILM COATED ORAL at 23:48

## 2018-11-27 NOTE — UTILIZATION REVIEW
Continued Stay Review    Date: 11/27/2018    Vital Signs: /63   Pulse 80   Temp 98 2 °F (36 8 °C)   Resp 20   Ht 5' 2" (1 575 m)   Wt 75 3 kg (166 lb)   SpO2 97%   BMI 30 36 kg/m²     Medications:   Scheduled Meds:   Current Facility-Administered Medications:  acetaminophen 975 mg Oral Q8H [facility]   heparin (porcine) 5,000 Units Subcutaneous Q8H [facility]   ibuprofen 600 mg Oral Q6H NADYA   ondansetron 4 mg Intravenous Q6H PRN   oxyCODONE 10 mg Oral Q4H PRN   oxyCODONE 5 mg Oral Q4H PRN     Nursing orders - VS q 4 - Up & OOB as tolerated - I & O q shift - SCD's to le's- up with assistance -  APTT q6 -  Diet reg house     Abnormal Labs/Diagnostic Results: 11/27 - Bun/cr 6/0 53 - Wbc 11 67 - H/H 9 8/30 4    Age/Sex: 77 y o  female     Assessment/Plan:   Postoperative day 4 tatus post diagnostic laparoscopy, exploratory laparotomy, modified radical hysterectomy, bilateral salpingo-oophorectomy, rectosigmoid resection with end-to-end reanastomosis, gastrocolic omentectomy, optimal tumor debulking for likely stage IIIC ovarian cancer after 3 cycles of neoadjuvant chemotherapy      1  DVT present on admission- Now status post IVC filter placement  Heparin drip currently on hold  Plan for possible transition to oral Xarelto today        2    Acute blood loss anemia secondary to surgery followed by therapeutic anticoagulation with hemoglobin 8+ grams/deciliter from a baseline of 10 grams/deciliter    hemoglobin stable  No indication for blood transfusion  No evidence of bleeding  Stable for initiation of therapeutic anticoagulation for history of deep vein thrombosis present on admission      3  Antibiotics with Ancef and Flagyl completed 72 hours postoperatively due to gross stool spillage intraoperatively      Ongoing postoperative care:  - await resumption of bowel function  Advance diet as tolerated    Patient is hungry and no evidence of nausea vomiting   - discontinue IV fluids, transitional medications to oral route   - encouraged out of bed and ambulation        Discharge Plan:  TBSIERRA

## 2018-11-27 NOTE — PROGRESS NOTES
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 ovarian cancer POD# 4 s/p dx lap, ex laparotomy, modified radical hysterectomy, BSO, rectosigmoid resection with end-to-end reanastomosis, omentectomy, and optimal tumor debulking  PLAN:  1  Ovarian cancer  - s/p surgery  - follow-up final pathology  - s/p 3 cycles neoadjuvant chemotherapy     2  Post-op care  - Dilaudid PCA --> no attempts for PCA dose since 1930 on 11/26/18  - Consider transition to IV vs oral pain medication  - s/p NGT, advance to regular diet today  - S/p Antibiotics (ancef/flagyl) x72h secondary to stool spillage  - Encourage ambulation     3  DVT  - IVC filter in place  - Therapeutic hepatin gtt, therapeutic levels achieved, for transition to Xarelto  - trending APTT/CBC q6hrs    - Hgb: 10 --> 8 9 --> 8 8 --> 8 5    - PTT: 45 --> 85 --> 104 --> 110 --> 109 --> 88 --> 36 --> 30    4  FEN  - advance to regular diet  - IVF    5  VTE ppx  - IVC filter, SCDs on/on  - Therapeutic heparin held due to decrease in hemoglobin s/p surgery  - Hemoglobin stable at 8 5 at this time; will consider starting Xarelto      ____________________    SUBJECTIVE  History of Present Illness:  Overnight: without acute events overnight    Pain: has not used PCA overnight  Tolerating Oral Intake: tolerating clear liquid diet; patient reports feeling hungry this morning  Voiding: yes  Flatus: burping, no flatus  Bowel Movement: no  Ambulating: yes  Chest Pain: no  Shortness of Breath: no  Leg Pain/Discomfort: no    OBJECTIVE  Vitals  Temp:  [98 06 °F (36 7 °C)-99 2 °F (37 3 °C)] 99 1 °F (37 3 °C)  HR:  [] 78  Resp:  [16-20] 18  BP: (133-147)/(63-78) 138/63       Intake/Output Summary (Last 24 hours) at 11/27/18 0643  Last data filed at 11/27/18 0256   Gross per 24 hour   Intake             1201 ml   Output             4700 ml   Net            -3499 ml       Physical Exam: Physical Exam   Constitutional: She is oriented to person, place, and time  She appears well-developed and well-nourished  No distress  Cardiovascular: Normal rate, regular rhythm, normal heart sounds and intact distal pulses  Pulmonary/Chest: Effort normal and breath sounds normal  No respiratory distress  She has no wheezes  Abdominal: Soft  Bowel sounds are normal  She exhibits no distension  There is no tenderness  Midline incision clean, dry, and intact  JESSI drain with approx 60mL serosanguinous output     Musculoskeletal: Normal range of motion  She exhibits no edema, tenderness or deformity  Neurological: She is alert and oriented to person, place, and time  Skin: Skin is warm and dry  She is not diaphoretic             Labs:   Recent Results (from the past 72 hour(s))   CBC and Platelet    Collection Time: 11/24/18  4:04 PM   Result Value Ref Range    WBC 14 38 (H) 4 31 - 10 16 Thousand/uL    RBC 3 61 (L) 3 81 - 5 12 Million/uL    Hemoglobin 10 2 (L) 11 5 - 15 4 g/dL    Hematocrit 32 2 (L) 34 8 - 46 1 %    MCV 89 82 - 98 fL    MCH 28 3 26 8 - 34 3 pg    MCHC 31 7 31 4 - 37 4 g/dL    RDW 20 9 (H) 11 6 - 15 1 %    Platelets 589 985 - 427 Thousands/uL    MPV 9 5 8 9 - 12 7 fL   APTT    Collection Time: 11/25/18 12:40 AM   Result Value Ref Range    PTT 45 (H) 26 - 38 seconds   CBC    Collection Time: 11/25/18 12:40 AM   Result Value Ref Range    WBC 14 31 (H) 4 31 - 10 16 Thousand/uL    RBC 3 43 (L) 3 81 - 5 12 Million/uL    Hemoglobin 9 6 (L) 11 5 - 15 4 g/dL    Hematocrit 30 7 (L) 34 8 - 46 1 %    MCV 90 82 - 98 fL    MCH 28 0 26 8 - 34 3 pg    MCHC 31 3 (L) 31 4 - 37 4 g/dL    RDW 20 6 (H) 11 6 - 15 1 %    Platelets 751 347 - 372 Thousands/uL    MPV 9 4 8 9 - 12 7 fL   APTT    Collection Time: 11/25/18  6:28 AM   Result Value Ref Range    PTT 85 (H) 26 - 38 seconds   CBC    Collection Time: 11/25/18  6:28 AM   Result Value Ref Range    WBC 14 14 (H) 4 31 - 10 16 Thousand/uL    RBC 3 49 (L) 3 81 - 5 12 Million/uL    Hemoglobin 10 0 (L) 11 5 - 15 4 g/dL    Hematocrit 31 1 (L) 34 8 - 46 1 %    MCV 89 82 - 98 fL    MCH 28 7 26 8 - 34 3 pg    MCHC 32 2 31 4 - 37 4 g/dL    RDW 20 8 (H) 11 6 - 15 1 %    Platelets 257 845 - 944 Thousands/uL    MPV 10 1 8 9 - 12 7 fL   APTT    Collection Time: 11/25/18 12:51 PM   Result Value Ref Range     (H) 26 - 38 seconds   CBC    Collection Time: 11/25/18 12:52 PM   Result Value Ref Range    WBC 16 96 (H) 4 31 - 10 16 Thousand/uL    RBC 3 56 (L) 3 81 - 5 12 Million/uL    Hemoglobin 10 0 (L) 11 5 - 15 4 g/dL    Hematocrit 32 0 (L) 34 8 - 46 1 %    MCV 90 82 - 98 fL    MCH 28 1 26 8 - 34 3 pg    MCHC 31 3 (L) 31 4 - 37 4 g/dL    RDW 20 5 (H) 11 6 - 15 1 %    Platelets 190 707 - 019 Thousands/uL    MPV 9 4 8 9 - 12 7 fL   APTT    Collection Time: 11/25/18  6:15 PM   Result Value Ref Range     (H) 26 - 38 seconds   CBC    Collection Time: 11/25/18  6:15 PM   Result Value Ref Range    WBC 16 41 (H) 4 31 - 10 16 Thousand/uL    RBC 3 57 (L) 3 81 - 5 12 Million/uL    Hemoglobin 10 0 (L) 11 5 - 15 4 g/dL    Hematocrit 32 0 (L) 34 8 - 46 1 %    MCV 90 82 - 98 fL    MCH 28 0 26 8 - 34 3 pg    MCHC 31 3 (L) 31 4 - 37 4 g/dL    RDW 20 2 (H) 11 6 - 15 1 %    Platelets 809 269 - 697 Thousands/uL    MPV 9 5 8 9 - 12 7 fL   APTT    Collection Time: 11/25/18  9:31 PM   Result Value Ref Range     (H) 26 - 38 seconds   APTT    Collection Time: 11/26/18  4:50 AM   Result Value Ref Range    PTT 88 (H) 26 - 38 seconds   CBC    Collection Time: 11/26/18  4:50 AM   Result Value Ref Range    WBC 14 03 (H) 4 31 - 10 16 Thousand/uL    RBC 3 22 (L) 3 81 - 5 12 Million/uL    Hemoglobin 8 9 (L) 11 5 - 15 4 g/dL    Hematocrit 28 9 (L) 34 8 - 46 1 %    MCV 90 82 - 98 fL    MCH 27 6 26 8 - 34 3 pg    MCHC 30 8 (L) 31 4 - 37 4 g/dL    RDW 20 0 (H) 11 6 - 15 1 %    Platelets 378 547 - 149 Thousands/uL    MPV 9 5 8 9 - 12 7 fL   Prepare RBC:Special Requirements: None; Has consent been obtained? Yes; Where is the Surgery Scheduled?  [employer], 2 Units    Collection Time: 11/26/18  7:15 AM   Result Value Ref Range    Unit Product Code D7549O82     Unit Number [id number]     Unit ABO A     Unit RH POS     Crossmatch Compatible     Unit Dispense Status Return to [facility]     Unit Product Code E1743V87     Unit Number [id number]     Unit ABO A     Unit [facility] POS     Crossmatch Compatible     Unit Dispense Status Return to Inv    CBC    Collection Time: 11/26/18 11:06 AM   Result Value Ref Range    WBC 13 16 (H) 4 31 - 10 16 Thousand/uL    RBC 3 15 (L) 3 81 - 5 12 Million/uL    Hemoglobin 8 8 (L) 11 5 - 15 4 g/dL    Hematocrit 28 2 (L) 34 8 - 46 1 %    MCV 90 82 - 98 fL    MCH 27 9 26 8 - 34 3 pg    MCHC 31 2 (L) 31 4 - 37 4 g/dL    RDW 19 9 (H) 11 6 - 15 1 %    Platelets 976 237 - 582 Thousands/uL    MPV 9 8 8 9 - 12 7 fL   APTT    Collection Time: 11/26/18 11:06 AM   Result Value Ref Range    PTT 36 26 - 38 seconds   CBC    Collection Time: 11/26/18 11:59 PM   Result Value Ref Range    WBC 12 04 (H) 4 31 - 10 16 Thousand/uL    RBC 3 26 (L) 3 81 - 5 12 Million/uL    Hemoglobin 9 1 (L) 11 5 - 15 4 g/dL    Hematocrit 28 8 (L) 34 8 - 46 1 %    MCV 88 82 - 98 fL    MCH 27 9 26 8 - 34 3 pg    MCHC 31 6 31 4 - 37 4 g/dL    RDW 19 6 (H) 11 6 - 15 1 %    Platelets 165 814 - 901 Thousands/uL    MPV 10 0 8 9 - 12 7 fL   CBC    Collection Time: 11/27/18  4:42 AM   Result Value Ref Range    WBC 9 99 4 31 - 10 16 Thousand/uL    RBC 3 06 (L) 3 81 - 5 12 Million/uL    Hemoglobin 8 6 (L) 11 5 - 15 4 g/dL    Hematocrit 27 1 (L) 34 8 - 46 1 %    MCV 89 82 - 98 fL    MCH 28 1 26 8 - 34 3 pg    MCHC 31 7 31 4 - 37 4 g/dL    RDW 19 8 (H) 11 6 - 15 1 %    Platelets 258 776 - 934 Thousands/uL    MPV 9 6 8 9 - 12 7 fL   CBC and differential    Collection Time: 11/27/18  4:51 AM   Result Value Ref Range    WBC 9 83 4 31 - 10 16 Thousand/uL    RBC 3 07 (L) 3 81 - 5 12 Million/uL    Hemoglobin 8 5 (L) 11 5 - 15 4 g/dL Hematocrit 27 2 (L) 34 8 - 46 1 %    MCV 89 82 - 98 fL    MCH 27 7 26 8 - 34 3 pg    MCHC 31 3 (L) 31 4 - 37 4 g/dL    RDW 19 7 (H) 11 6 - 15 1 %    MPV 9 4 8 9 - 12 7 fL    Platelets 618 863 - 067 Thousands/uL    nRBC 0 /100 WBCs    Neutrophils Relative 67 43 - 75 %    Immat GRANS % 1 0 - 2 %    Lymphocytes Relative 15 14 - 44 %    Monocytes Relative 16 (H) 4 - 12 %    Eosinophils Relative 1 0 - 6 %    Basophils Relative 0 0 - 1 %    Neutrophils Absolute 6 56 1 85 - 7 62 Thousands/µL    Immature Grans Absolute 0 08 0 00 - 0 20 Thousand/uL    Lymphocytes Absolute 1 51 0 60 - 4 47 Thousands/µL    Monocytes Absolute 1 53 (H) 0 17 - 1 22 Thousand/µL    Eosinophils Absolute 0 12 0 00 - 0 61 Thousand/µL    Basophils Absolute 0 03 0 00 - 0 10 Thousands/µL   APTT    Collection Time: 11/27/18  4:51 AM   Result Value Ref Range    PTT 30 26 - 38 seconds       Meds:  Scheduled Meds:    Current Facility-Administered Medications:  acetaminophen 650 mg Oral Q6H PRN Mariella Burt MD    dextrose 5 % and sodium chloride 0 45 % with KCl 20 mEq/L 100 mL/hr Intravenous Continuous Piedad Davis MD Last Rate: 100 mL/hr (11/27/18 0454)   heparin (porcine) 3-30 Units/kg/hr (Order-Specific) Intravenous Titrated Elizabeth Houston MD Last Rate: Stopped (11/26/18 0900)   HYDROmorphone  Intravenous Continuous Mariella Burt MD    lactated ringers 75 mL/hr Intravenous Continuous Kush Hampton MD Last Rate: Stopped (11/23/18 1722)   ondansetron 4 mg Intravenous Q6H PRN Mariella Burt MD      Continuous Infusions:    dextrose 5 % and sodium chloride 0 45 % with KCl 20 mEq/L 100 mL/hr Last Rate: 100 mL/hr (11/27/18 0454)   heparin (porcine) 3-30 Units/kg/hr (Order-Specific) Last Rate: Stopped (11/26/18 0900)   HYDROmorphone     lactated ringers 75 mL/hr Last Rate: Stopped (11/23/18 1722)     PRN Meds:   acetaminophen    ondansetron    __________________    Sheryl Taylor MD, PGY-1  11/27/2018  6:54 AM

## 2018-11-28 VITALS
HEIGHT: 62 IN | OXYGEN SATURATION: 96 % | SYSTOLIC BLOOD PRESSURE: 142 MMHG | BODY MASS INDEX: 30.55 KG/M2 | DIASTOLIC BLOOD PRESSURE: 63 MMHG | WEIGHT: 166 LBS | TEMPERATURE: 97.88 F | RESPIRATION RATE: 20 BRPM | HEART RATE: 79 BPM

## 2018-11-28 LAB
ANION GAP SERPL CALCULATED.3IONS-SCNC: 5 MMOL/L (ref 4–13)
APTT PPP: 32 SECONDS (ref 26–38)
BASOPHILS # BLD AUTO: 0.03 THOUSANDS/ΜL (ref 0–0.1)
BASOPHILS NFR BLD AUTO: 0 % (ref 0–1)
BUN SERPL-MCNC: 17 MG/DL (ref 5–25)
CALCIUM SERPL-MCNC: 8.5 MG/DL (ref 8.3–10.1)
CHLORIDE SERPL-SCNC: 106 MMOL/L (ref 100–108)
CO2 SERPL-SCNC: 29 MMOL/L (ref 21–32)
CREAT SERPL-MCNC: 0.62 MG/DL (ref 0.6–1.3)
EOSINOPHIL # BLD AUTO: 0.34 THOUSAND/ΜL (ref 0–0.61)
EOSINOPHIL NFR BLD AUTO: 4 % (ref 0–6)
ERYTHROCYTE [DISTWIDTH] IN BLOOD BY AUTOMATED COUNT: 19.7 % (ref 11.6–15.1)
ERYTHROCYTE [DISTWIDTH] IN BLOOD BY AUTOMATED COUNT: 19.7 % (ref 11.6–15.1)
ERYTHROCYTE [DISTWIDTH] IN BLOOD BY AUTOMATED COUNT: 19.8 % (ref 11.6–15.1)
ERYTHROCYTE [DISTWIDTH] IN BLOOD BY AUTOMATED COUNT: 19.9 % (ref 11.6–15.1)
GFR SERPL CREATININE-BSD FRML MDRD: 86 ML/MIN/1.73SQ M
GLUCOSE SERPL-MCNC: 96 MG/DL (ref 65–140)
HCT VFR BLD AUTO: 27.4 % (ref 34.8–46.1)
HCT VFR BLD AUTO: 27.6 % (ref 34.8–46.1)
HCT VFR BLD AUTO: 27.8 % (ref 34.8–46.1)
HCT VFR BLD AUTO: 28.7 % (ref 34.8–46.1)
HGB BLD-MCNC: 8.7 G/DL (ref 11.5–15.4)
HGB BLD-MCNC: 8.8 G/DL (ref 11.5–15.4)
IMM GRANULOCYTES # BLD AUTO: 0.08 THOUSAND/UL (ref 0–0.2)
IMM GRANULOCYTES NFR BLD AUTO: 1 % (ref 0–2)
LYMPHOCYTES # BLD AUTO: 1.82 THOUSANDS/ΜL (ref 0.6–4.47)
LYMPHOCYTES NFR BLD AUTO: 22 % (ref 14–44)
MCH RBC QN AUTO: 27.8 PG (ref 26.8–34.3)
MCH RBC QN AUTO: 27.9 PG (ref 26.8–34.3)
MCH RBC QN AUTO: 28 PG (ref 26.8–34.3)
MCH RBC QN AUTO: 28.1 PG (ref 26.8–34.3)
MCHC RBC AUTO-ENTMCNC: 30.7 G/DL (ref 31.4–37.4)
MCHC RBC AUTO-ENTMCNC: 31.3 G/DL (ref 31.4–37.4)
MCHC RBC AUTO-ENTMCNC: 31.5 G/DL (ref 31.4–37.4)
MCHC RBC AUTO-ENTMCNC: 31.8 G/DL (ref 31.4–37.4)
MCV RBC AUTO: 88 FL (ref 82–98)
MCV RBC AUTO: 89 FL (ref 82–98)
MCV RBC AUTO: 89 FL (ref 82–98)
MCV RBC AUTO: 91 FL (ref 82–98)
MONOCYTES # BLD AUTO: 1.12 THOUSAND/ΜL (ref 0.17–1.22)
MONOCYTES NFR BLD AUTO: 14 % (ref 4–12)
NEUTROPHILS # BLD AUTO: 4.75 THOUSANDS/ΜL (ref 1.85–7.62)
NEUTS SEG NFR BLD AUTO: 59 % (ref 43–75)
NRBC BLD AUTO-RTO: 0 /100 WBCS
PLATELET # BLD AUTO: 170 THOUSANDS/UL (ref 149–390)
PLATELET # BLD AUTO: 211 THOUSANDS/UL (ref 149–390)
PLATELET # BLD AUTO: 212 THOUSANDS/UL (ref 149–390)
PLATELET # BLD AUTO: 222 THOUSANDS/UL (ref 149–390)
PMV BLD AUTO: 10 FL (ref 8.9–12.7)
PMV BLD AUTO: 10.4 FL (ref 8.9–12.7)
PMV BLD AUTO: 9.5 FL (ref 8.9–12.7)
PMV BLD AUTO: 9.6 FL (ref 8.9–12.7)
POTASSIUM SERPL-SCNC: 3.6 MMOL/L (ref 3.5–5.3)
RBC # BLD AUTO: 3.1 MILLION/UL (ref 3.81–5.12)
RBC # BLD AUTO: 3.11 MILLION/UL (ref 3.81–5.12)
RBC # BLD AUTO: 3.13 MILLION/UL (ref 3.81–5.12)
RBC # BLD AUTO: 3.15 MILLION/UL (ref 3.81–5.12)
SODIUM SERPL-SCNC: 140 MMOL/L (ref 136–145)
WBC # BLD AUTO: 10.05 THOUSAND/UL (ref 4.31–10.16)
WBC # BLD AUTO: 8.14 THOUSAND/UL (ref 4.31–10.16)
WBC # BLD AUTO: 8.63 THOUSAND/UL (ref 4.31–10.16)
WBC # BLD AUTO: 8.86 THOUSAND/UL (ref 4.31–10.16)

## 2018-11-28 PROCEDURE — 85025 COMPLETE CBC W/AUTO DIFF WBC: CPT | Performed by: OBSTETRICS & GYNECOLOGY

## 2018-11-28 PROCEDURE — 85027 COMPLETE CBC AUTOMATED: CPT | Performed by: OBSTETRICS & GYNECOLOGY

## 2018-11-28 PROCEDURE — 99024 POSTOP FOLLOW-UP VISIT: CPT | Performed by: OBSTETRICS & GYNECOLOGY

## 2018-11-28 PROCEDURE — 80048 BASIC METABOLIC PNL TOTAL CA: CPT | Performed by: OBSTETRICS & GYNECOLOGY

## 2018-11-28 PROCEDURE — 85730 THROMBOPLASTIN TIME PARTIAL: CPT | Performed by: OBSTETRICS & GYNECOLOGY

## 2018-11-28 RX ORDER — DOCUSATE SODIUM 100 MG/1
100 CAPSULE, LIQUID FILLED ORAL 2 TIMES DAILY
Qty: 10 CAPSULE | Refills: 0 | Status: SHIPPED | OUTPATIENT
Start: 2018-11-28 | End: 2019-03-11 | Stop reason: ALTCHOICE

## 2018-11-28 RX ORDER — POLYETHYLENE GLYCOL 3350 17 G/17G
17 POWDER, FOR SOLUTION ORAL DAILY
Qty: 14 EACH | Refills: 0 | Status: SHIPPED | OUTPATIENT
Start: 2018-11-28 | End: 2019-03-11 | Stop reason: ALTCHOICE

## 2018-11-28 RX ADMIN — ACETAMINOPHEN 975 MG: 325 TABLET, FILM COATED ORAL at 06:24

## 2018-11-28 RX ADMIN — IBUPROFEN 600 MG: 600 TABLET ORAL at 06:25

## 2018-11-28 RX ADMIN — HEPARIN SODIUM 5000 UNITS: 5000 INJECTION INTRAVENOUS; SUBCUTANEOUS at 06:25

## 2018-11-28 RX ADMIN — RIVAROXABAN 20 MG: 20 TABLET, FILM COATED ORAL at 09:10

## 2018-11-28 NOTE — PROGRESS NOTES
GYN-ONC Progress Note  Severiano Russo  [id number]  [facility]  11/28/2018  6:11 AM    ASSESS: 77 y o  female with likely stage IIIC ovarian cancer POD# 4 s/p dx lap, ex laparotomy, modified radical hysterectomy, BSO, rectosigmoid resection with end-to-end reanastomosis, omentectomy, and optimal tumor debulking  PLAN:  1  Ovarian cancer  - s/p surgery  - follow-up final pathology  - s/p 3 cycles neoadjuvant chemotherapy     2  Post-op care  - Transitioned to oral pain medications on 11/27  - s/p NGT, tolerating regular diet  - S/p Antibiotics (ancef/flagyl) x72h secondary to stool spillage  - Encourage ambulation     3  DVT  - IVC filter in place  - Therapeutic hepatin gtt, therapeutic levels achieved; will restart Xarelto  - trending APTT/CBC q6hrs    - Hgb: 10 --> 8 9 --> 8 8 --> 8 5 --> 8 7   - PTT: 45 --> 85 --> 104 --> 110 --> 109 --> 88 --> 36 --> 30    4  FEN  - Tolerating regular diet  - JESSI drain in place, draining approximately 50mL of serosanguinous fluid this morning; will pull JESSI drain today    5  VTE ppx  - IVC filter, SCDs on/on  - Therapeutic heparin held due to decrease in hemoglobin s/p surgery  - Hemoglobin stable at 8 7 at this time; will restart Xarelto 20mg daily    6  Dispo: Transition to Xarelto 20mg daily and d/c home       ____________________    SUBJECTIVE  History of Present Illness:  Overnight: without acute events overnight    Pain: well controlled with scheduled tylenol and motrin  Tolerating Oral Intake: tolerating regular diet  Voiding: yes  Flatus: yes  Bowel Movement: yes  Ambulating: yes  Chest Pain: no  Shortness of Breath: no  Leg Pain/Discomfort: no    OBJECTIVE  Vitals  Temp:  [97 7 °F (36 5 °C)-98 2 °F (36 8 °C)] 97 7 °F (36 5 °C)  HR:  [80-88] 85  Resp:  [18-20] 18  BP: (134-141)/(62-63) 141/62       Intake/Output Summary (Last 24 hours) at 11/28/18 0611  Last data filed at 11/28/18 0239   Gross per 24 hour   Intake              840 ml   Output             1200 ml Net             -360 ml       Physical Exam:   Physical Exam   Constitutional: She is oriented to person, place, and time  She appears well-developed and well-nourished  No distress  Cardiovascular: Normal rate, regular rhythm, normal heart sounds and intact distal pulses  Pulmonary/Chest: Effort normal and breath sounds normal  No respiratory distress  She has no wheezes  Abdominal: Soft  Bowel sounds are normal  She exhibits no distension  There is no tenderness  Midline incision clean, dry, and intact  JESSI drain with approx 50mL serosanguinous output     Musculoskeletal: Normal range of motion  She exhibits no edema, tenderness or deformity  Neurological: She is alert and oriented to person, place, and time  Skin: Skin is warm and dry  She is not diaphoretic         Labs:   Recent Results (from the past 72 hour(s))   APTT    Collection Time: 11/25/18  6:28 AM   Result Value Ref Range    PTT 85 (H) 26 - 38 seconds   CBC    Collection Time: 11/25/18  6:28 AM   Result Value Ref Range    WBC 14 14 (H) 4 31 - 10 16 Thousand/uL    RBC 3 49 (L) 3 81 - 5 12 Million/uL    Hemoglobin 10 0 (L) 11 5 - 15 4 g/dL    Hematocrit 31 1 (L) 34 8 - 46 1 %    MCV 89 82 - 98 fL    MCH 28 7 26 8 - 34 3 pg    MCHC 32 2 31 4 - 37 4 g/dL    RDW 20 8 (H) 11 6 - 15 1 %    Platelets 039 373 - 929 Thousands/uL    MPV 10 1 8 9 - 12 7 fL   APTT    Collection Time: 11/25/18 12:51 PM   Result Value Ref Range     (H) 26 - 38 seconds   CBC    Collection Time: 11/25/18 12:52 PM   Result Value Ref Range    WBC 16 96 (H) 4 31 - 10 16 Thousand/uL    RBC 3 56 (L) 3 81 - 5 12 Million/uL    Hemoglobin 10 0 (L) 11 5 - 15 4 g/dL    Hematocrit 32 0 (L) 34 8 - 46 1 %    MCV 90 82 - 98 fL    MCH 28 1 26 8 - 34 3 pg    MCHC 31 3 (L) 31 4 - 37 4 g/dL    RDW 20 5 (H) 11 6 - 15 1 %    Platelets 618 844 - 388 Thousands/uL    MPV 9 4 8 9 - 12 7 fL   APTT    Collection Time: 11/25/18  6:15 PM   Result Value Ref Range     (H) 26 - 38 seconds   CBC    Collection Time: 11/25/18  6:15 PM   Result Value Ref Range    WBC 16 41 (H) 4 31 - 10 16 Thousand/uL    RBC 3 57 (L) 3 81 - 5 12 Million/uL    Hemoglobin 10 0 (L) 11 5 - 15 4 g/dL    Hematocrit 32 0 (L) 34 8 - 46 1 %    MCV 90 82 - 98 fL    MCH 28 0 26 8 - 34 3 pg    MCHC 31 3 (L) 31 4 - 37 4 g/dL    RDW 20 2 (H) 11 6 - 15 1 %    Platelets 487 886 - 326 Thousands/uL    MPV 9 5 8 9 - 12 7 fL   APTT    Collection Time: 11/25/18  9:31 PM   Result Value Ref Range     (H) 26 - 38 seconds   APTT    Collection Time: 11/26/18  4:50 AM   Result Value Ref Range    PTT 88 (H) 26 - 38 seconds   CBC    Collection Time: 11/26/18  4:50 AM   Result Value Ref Range    WBC 14 03 (H) 4 31 - 10 16 Thousand/uL    RBC 3 22 (L) 3 81 - 5 12 Million/uL    Hemoglobin 8 9 (L) 11 5 - 15 4 g/dL    Hematocrit 28 9 (L) 34 8 - 46 1 %    MCV 90 82 - 98 fL    MCH 27 6 26 8 - 34 3 pg    MCHC 30 8 (L) 31 4 - 37 4 g/dL    RDW 20 0 (H) 11 6 - 15 1 %    Platelets 075 371 - 237 Thousands/uL    MPV 9 5 8 9 - 12 7 fL   Prepare RBC:Special Requirements: None; Has consent been obtained? Yes; Where is the Surgery Scheduled?  [place] [id number] Units    Collection Time: 11/26/18  7:15 AM   Result Value Ref Range    Unit Product Code G0314X05     Unit Number [id number]     Unit ABO A     Unit RH POS     Crossmatch Compatible     Unit Dispense Status Return to [facility]     Unit Product Code E4485L96     Unit Number [id number]     Unit ABO A     Unit [facility] POS     Crossmatch Compatible     Unit Dispense Status Return to [facility]    CBC    Collection Time: 11/26/18 11:06 AM   Result Value Ref Range    WBC 13 16 (H) 4 31 - 10 16 Thousand/uL    RBC 3 15 (L) 3 81 - 5 12 Million/uL    Hemoglobin 8 8 (L) 11 5 - 15 4 g/dL    Hematocrit 28 2 (L) 34 8 - 46 1 %    MCV 90 82 - 98 fL    MCH 27 9 26 8 - 34 3 pg    MCHC 31 2 (L) 31 4 - 37 4 g/dL    RDW 19 9 (H) 11 6 - 15 1 %    Platelets 985 350 - 036 Thousands/uL    MPV 9 8 8 9 - 12 7 fL   APTT Collection Time: 11/26/18 11:06 AM   Result Value Ref Range    PTT 36 26 - 38 seconds   CBC    Collection Time: 11/26/18 11:59 PM   Result Value Ref Range    WBC 12 04 (H) 4 31 - 10 16 Thousand/uL    RBC 3 26 (L) 3 81 - 5 12 Million/uL    Hemoglobin 9 1 (L) 11 5 - 15 4 g/dL    Hematocrit 28 8 (L) 34 8 - 46 1 %    MCV 88 82 - 98 fL    MCH 27 9 26 8 - 34 3 pg    MCHC 31 6 31 4 - 37 4 g/dL    RDW 19 6 (H) 11 6 - 15 1 %    Platelets 804 640 - 153 Thousands/uL    MPV 10 0 8 9 - 12 7 fL   CBC    Collection Time: 11/27/18  4:42 AM   Result Value Ref Range    WBC 9 99 4 31 - 10 16 Thousand/uL    RBC 3 06 (L) 3 81 - 5 12 Million/uL    Hemoglobin 8 6 (L) 11 5 - 15 4 g/dL    Hematocrit 27 1 (L) 34 8 - 46 1 %    MCV 89 82 - 98 fL    MCH 28 1 26 8 - 34 3 pg    MCHC 31 7 31 4 - 37 4 g/dL    RDW 19 8 (H) 11 6 - 15 1 %    Platelets 823 988 - 813 Thousands/uL    MPV 9 6 8 9 - 12 7 fL   CBC and differential    Collection Time: 11/27/18  4:51 AM   Result Value Ref Range    WBC 9 83 4 31 - 10 16 Thousand/uL    RBC 3 07 (L) 3 81 - 5 12 Million/uL    Hemoglobin 8 5 (L) 11 5 - 15 4 g/dL    Hematocrit 27 2 (L) 34 8 - 46 1 %    MCV 89 82 - 98 fL    MCH 27 7 26 8 - 34 3 pg    MCHC 31 3 (L) 31 4 - 37 4 g/dL    RDW 19 7 (H) 11 6 - 15 1 %    MPV 9 4 8 9 - 12 7 fL    Platelets 798 789 - 147 Thousands/uL    nRBC 0 /100 WBCs    Neutrophils Relative 67 43 - 75 %    Immat GRANS % 1 0 - 2 %    Lymphocytes Relative 15 14 - 44 %    Monocytes Relative 16 (H) 4 - 12 %    Eosinophils Relative 1 0 - 6 %    Basophils Relative 0 0 - 1 %    Neutrophils Absolute 6 56 1 85 - 7 62 Thousands/µL    Immature Grans Absolute 0 08 0 00 - 0 20 Thousand/uL    Lymphocytes Absolute 1 51 0 60 - 4 47 Thousands/µL    Monocytes Absolute 1 53 (H) 0 17 - 1 22 Thousand/µL    Eosinophils Absolute 0 12 0 00 - 0 61 Thousand/µL    Basophils Absolute 0 03 0 00 - 0 10 Thousands/µL   APTT    Collection Time: 11/27/18  4:51 AM   Result Value Ref Range    PTT 30 26 - 38 seconds   Basic metabolic panel    Collection Time: 11/27/18  8:22 AM   Result Value Ref Range    Sodium 137 136 - 145 mmol/L    Potassium 3 6 3 5 - 5 3 mmol/L    Chloride 103 100 - 108 mmol/L    CO2 30 21 - 32 mmol/L    ANION GAP 4 4 - 13 mmol/L    BUN 6 5 - 25 mg/dL    Creatinine 0 53 (L) 0 60 - 1 30 mg/dL    Glucose 115 65 - 140 mg/dL    Calcium 9 0 8 3 - 10 1 mg/dL    eGFR 91 ml/min/1 73sq m   CBC    Collection Time: 11/27/18 11:58 AM   Result Value Ref Range    WBC 11 67 (H) 4 31 - 10 16 Thousand/uL    RBC 3 41 (L) 3 81 - 5 12 Million/uL    Hemoglobin 9 8 (L) 11 5 - 15 4 g/dL    Hematocrit 30 4 (L) 34 8 - 46 1 %    MCV 89 82 - 98 fL    MCH 28 7 26 8 - 34 3 pg    MCHC 32 2 31 4 - 37 4 g/dL    RDW 19 7 (H) 11 6 - 15 1 %    Platelets 536 904 - 225 Thousands/uL    MPV 9 4 8 9 - 12 7 fL   CBC    Collection Time: 11/27/18  6:16 PM   Result Value Ref Range    WBC 10 05 4 31 - 10 16 Thousand/uL    RBC 3 15 (L) 3 81 - 5 12 Million/uL    Hemoglobin 8 8 (L) 11 5 - 15 4 g/dL    Hematocrit 28 7 (L) 34 8 - 46 1 %    MCV 91 82 - 98 fL    MCH 27 9 26 8 - 34 3 pg    MCHC 30 7 (L) 31 4 - 37 4 g/dL    RDW 19 9 (H) 11 6 - 15 1 %    Platelets 652 791 - 832 Thousands/uL    MPV 10 0 8 9 - 12 7 fL   CBC    Collection Time: 11/27/18  9:49 PM   Result Value Ref Range    WBC 9 39 4 31 - 10 16 Thousand/uL    RBC 3 15 (L) 3 81 - 5 12 Million/uL    Hemoglobin 8 8 (L) 11 5 - 15 4 g/dL    Hematocrit 28 0 (L) 34 8 - 46 1 %    MCV 89 82 - 98 fL    MCH 27 9 26 8 - 34 3 pg    MCHC 31 4 31 4 - 37 4 g/dL    RDW 19 8 (H) 11 6 - 15 1 %    Platelets 890 633 - 680 Thousands/uL    MPV 9 5 8 9 - 12 7 fL   CBC    Collection Time: 11/27/18 11:49 PM   Result Value Ref Range    WBC 8 86 4 31 - 10 16 Thousand/uL    RBC 3 10 (L) 3 81 - 5 12 Million/uL    Hemoglobin 8 7 (L) 11 5 - 15 4 g/dL    Hematocrit 27 4 (L) 34 8 - 46 1 %    MCV 88 82 - 98 fL    MCH 28 1 26 8 - 34 3 pg    MCHC 31 8 31 4 - 37 4 g/dL    RDW 19 7 (H) 11 6 - 15 1 %    Platelets 356 000 - 984 Thousands/uL MPV 9 5 8 9 - 12 7 fL       Meds:  Scheduled Meds:    Current Facility-Administered Medications:  acetaminophen 975 mg Oral [facility] Adamaris Parish MD   heparin (porcine) 5,000 Units Subcutaneous [facility] David Brown DO   ibuprofen 600 mg Oral Q6H [facility] Adamaris Parish MD   ondansetron 4 mg Intravenous Q6H PRN Anayeli Lucio MD   oxyCODONE 10 mg Oral Q4H PRN Adamaris Parish MD   oxyCODONE 5 mg Oral Q4H PRN Adamaris Parish MD     Continuous Infusions:     PRN Meds: ondansetron    oxyCODONE    oxyCODONE    __________________    Adamaris Parish MD, PGY-1  11/28/2018  6:11 AM

## 2018-11-28 NOTE — PLAN OF CARE
DISCHARGE PLANNING     Discharge to home or other facility with appropriate resources Completed        DISCHARGE PLANNING - CARE MANAGEMENT     Discharge to post-acute care or home with appropriate resources Completed        INFECTION - ADULT     Absence or prevention of progression during hospitalization Completed        Knowledge Deficit     Patient/family/caregiver demonstrates understanding of disease process, treatment plan, medications, and discharge instructions Completed        Nutrition/Hydration-ADULT     Nutrient/Hydration intake appropriate for improving, restoring or maintaining nutritional needs Completed        PAIN - ADULT     Verbalizes/displays adequate comfort level or baseline comfort level Completed        Potential for Falls     Patient will remain free of falls Completed        Prexisting or High Potential for Compromised Skin Integrity     Skin integrity is maintained or improved Completed        SAFETY ADULT     Patient will remain free of falls Completed

## 2018-11-28 NOTE — PROGRESS NOTES
Paged and talked with Enoc Forbes 27 re: Need for PT/INR? Per Gyn-Onc: no further labs are needed at this time; Pt is to be discharged to home later today and JESSI drain was removed earlier this morning

## 2018-11-29 DIAGNOSIS — C57.9 GYNECOLOGIC CANCER (HCC): Primary | ICD-10-CM

## 2018-11-29 DIAGNOSIS — I82.4Z2 DVT, LOWER EXTREMITY, DISTAL, ACUTE, LEFT (HCC): ICD-10-CM

## 2018-11-30 ENCOUNTER — APPOINTMENT (OUTPATIENT)
Dept: LAB | Facility: HOSPITAL | Age: 78
End: 2018-11-30
Payer: MEDICARE

## 2018-11-30 ENCOUNTER — TRANSCRIBE ORDERS (OUTPATIENT)
Dept: ADMINISTRATIVE | Facility: HOSPITAL | Age: 78
End: 2018-11-30

## 2018-11-30 DIAGNOSIS — C57.9 GYNECOLOGIC CANCER (HCC): ICD-10-CM

## 2018-11-30 DIAGNOSIS — I82.4Z2 DVT, LOWER EXTREMITY, DISTAL, ACUTE, LEFT (HCC): ICD-10-CM

## 2018-11-30 LAB
BASOPHILS # BLD AUTO: 0.05 THOUSANDS/ΜL (ref 0–0.1)
BASOPHILS NFR BLD AUTO: 1 % (ref 0–1)
EOSINOPHIL # BLD AUTO: 0.19 THOUSAND/ΜL (ref 0–0.61)
EOSINOPHIL NFR BLD AUTO: 2 % (ref 0–6)
ERYTHROCYTE [DISTWIDTH] IN BLOOD BY AUTOMATED COUNT: 19.7 % (ref 11.6–15.1)
HCT VFR BLD AUTO: 31.1 % (ref 34.8–46.1)
HGB BLD-MCNC: 9.4 G/DL (ref 11.5–15.4)
IMM GRANULOCYTES # BLD AUTO: 0.06 THOUSAND/UL (ref 0–0.2)
IMM GRANULOCYTES NFR BLD AUTO: 1 % (ref 0–2)
LYMPHOCYTES # BLD AUTO: 2.24 THOUSANDS/ΜL (ref 0.6–4.47)
LYMPHOCYTES NFR BLD AUTO: 22 % (ref 14–44)
MCH RBC QN AUTO: 27.6 PG (ref 26.8–34.3)
MCHC RBC AUTO-ENTMCNC: 30.2 G/DL (ref 31.4–37.4)
MCV RBC AUTO: 92 FL (ref 82–98)
MONOCYTES # BLD AUTO: 1.17 THOUSAND/ΜL (ref 0.17–1.22)
MONOCYTES NFR BLD AUTO: 12 % (ref 4–12)
NEUTROPHILS # BLD AUTO: 6.46 THOUSANDS/ΜL (ref 1.85–7.62)
NEUTS SEG NFR BLD AUTO: 62 % (ref 43–75)
NRBC BLD AUTO-RTO: 0 /100 WBCS
PLATELET # BLD AUTO: 345 THOUSANDS/UL (ref 149–390)
PMV BLD AUTO: 9.3 FL (ref 8.9–12.7)
RBC # BLD AUTO: 3.4 MILLION/UL (ref 3.81–5.12)
WBC # BLD AUTO: 10.17 THOUSAND/UL (ref 4.31–10.16)

## 2018-11-30 PROCEDURE — 36415 COLL VENOUS BLD VENIPUNCTURE: CPT

## 2018-11-30 PROCEDURE — 85025 COMPLETE CBC W/AUTO DIFF WBC: CPT

## 2018-12-06 ENCOUNTER — OFFICE VISIT (OUTPATIENT)
Dept: GYNECOLOGIC ONCOLOGY | Facility: CLINIC | Age: 78
End: 2018-12-06

## 2018-12-06 VITALS
RESPIRATION RATE: 16 BRPM | HEIGHT: 62 IN | BODY MASS INDEX: 29.63 KG/M2 | HEART RATE: 110 BPM | SYSTOLIC BLOOD PRESSURE: 150 MMHG | TEMPERATURE: 98.1 F | WEIGHT: 161 LBS | DIASTOLIC BLOOD PRESSURE: 80 MMHG

## 2018-12-06 DIAGNOSIS — C56.3 MALIGNANT NEOPLASM OF BOTH OVARIES (HCC): Primary | ICD-10-CM

## 2018-12-06 DIAGNOSIS — I82.4Z2 DVT, LOWER EXTREMITY, DISTAL, ACUTE, LEFT (HCC): ICD-10-CM

## 2018-12-06 PROCEDURE — 99024 POSTOP FOLLOW-UP VISIT: CPT | Performed by: OBSTETRICS & GYNECOLOGY

## 2018-12-06 NOTE — LETTER
December 6, 2018     Jessi Delgado MD  [address] [address]    Patient: Karen Joshua   YOB: 1940   Date of Visit: 12/6/2018       Dear Dr Neri Favorite:    Thank you for referring Karen Joshua to me for evaluation  Below are my notes for this consultation  If you have questions, please do not hesitate to call me  I look forward to following your patient along with you  Sincerely,        Armando Jim MD        CC: No Recipients  Armando Jim MD  12/6/2018 11:03 AM  Sign at close encounter  Assessment/Plan:    Problem List Items Addressed This Visit        Cardiovascular and Mediastinum    DVT, lower extremity, distal, acute, left ([facility] [address] )     Will assess lower extremity Dopplers prior to removal of IVC filter  Continue Xarelto         Relevant Orders    VAS lower limb venous duplex study, complete bilateral       Genitourinary    Malignant neoplasm of both ovaries ([facility] [address] ) - Primary     77year-old recovering well from cytoreductive surgery  Intraoperative impression was infiltration of omentum and mesorectum  Residual tumor identified in the ovaries  Her performance status is 0   1  I discussed the risks and benefits of restarting chemotherapy with carboplatin at AUC 5 and Taxol 175 milligrams/meter squared every 3 weeks to reduce hematologic toxicity from the weekly regimen  She understands the rationale for changing the regimen and agrees to proceed as outlined  2  I discussed ongoing activity limitations  3  Return as per her chemotherapy calender for follow-up  CHIEF COMPLAINT:  Postoperative evaluation       Problem:  Stage IIIC ovarian cancer    Previous therapy:     Malignant neoplasm of both ovaries ([facility] [address] )    8/16/2018 Biopsy     Paracentesis, metastatic adenocarcinoma, consistent with gyn primary          Chemotherapy     Neoadjuvant chemotherapy with taxol 80 mg/m2 weekly and carboplatin AUC 6 every 21 days   She received 2 cycles and is scheduled for cycle 3           2018 Surgery     Diagnostic laparoscopy, exploratory laparotomy, modified radical hysterectomy, bilateral salpingo-oophorectomy, rectosigmoid resection with end-to-end reanastomosis, gastrocolic omentectomy, peritoneal resection  Residual tumor identified in the ovaries  Patient ID: Radha Harrison is a 77 y o  female  76-year-old returns after surgery  She is recovering well  She is having normal bowel movements  She is voiding spontaneously  She is ambulating  No need for narcotic therapy  No vaginal bleeding  No other interval change in her medications or medical history since her last visit  The following portions of the patient's history were reviewed and updated as appropriate: allergies, current medications, past family history, past medical history, past social history, past surgical history and problem list     Review of Systems      Current Outpatient Prescriptions:     docusate sodium (COLACE) 100 mg capsule, Take 1 capsule (100 mg total) by mouth 2 (two) times a day, Disp: 10 capsule, Rfl: 0    polyethylene glycol (MIRALAX) 17 g packet, Take 17 g by mouth daily, Disp: 14 each, Rfl: 0    rivaroxaban (XARELTO) 20 mg tablet, Take 1 tablet (20 mg total) by mouth daily with breakfast, Disp: 30 tablet, Rfl: 3    Objective:    Blood pressure 150/80, pulse (!) 110, temperature 98 1 °F (36 7 °C), temperature source Oral, resp  rate 16, height 5' 2" (1 575 m), weight 73 kg (161 lb)  Body mass index is 29 45 kg/m²  Body surface area is 1 74 meters squared  Physical Exam   Constitutional: She appears well-developed and well-nourished  Abdominal: Soft  She exhibits no distension  There is no tenderness  There is no rebound and no guarding     Skin:   Incision clean, dry, and intact

## 2018-12-06 NOTE — PROGRESS NOTES
Assessment/Plan:    Problem List Items Addressed This Visit        Cardiovascular and Mediastinum    DVT, lower extremity, distal, acute, left ([facility] [address] )     Will assess lower extremity Dopplers prior to removal of IVC filter  Continue Xarelto         Relevant Orders    VAS lower limb venous duplex study, complete bilateral       Genitourinary    Malignant neoplasm of both ovaries ([facility] [address] ) - Primary     77year-old recovering well from cytoreductive surgery  Intraoperative impression was infiltration of omentum and mesorectum  Residual tumor identified in the ovaries  Her performance status is 0   1  I discussed the risks and benefits of restarting chemotherapy with carboplatin at AUC 5 and Taxol 175 milligrams/meter squared every 3 weeks to reduce hematologic toxicity from the weekly regimen  She understands the rationale for changing the regimen and agrees to proceed as outlined  2  I discussed ongoing activity limitations  3  Return as per her chemotherapy calender for follow-up  CHIEF COMPLAINT:  Postoperative evaluation       Problem:  Stage IIIC ovarian cancer    Previous therapy:     Malignant neoplasm of both ovaries ([facility] [address] )    8/16/2018 Biopsy     Paracentesis, metastatic adenocarcinoma, consistent with gyn primary          Chemotherapy     Neoadjuvant chemotherapy with taxol 80 mg/m2 weekly and carboplatin AUC 6 every 21 days  She received 2 cycles and is scheduled for cycle 3           11/23/2018 Surgery     Diagnostic laparoscopy, exploratory laparotomy, modified radical hysterectomy, bilateral salpingo-oophorectomy, rectosigmoid resection with end-to-end reanastomosis, gastrocolic omentectomy, peritoneal resection  Residual tumor identified in the ovaries  Patient ID: Bautista Galeas is a 77 y o  female  69-year-old returns after surgery  She is recovering well  She is having normal bowel movements  She is voiding spontaneously  She is ambulating    No need for narcotic therapy  No vaginal bleeding  No other interval change in her medications or medical history since her last visit  The following portions of the patient's history were reviewed and updated as appropriate: allergies, current medications, past family history, past medical history, past social history, past surgical history and problem list     Review of Systems      Current Outpatient Prescriptions:     docusate sodium (COLACE) 100 mg capsule, Take 1 capsule (100 mg total) by mouth 2 (two) times a day, Disp: 10 capsule, Rfl: 0    polyethylene glycol (MIRALAX) 17 g packet, Take 17 g by mouth daily, Disp: 14 each, Rfl: 0    rivaroxaban (XARELTO) 20 mg tablet, Take 1 tablet (20 mg total) by mouth daily with breakfast, Disp: 30 tablet, Rfl: 3    Objective:    Blood pressure 150/80, pulse (!) 110, temperature 98 1 °F (36 7 °C), temperature source Oral, resp  rate 16, height 5' 2" (1 575 m), weight 73 kg (161 lb)  Body mass index is 29 45 kg/m²  Body surface area is 1 74 meters squared  Physical Exam   Constitutional: She appears well-developed and well-nourished  Abdominal: Soft  She exhibits no distension  There is no tenderness  There is no rebound and no guarding     Skin:   Incision clean, dry, and intact

## 2018-12-06 NOTE — ASSESSMENT & PLAN NOTE
65-year-old recovering well from cytoreductive surgery  Intraoperative impression was infiltration of omentum and mesorectum  Residual tumor identified in the ovaries  Her performance status is 0   1  I discussed the risks and benefits of restarting chemotherapy with carboplatin at AUC 5 and Taxol 175 milligrams/meter squared every 3 weeks to reduce hematologic toxicity from the weekly regimen  She understands the rationale for changing the regimen and agrees to proceed as outlined  2  I discussed ongoing activity limitations  3  Return as per her chemotherapy calender for follow-up

## 2018-12-12 ENCOUNTER — HOSPITAL ENCOUNTER (OUTPATIENT)
Dept: INFUSION CENTER | Facility: HOSPITAL | Age: 78
Discharge: HOME | End: 2018-12-12

## 2018-12-12 ENCOUNTER — TRANSCRIBE ORDERS (OUTPATIENT)
Dept: ADMINISTRATIVE | Facility: HOSPITAL | Age: 78
End: 2018-12-12

## 2018-12-12 ENCOUNTER — APPOINTMENT (OUTPATIENT)
Dept: LAB | Facility: HOSPITAL | Age: 78
End: 2018-12-12
Payer: MEDICARE

## 2018-12-12 ENCOUNTER — HOSPITAL ENCOUNTER (OUTPATIENT)
Dept: RADIOLOGY | Facility: HOSPITAL | Age: 78
Discharge: HOME | End: 2018-12-12
Attending: OBSTETRICS & GYNECOLOGY
Payer: MEDICARE

## 2018-12-12 DIAGNOSIS — C56.9 MALIGNANT NEOPLASM OF OVARY, UNSPECIFIED LATERALITY (HCC): ICD-10-CM

## 2018-12-12 DIAGNOSIS — I82.4Z2 DVT, LOWER EXTREMITY, DISTAL, ACUTE, LEFT (HCC): ICD-10-CM

## 2018-12-12 DIAGNOSIS — C56.9 MALIGNANT NEOPLASM OF OVARY, UNSPECIFIED LATERALITY (HCC): Primary | ICD-10-CM

## 2018-12-12 LAB
ALBUMIN SERPL BCP-MCNC: 3.3 G/DL (ref 3.5–5)
ALP SERPL-CCNC: 130 U/L (ref 46–116)
ALT SERPL W P-5'-P-CCNC: 12 U/L (ref 12–78)
ANION GAP SERPL CALCULATED.3IONS-SCNC: 8 MMOL/L (ref 4–13)
AST SERPL W P-5'-P-CCNC: 15 U/L (ref 5–45)
BASOPHILS # BLD AUTO: 0.05 THOUSANDS/ΜL (ref 0–0.1)
BASOPHILS NFR BLD AUTO: 1 % (ref 0–1)
BILIRUB SERPL-MCNC: 0.2 MG/DL (ref 0.2–1)
BUN SERPL-MCNC: 18 MG/DL (ref 5–25)
CALCIUM SERPL-MCNC: 9.4 MG/DL (ref 8.3–10.1)
CANCER AG125 SERPL-ACNC: 25.9 U/ML (ref 0–30)
CHLORIDE SERPL-SCNC: 104 MMOL/L (ref 100–108)
CO2 SERPL-SCNC: 30 MMOL/L (ref 21–32)
CREAT SERPL-MCNC: 0.73 MG/DL (ref 0.6–1.3)
EOSINOPHIL # BLD AUTO: 0.15 THOUSAND/ΜL (ref 0–0.61)
EOSINOPHIL NFR BLD AUTO: 1 % (ref 0–6)
ERYTHROCYTE [DISTWIDTH] IN BLOOD BY AUTOMATED COUNT: 17.1 % (ref 11.6–15.1)
GFR SERPL CREATININE-BSD FRML MDRD: 79 ML/MIN/1.73SQ M
GLUCOSE SERPL-MCNC: 92 MG/DL (ref 65–140)
HCT VFR BLD AUTO: 35.3 % (ref 34.8–46.1)
HGB BLD-MCNC: 10.9 G/DL (ref 11.5–15.4)
IMM GRANULOCYTES # BLD AUTO: 0.05 THOUSAND/UL (ref 0–0.2)
IMM GRANULOCYTES NFR BLD AUTO: 1 % (ref 0–2)
LYMPHOCYTES # BLD AUTO: 1.94 THOUSANDS/ΜL (ref 0.6–4.47)
LYMPHOCYTES NFR BLD AUTO: 18 % (ref 14–44)
MAGNESIUM SERPL-MCNC: 1.8 MG/DL (ref 1.6–2.6)
MCH RBC QN AUTO: 28.3 PG (ref 26.8–34.3)
MCHC RBC AUTO-ENTMCNC: 30.9 G/DL (ref 31.4–37.4)
MCV RBC AUTO: 92 FL (ref 82–98)
MONOCYTES # BLD AUTO: 1.16 THOUSAND/ΜL (ref 0.17–1.22)
MONOCYTES NFR BLD AUTO: 11 % (ref 4–12)
NEUTROPHILS # BLD AUTO: 7.48 THOUSANDS/ΜL (ref 1.85–7.62)
NEUTS SEG NFR BLD AUTO: 68 % (ref 43–75)
NRBC BLD AUTO-RTO: 0 /100 WBCS
PLATELET # BLD AUTO: 372 THOUSANDS/UL (ref 149–390)
PMV BLD AUTO: 9.5 FL (ref 8.9–12.7)
POTASSIUM SERPL-SCNC: 3.9 MMOL/L (ref 3.5–5.3)
PROT SERPL-MCNC: 7.5 G/DL (ref 6.4–8.2)
RBC # BLD AUTO: 3.85 MILLION/UL (ref 3.81–5.12)
SODIUM SERPL-SCNC: 142 MMOL/L (ref 136–145)
WBC # BLD AUTO: 10.83 THOUSAND/UL (ref 4.31–10.16)

## 2018-12-12 PROCEDURE — 80053 COMPREHEN METABOLIC PANEL: CPT | Performed by: PHYSICIAN ASSISTANT

## 2018-12-12 PROCEDURE — 83735 ASSAY OF MAGNESIUM: CPT

## 2018-12-12 PROCEDURE — 85025 COMPLETE CBC W/AUTO DIFF WBC: CPT | Performed by: PHYSICIAN ASSISTANT

## 2018-12-12 PROCEDURE — 36415 COLL VENOUS BLD VENIPUNCTURE: CPT | Performed by: PHYSICIAN ASSISTANT

## 2018-12-12 PROCEDURE — 93970 EXTREMITY STUDY: CPT

## 2018-12-12 PROCEDURE — 93970 EXTREMITY STUDY: CPT | Performed by: SURGERY

## 2018-12-12 PROCEDURE — 86304 IMMUNOASSAY TUMOR CA 125: CPT

## 2018-12-13 RX ORDER — SODIUM CHLORIDE 9 MG/ML
20 INJECTION, SOLUTION INTRAVENOUS ONCE
Status: COMPLETED | OUTPATIENT
Start: 2018-12-14 | End: 2018-12-14

## 2018-12-13 RX ORDER — PALONOSETRON 0.05 MG/ML
0.25 INJECTION, SOLUTION INTRAVENOUS ONCE
Status: COMPLETED | OUTPATIENT
Start: 2018-12-14 | End: 2018-12-14

## 2018-12-14 ENCOUNTER — HOSPITAL ENCOUNTER (OUTPATIENT)
Dept: INFUSION CENTER | Facility: HOSPITAL | Age: 78
Discharge: HOME | End: 2018-12-14
Payer: MEDICARE

## 2018-12-14 VITALS
OXYGEN SATURATION: 100 % | SYSTOLIC BLOOD PRESSURE: 123 MMHG | BODY MASS INDEX: 29.62 KG/M2 | DIASTOLIC BLOOD PRESSURE: 61 MMHG | RESPIRATION RATE: 18 BRPM | HEART RATE: 106 BPM | HEIGHT: 62 IN | WEIGHT: 160.94 LBS | TEMPERATURE: 98.3 F

## 2018-12-14 PROCEDURE — 96413 CHEMO IV INFUSION 1 HR: CPT

## 2018-12-14 PROCEDURE — 96415 CHEMO IV INFUSION ADDL HR: CPT

## 2018-12-14 PROCEDURE — 96367 TX/PROPH/DG ADDL SEQ IV INF: CPT

## 2018-12-14 PROCEDURE — 96375 TX/PRO/DX INJ NEW DRUG ADDON: CPT

## 2018-12-14 PROCEDURE — 96417 CHEMO IV INFUS EACH ADDL SEQ: CPT

## 2018-12-14 RX ADMIN — Medication 300 UNITS: at 14:59

## 2018-12-14 RX ADMIN — DEXAMETHASONE SODIUM PHOSPHATE 20 MG: 10 INJECTION, SOLUTION INTRAMUSCULAR; INTRAVENOUS at 08:52

## 2018-12-14 RX ADMIN — DIPHENHYDRAMINE HYDROCHLORIDE 25 MG: 50 INJECTION, SOLUTION INTRAMUSCULAR; INTRAVENOUS at 09:33

## 2018-12-14 RX ADMIN — PALONOSETRON HYDROCHLORIDE 0.25 MG: 0.25 INJECTION, SOLUTION INTRAVENOUS at 10:28

## 2018-12-14 RX ADMIN — SODIUM CHLORIDE 20 ML/HR: 0.9 INJECTION, SOLUTION INTRAVENOUS at 08:52

## 2018-12-14 RX ADMIN — SODIUM CHLORIDE 150 MG: 0.9 INJECTION, SOLUTION INTRAVENOUS at 09:55

## 2018-12-14 RX ADMIN — FAMOTIDINE 20 MG: 10 INJECTION, SOLUTION INTRAVENOUS at 09:12

## 2018-12-14 RX ADMIN — PACLITAXEL 305 MG: 6 INJECTION, SOLUTION INTRAVENOUS at 10:35

## 2018-12-14 RX ADMIN — CARBOPLATIN 419 MG: 10 INJECTION, SOLUTION INTRAVENOUS at 13:52

## 2018-12-19 ENCOUNTER — APPOINTMENT (OUTPATIENT)
Dept: LAB | Facility: HOSPITAL | Age: 78
End: 2018-12-19
Payer: MEDICARE

## 2018-12-19 ENCOUNTER — HOSPITAL ENCOUNTER (OUTPATIENT)
Dept: INFUSION CENTER | Facility: HOSPITAL | Age: 78
Discharge: HOME | End: 2018-12-19

## 2018-12-19 DIAGNOSIS — C56.9 MALIGNANT NEOPLASM OF OVARY, UNSPECIFIED LATERALITY (HCC): ICD-10-CM

## 2018-12-19 LAB
ALBUMIN SERPL BCP-MCNC: 3.4 G/DL (ref 3.5–5)
ALP SERPL-CCNC: 127 U/L (ref 46–116)
ALT SERPL W P-5'-P-CCNC: 27 U/L (ref 12–78)
ANION GAP SERPL CALCULATED.3IONS-SCNC: 13 MMOL/L (ref 4–13)
AST SERPL W P-5'-P-CCNC: 23 U/L (ref 5–45)
BASOPHILS # BLD MANUAL: 0 THOUSAND/UL (ref 0–0.1)
BASOPHILS NFR MAR MANUAL: 0 % (ref 0–1)
BILIRUB SERPL-MCNC: 0.4 MG/DL (ref 0.2–1)
BUN SERPL-MCNC: 22 MG/DL (ref 5–25)
CALCIUM SERPL-MCNC: 9.3 MG/DL (ref 8.3–10.1)
CHLORIDE SERPL-SCNC: 100 MMOL/L (ref 100–108)
CO2 SERPL-SCNC: 25 MMOL/L (ref 21–32)
CREAT SERPL-MCNC: 0.9 MG/DL (ref 0.6–1.3)
EOSINOPHIL # BLD MANUAL: 0.11 THOUSAND/UL (ref 0–0.4)
EOSINOPHIL NFR BLD MANUAL: 3 % (ref 0–6)
ERYTHROCYTE [DISTWIDTH] IN BLOOD BY AUTOMATED COUNT: 15 % (ref 11.6–15.1)
GFR SERPL CREATININE-BSD FRML MDRD: 61 ML/MIN/1.73SQ M
GLUCOSE SERPL-MCNC: 128 MG/DL (ref 65–140)
HCT VFR BLD AUTO: 36.8 % (ref 34.8–46.1)
HGB BLD-MCNC: 11.4 G/DL (ref 11.5–15.4)
LYMPHOCYTES # BLD AUTO: 1.33 THOUSAND/UL (ref 0.6–4.47)
LYMPHOCYTES # BLD AUTO: 38 % (ref 14–44)
MAGNESIUM SERPL-MCNC: 1.9 MG/DL (ref 1.6–2.6)
MCH RBC QN AUTO: 27.8 PG (ref 26.8–34.3)
MCHC RBC AUTO-ENTMCNC: 31 G/DL (ref 31.4–37.4)
MCV RBC AUTO: 90 FL (ref 82–98)
MONOCYTES # BLD AUTO: 0.04 THOUSAND/UL (ref 0–1.22)
MONOCYTES NFR BLD: 1 % (ref 4–12)
NEUTROPHILS # BLD MANUAL: 2.03 THOUSAND/UL (ref 1.85–7.62)
NEUTS BAND NFR BLD MANUAL: 7 % (ref 0–8)
NEUTS SEG NFR BLD AUTO: 51 % (ref 43–75)
NRBC BLD AUTO-RTO: 0 /100 WBCS
PLATELET # BLD AUTO: 217 THOUSANDS/UL (ref 149–390)
PLATELET BLD QL SMEAR: ADEQUATE
PMV BLD AUTO: 10.2 FL (ref 8.9–12.7)
POTASSIUM SERPL-SCNC: 3.8 MMOL/L (ref 3.5–5.3)
PROT SERPL-MCNC: 7.5 G/DL (ref 6.4–8.2)
RBC # BLD AUTO: 4.1 MILLION/UL (ref 3.81–5.12)
RBC MORPH BLD: NORMAL
SODIUM SERPL-SCNC: 138 MMOL/L (ref 136–145)
TOTAL CELLS COUNTED SPEC: 100
WBC # BLD AUTO: 3.5 THOUSAND/UL (ref 4.31–10.16)

## 2018-12-19 PROCEDURE — 85027 COMPLETE CBC AUTOMATED: CPT | Performed by: PHYSICIAN ASSISTANT

## 2018-12-19 PROCEDURE — 85007 BL SMEAR W/DIFF WBC COUNT: CPT | Performed by: PHYSICIAN ASSISTANT

## 2018-12-19 PROCEDURE — 80053 COMPREHEN METABOLIC PANEL: CPT | Performed by: PHYSICIAN ASSISTANT

## 2018-12-19 PROCEDURE — 83735 ASSAY OF MAGNESIUM: CPT

## 2018-12-19 PROCEDURE — 36415 COLL VENOUS BLD VENIPUNCTURE: CPT | Performed by: PHYSICIAN ASSISTANT

## 2018-12-26 ENCOUNTER — APPOINTMENT (OUTPATIENT)
Dept: LAB | Facility: HOSPITAL | Age: 78
End: 2018-12-26
Payer: MEDICARE

## 2018-12-26 DIAGNOSIS — C56.9 MALIGNANT NEOPLASM OF OVARY, UNSPECIFIED LATERALITY (HCC): ICD-10-CM

## 2018-12-26 LAB
ALBUMIN SERPL BCP-MCNC: 3.1 G/DL (ref 3.5–5)
ALP SERPL-CCNC: 146 U/L (ref 46–116)
ALT SERPL W P-5'-P-CCNC: 14 U/L (ref 12–78)
ANION GAP SERPL CALCULATED.3IONS-SCNC: 7 MMOL/L (ref 4–13)
AST SERPL W P-5'-P-CCNC: 12 U/L (ref 5–45)
BASOPHILS # BLD AUTO: 0.02 THOUSANDS/ΜL (ref 0–0.1)
BASOPHILS NFR BLD AUTO: 1 % (ref 0–1)
BILIRUB SERPL-MCNC: 0.2 MG/DL (ref 0.2–1)
BUN SERPL-MCNC: 21 MG/DL (ref 5–25)
CALCIUM SERPL-MCNC: 9 MG/DL (ref 8.3–10.1)
CHLORIDE SERPL-SCNC: 104 MMOL/L (ref 100–108)
CO2 SERPL-SCNC: 29 MMOL/L (ref 21–32)
CREAT SERPL-MCNC: 0.75 MG/DL (ref 0.6–1.3)
EOSINOPHIL # BLD AUTO: 0.08 THOUSAND/ΜL (ref 0–0.61)
EOSINOPHIL NFR BLD AUTO: 3 % (ref 0–6)
ERYTHROCYTE [DISTWIDTH] IN BLOOD BY AUTOMATED COUNT: 14.6 % (ref 11.6–15.1)
GFR SERPL CREATININE-BSD FRML MDRD: 76 ML/MIN/1.73SQ M
GLUCOSE SERPL-MCNC: 91 MG/DL (ref 65–140)
HCT VFR BLD AUTO: 32.5 % (ref 34.8–46.1)
HGB BLD-MCNC: 9.8 G/DL (ref 11.5–15.4)
IMM GRANULOCYTES # BLD AUTO: 0.01 THOUSAND/UL (ref 0–0.2)
IMM GRANULOCYTES NFR BLD AUTO: 0 % (ref 0–2)
LYMPHOCYTES # BLD AUTO: 1.61 THOUSANDS/ΜL (ref 0.6–4.47)
LYMPHOCYTES NFR BLD AUTO: 55 % (ref 14–44)
MAGNESIUM SERPL-MCNC: 2 MG/DL (ref 1.6–2.6)
MCH RBC QN AUTO: 27.7 PG (ref 26.8–34.3)
MCHC RBC AUTO-ENTMCNC: 30.2 G/DL (ref 31.4–37.4)
MCV RBC AUTO: 92 FL (ref 82–98)
MONOCYTES # BLD AUTO: 0.63 THOUSAND/ΜL (ref 0.17–1.22)
MONOCYTES NFR BLD AUTO: 22 % (ref 4–12)
NEUTROPHILS # BLD AUTO: 0.56 THOUSANDS/ΜL (ref 1.85–7.62)
NEUTS SEG NFR BLD AUTO: 19 % (ref 43–75)
NRBC BLD AUTO-RTO: 0 /100 WBCS
PLATELET # BLD AUTO: 235 THOUSANDS/UL (ref 149–390)
PMV BLD AUTO: 9.3 FL (ref 8.9–12.7)
POTASSIUM SERPL-SCNC: 3.9 MMOL/L (ref 3.5–5.3)
PROT SERPL-MCNC: 6.7 G/DL (ref 6.4–8.2)
RBC # BLD AUTO: 3.54 MILLION/UL (ref 3.81–5.12)
SODIUM SERPL-SCNC: 140 MMOL/L (ref 136–145)
WBC # BLD AUTO: 2.91 THOUSAND/UL (ref 4.31–10.16)

## 2018-12-26 PROCEDURE — 36415 COLL VENOUS BLD VENIPUNCTURE: CPT | Performed by: PHYSICIAN ASSISTANT

## 2018-12-26 PROCEDURE — 83735 ASSAY OF MAGNESIUM: CPT

## 2018-12-26 PROCEDURE — 80053 COMPREHEN METABOLIC PANEL: CPT | Performed by: PHYSICIAN ASSISTANT

## 2018-12-26 PROCEDURE — 85025 COMPLETE CBC W/AUTO DIFF WBC: CPT | Performed by: PHYSICIAN ASSISTANT

## 2018-12-27 ENCOUNTER — APPOINTMENT (OUTPATIENT)
Dept: LAB | Facility: HOSPITAL | Age: 78
End: 2018-12-27
Attending: OBSTETRICS & GYNECOLOGY
Payer: MEDICARE

## 2018-12-27 ENCOUNTER — TRANSCRIBE ORDERS (OUTPATIENT)
Dept: ADMINISTRATIVE | Facility: HOSPITAL | Age: 78
End: 2018-12-27

## 2018-12-27 ENCOUNTER — TELEPHONE (OUTPATIENT)
Dept: GYNECOLOGIC ONCOLOGY | Facility: CLINIC | Age: 78
End: 2018-12-27

## 2018-12-27 DIAGNOSIS — C56.9 MALIGNANT NEOPLASM OF OVARY, UNSPECIFIED LATERALITY (HCC): Primary | ICD-10-CM

## 2018-12-27 DIAGNOSIS — C56.9 MALIGNANT NEOPLASM OF OVARY, UNSPECIFIED LATERALITY (HCC): ICD-10-CM

## 2018-12-27 LAB
ALBUMIN SERPL BCP-MCNC: 3.1 G/DL (ref 3.5–5)
ALP SERPL-CCNC: 143 U/L (ref 46–116)
ALT SERPL W P-5'-P-CCNC: 14 U/L (ref 12–78)
ANION GAP SERPL CALCULATED.3IONS-SCNC: 8 MMOL/L (ref 4–13)
AST SERPL W P-5'-P-CCNC: 14 U/L (ref 5–45)
BASOPHILS # BLD AUTO: 0.03 THOUSANDS/ΜL (ref 0–0.1)
BASOPHILS NFR BLD AUTO: 1 % (ref 0–1)
BILIRUB SERPL-MCNC: 0.1 MG/DL (ref 0.2–1)
BUN SERPL-MCNC: 19 MG/DL (ref 5–25)
CALCIUM SERPL-MCNC: 8.9 MG/DL (ref 8.3–10.1)
CHLORIDE SERPL-SCNC: 104 MMOL/L (ref 100–108)
CO2 SERPL-SCNC: 29 MMOL/L (ref 21–32)
CREAT SERPL-MCNC: 0.73 MG/DL (ref 0.6–1.3)
EOSINOPHIL # BLD AUTO: 0.12 THOUSAND/ΜL (ref 0–0.61)
EOSINOPHIL NFR BLD AUTO: 4 % (ref 0–6)
ERYTHROCYTE [DISTWIDTH] IN BLOOD BY AUTOMATED COUNT: 14.5 % (ref 11.6–15.1)
GFR SERPL CREATININE-BSD FRML MDRD: 79 ML/MIN/1.73SQ M
GLUCOSE SERPL-MCNC: 87 MG/DL (ref 65–140)
HCT VFR BLD AUTO: 32.5 % (ref 34.8–46.1)
HGB BLD-MCNC: 9.9 G/DL (ref 11.5–15.4)
IMM GRANULOCYTES # BLD AUTO: 0.01 THOUSAND/UL (ref 0–0.2)
IMM GRANULOCYTES NFR BLD AUTO: 0 % (ref 0–2)
LYMPHOCYTES # BLD AUTO: 1.93 THOUSANDS/ΜL (ref 0.6–4.47)
LYMPHOCYTES NFR BLD AUTO: 59 % (ref 14–44)
MAGNESIUM SERPL-MCNC: 1.8 MG/DL (ref 1.6–2.6)
MCH RBC QN AUTO: 27.9 PG (ref 26.8–34.3)
MCHC RBC AUTO-ENTMCNC: 30.5 G/DL (ref 31.4–37.4)
MCV RBC AUTO: 92 FL (ref 82–98)
MONOCYTES # BLD AUTO: 0.82 THOUSAND/ΜL (ref 0.17–1.22)
MONOCYTES NFR BLD AUTO: 26 % (ref 4–12)
NEUTROPHILS # BLD AUTO: 0.31 THOUSANDS/ΜL (ref 1.85–7.62)
NEUTS SEG NFR BLD AUTO: 10 % (ref 43–75)
NRBC BLD AUTO-RTO: 0 /100 WBCS
PLATELET # BLD AUTO: 245 THOUSANDS/UL (ref 149–390)
PMV BLD AUTO: 9.3 FL (ref 8.9–12.7)
POTASSIUM SERPL-SCNC: 3.7 MMOL/L (ref 3.5–5.3)
PROT SERPL-MCNC: 6.9 G/DL (ref 6.4–8.2)
RBC # BLD AUTO: 3.55 MILLION/UL (ref 3.81–5.12)
SODIUM SERPL-SCNC: 141 MMOL/L (ref 136–145)
WBC # BLD AUTO: 3.22 THOUSAND/UL (ref 4.31–10.16)

## 2018-12-27 PROCEDURE — 86304 IMMUNOASSAY TUMOR CA 125: CPT

## 2018-12-27 PROCEDURE — 80053 COMPREHEN METABOLIC PANEL: CPT

## 2018-12-27 PROCEDURE — 85025 COMPLETE CBC W/AUTO DIFF WBC: CPT

## 2018-12-27 PROCEDURE — 36415 COLL VENOUS BLD VENIPUNCTURE: CPT

## 2018-12-27 PROCEDURE — 83735 ASSAY OF MAGNESIUM: CPT

## 2018-12-27 NOTE — TELEPHONE ENCOUNTER
[address] on labs drawn yesterday  Patient will have repeat CBC drawn tomorrow (she is not able to make it out today)  Infection precautions given

## 2018-12-28 LAB — CANCER AG125 SERPL-ACNC: 14.2 U/ML (ref 0–30)

## 2019-01-01 ENCOUNTER — TRANSCRIBE ORDERS (OUTPATIENT)
Dept: ADMINISTRATIVE | Facility: HOSPITAL | Age: 79
End: 2019-01-01

## 2019-01-01 ENCOUNTER — HOSPITAL ENCOUNTER (OUTPATIENT)
Dept: INFUSION CENTER | Facility: HOSPITAL | Age: 79
Discharge: HOME | End: 2019-12-06
Attending: OBSTETRICS & GYNECOLOGY
Payer: MEDICARE

## 2019-01-01 ENCOUNTER — HOSPITAL ENCOUNTER (OUTPATIENT)
Dept: RADIOLOGY | Facility: HOSPITAL | Age: 79
Discharge: HOME | End: 2019-09-10
Payer: MEDICARE

## 2019-01-01 ENCOUNTER — APPOINTMENT (OUTPATIENT)
Dept: LAB | Facility: HOSPITAL | Age: 79
End: 2019-01-01
Payer: MEDICARE

## 2019-01-01 ENCOUNTER — HOSPITAL ENCOUNTER (OUTPATIENT)
Dept: RADIOLOGY | Facility: HOSPITAL | Age: 79
Discharge: HOME | End: 2019-08-30
Payer: MEDICARE

## 2019-01-01 ENCOUNTER — HOSPITAL ENCOUNTER (OUTPATIENT)
Dept: INFUSION CENTER | Facility: HOSPITAL | Age: 79
Discharge: HOME | End: 2019-10-25
Attending: OBSTETRICS & GYNECOLOGY
Payer: MEDICARE

## 2019-01-01 ENCOUNTER — HOSPITAL ENCOUNTER (OUTPATIENT)
Dept: NON INVASIVE DIAGNOSTICS | Facility: HOSPITAL | Age: 79
Discharge: HOME | End: 2019-09-30
Attending: OBSTETRICS & GYNECOLOGY | Admitting: RADIOLOGY
Payer: MEDICARE

## 2019-01-01 ENCOUNTER — HOSPITAL ENCOUNTER (OUTPATIENT)
Dept: RADIOLOGY | Facility: HOSPITAL | Age: 79
Discharge: HOME | End: 2019-09-17

## 2019-01-01 ENCOUNTER — HOSPITAL ENCOUNTER (OUTPATIENT)
Dept: INFUSION CENTER | Facility: HOSPITAL | Age: 79
Discharge: HOME | End: 2019-11-08
Attending: OBSTETRICS & GYNECOLOGY
Payer: MEDICARE

## 2019-01-01 ENCOUNTER — TELEPHONE (OUTPATIENT)
Dept: RADIOLOGY | Facility: HOSPITAL | Age: 79
End: 2019-01-01

## 2019-01-01 ENCOUNTER — OFFICE VISIT (OUTPATIENT)
Dept: GYNECOLOGIC ONCOLOGY | Facility: CLINIC | Age: 79
End: 2019-01-01
Payer: MEDICARE

## 2019-01-01 ENCOUNTER — HOSPITAL ENCOUNTER (OUTPATIENT)
Dept: INFUSION CENTER | Facility: HOSPITAL | Age: 79
Discharge: HOME | End: 2019-11-22
Attending: OBSTETRICS & GYNECOLOGY

## 2019-01-01 ENCOUNTER — HOSPITAL ENCOUNTER (OUTPATIENT)
Dept: RADIOLOGY | Facility: HOSPITAL | Age: 79
Discharge: HOME | End: 2019-12-27
Payer: MEDICARE

## 2019-01-01 ENCOUNTER — APPOINTMENT (OUTPATIENT)
Dept: LAB | Facility: HOSPITAL | Age: 79
End: 2019-01-01
Attending: OBSTETRICS & GYNECOLOGY
Payer: MEDICARE

## 2019-01-01 ENCOUNTER — TELEPHONE (OUTPATIENT)
Dept: MAMMOGRAPHY | Facility: CLINIC | Age: 79
End: 2019-01-01

## 2019-01-01 ENCOUNTER — HOSPITAL ENCOUNTER (OUTPATIENT)
Dept: INFUSION CENTER | Facility: HOSPITAL | Age: 79
Discharge: HOME | End: 2019-10-11
Attending: OBSTETRICS & GYNECOLOGY
Payer: MEDICARE

## 2019-01-01 ENCOUNTER — HOSPITAL ENCOUNTER (OUTPATIENT)
Dept: INFUSION CENTER | Facility: HOSPITAL | Age: 79
Discharge: HOME | End: 2019-12-20
Attending: OBSTETRICS & GYNECOLOGY
Payer: MEDICARE

## 2019-01-01 ENCOUNTER — HOSPITAL ENCOUNTER (OUTPATIENT)
Dept: RADIOLOGY | Facility: HOSPITAL | Age: 79
Discharge: HOME | End: 2019-09-13
Payer: MEDICARE

## 2019-01-01 ENCOUNTER — HOSPITAL ENCOUNTER (OUTPATIENT)
Dept: RADIOLOGY | Facility: HOSPITAL | Age: 79
Discharge: HOME | End: 2019-10-02
Attending: OBSTETRICS & GYNECOLOGY
Payer: MEDICARE

## 2019-01-01 ENCOUNTER — HOSPITAL ENCOUNTER (OUTPATIENT)
Dept: RADIOLOGY | Facility: HOSPITAL | Age: 79
Discharge: HOME | End: 2019-09-17
Payer: MEDICARE

## 2019-01-01 VITALS
DIASTOLIC BLOOD PRESSURE: 89 MMHG | HEIGHT: 62 IN | WEIGHT: 168.4 LBS | SYSTOLIC BLOOD PRESSURE: 150 MMHG | TEMPERATURE: 97.5 F | BODY MASS INDEX: 30.99 KG/M2 | HEART RATE: 99 BPM

## 2019-01-01 VITALS
WEIGHT: 158 LBS | TEMPERATURE: 97.1 F | SYSTOLIC BLOOD PRESSURE: 160 MMHG | BODY MASS INDEX: 29.08 KG/M2 | HEART RATE: 97 BPM | HEIGHT: 62 IN | DIASTOLIC BLOOD PRESSURE: 84 MMHG

## 2019-01-01 VITALS
TEMPERATURE: 97.8 F | HEIGHT: 62 IN | HEART RATE: 98 BPM | OXYGEN SATURATION: 99 % | SYSTOLIC BLOOD PRESSURE: 156 MMHG | RESPIRATION RATE: 20 BRPM | DIASTOLIC BLOOD PRESSURE: 74 MMHG | WEIGHT: 171.08 LBS | BODY MASS INDEX: 31.48 KG/M2

## 2019-01-01 VITALS
SYSTOLIC BLOOD PRESSURE: 152 MMHG | WEIGHT: 168.1 LBS | HEART RATE: 90 BPM | RESPIRATION RATE: 20 BRPM | BODY MASS INDEX: 30.93 KG/M2 | TEMPERATURE: 98.1 F | DIASTOLIC BLOOD PRESSURE: 74 MMHG | HEIGHT: 62 IN

## 2019-01-01 VITALS
BODY MASS INDEX: 28.71 KG/M2 | DIASTOLIC BLOOD PRESSURE: 82 MMHG | HEART RATE: 84 BPM | WEIGHT: 156 LBS | HEIGHT: 62 IN | SYSTOLIC BLOOD PRESSURE: 162 MMHG | TEMPERATURE: 97.6 F

## 2019-01-01 VITALS
DIASTOLIC BLOOD PRESSURE: 64 MMHG | WEIGHT: 167.33 LBS | BODY MASS INDEX: 30.79 KG/M2 | TEMPERATURE: 97.2 F | OXYGEN SATURATION: 96 % | HEIGHT: 62 IN | SYSTOLIC BLOOD PRESSURE: 130 MMHG | RESPIRATION RATE: 20 BRPM | HEART RATE: 108 BPM

## 2019-01-01 VITALS
DIASTOLIC BLOOD PRESSURE: 60 MMHG | HEART RATE: 112 BPM | HEIGHT: 62 IN | RESPIRATION RATE: 24 BRPM | BODY MASS INDEX: 31.32 KG/M2 | TEMPERATURE: 97.6 F | WEIGHT: 170.19 LBS | SYSTOLIC BLOOD PRESSURE: 140 MMHG | OXYGEN SATURATION: 99 %

## 2019-01-01 VITALS — HEIGHT: 62 IN | BODY MASS INDEX: 29.08 KG/M2 | WEIGHT: 158 LBS

## 2019-01-01 VITALS
HEIGHT: 62 IN | TEMPERATURE: 98 F | BODY MASS INDEX: 31.2 KG/M2 | HEART RATE: 89 BPM | DIASTOLIC BLOOD PRESSURE: 86 MMHG | RESPIRATION RATE: 20 BRPM | WEIGHT: 169.53 LBS | SYSTOLIC BLOOD PRESSURE: 133 MMHG | OXYGEN SATURATION: 99 %

## 2019-01-01 VITALS
OXYGEN SATURATION: 95 % | HEART RATE: 105 BPM | DIASTOLIC BLOOD PRESSURE: 78 MMHG | RESPIRATION RATE: 16 BRPM | TEMPERATURE: 97.7 F | SYSTOLIC BLOOD PRESSURE: 180 MMHG

## 2019-01-01 VITALS
BODY MASS INDEX: 31.1 KG/M2 | HEIGHT: 62 IN | HEART RATE: 96 BPM | SYSTOLIC BLOOD PRESSURE: 126 MMHG | WEIGHT: 169 LBS | TEMPERATURE: 98.3 F | DIASTOLIC BLOOD PRESSURE: 82 MMHG

## 2019-01-01 VITALS
OXYGEN SATURATION: 99 % | DIASTOLIC BLOOD PRESSURE: 70 MMHG | HEIGHT: 62 IN | RESPIRATION RATE: 18 BRPM | SYSTOLIC BLOOD PRESSURE: 140 MMHG | HEART RATE: 82 BPM | BODY MASS INDEX: 31.28 KG/M2 | TEMPERATURE: 95.8 F | WEIGHT: 169.97 LBS

## 2019-01-01 VITALS
RESPIRATION RATE: 18 BRPM | BODY MASS INDEX: 30.96 KG/M2 | OXYGEN SATURATION: 98 % | WEIGHT: 169.25 LBS | TEMPERATURE: 98.4 F | HEART RATE: 88 BPM | DIASTOLIC BLOOD PRESSURE: 67 MMHG | SYSTOLIC BLOOD PRESSURE: 143 MMHG

## 2019-01-01 VITALS
DIASTOLIC BLOOD PRESSURE: 86 MMHG | HEART RATE: 72 BPM | OXYGEN SATURATION: 97 % | RESPIRATION RATE: 18 BRPM | SYSTOLIC BLOOD PRESSURE: 179 MMHG

## 2019-01-01 DIAGNOSIS — C56.9 MALIGNANT NEOPLASM OF OVARY, UNSPECIFIED LATERALITY (HCC): ICD-10-CM

## 2019-01-01 DIAGNOSIS — C56.9 MALIGNANT NEOPLASM OF OVARY, UNSPECIFIED LATERALITY (HCC): Primary | ICD-10-CM

## 2019-01-01 DIAGNOSIS — Z12.31 ENCOUNTER FOR SCREENING MAMMOGRAM FOR BREAST CANCER: ICD-10-CM

## 2019-01-01 DIAGNOSIS — T50.905A HYPERTENSION DUE TO DRUG: ICD-10-CM

## 2019-01-01 DIAGNOSIS — Z85.43 HISTORY OF OVARIAN CANCER: Primary | ICD-10-CM

## 2019-01-01 DIAGNOSIS — I82.4Z2 DVT, LOWER EXTREMITY, DISTAL, ACUTE, LEFT (HCC): ICD-10-CM

## 2019-01-01 DIAGNOSIS — Z85.43 HISTORY OF OVARIAN CANCER: ICD-10-CM

## 2019-01-01 DIAGNOSIS — R92.8 ABNORMAL MAMMOGRAM: ICD-10-CM

## 2019-01-01 DIAGNOSIS — C56.3 MALIGNANT NEOPLASM OF BOTH OVARIES (HCC): ICD-10-CM

## 2019-01-01 DIAGNOSIS — N64.59 ABNORMAL BREAST FINDING: ICD-10-CM

## 2019-01-01 DIAGNOSIS — C56.3 MALIGNANT NEOPLASM OF BOTH OVARIES (HCC): Primary | ICD-10-CM

## 2019-01-01 DIAGNOSIS — Z85.43 ENCOUNTER FOR FOLLOW-UP SURVEILLANCE OF OVARIAN CANCER: Primary | ICD-10-CM

## 2019-01-01 DIAGNOSIS — Z08 ENCOUNTER FOR FOLLOW-UP SURVEILLANCE OF OVARIAN CANCER: Primary | ICD-10-CM

## 2019-01-01 DIAGNOSIS — R97.1 ELEVATED CA-125: ICD-10-CM

## 2019-01-01 DIAGNOSIS — R97.1 ELEVATED CA-125: Primary | ICD-10-CM

## 2019-01-01 DIAGNOSIS — Z12.31 ENCOUNTER FOR SCREENING MAMMOGRAM FOR BREAST CANCER: Primary | ICD-10-CM

## 2019-01-01 DIAGNOSIS — I15.8 HYPERTENSION DUE TO DRUG: ICD-10-CM

## 2019-01-01 LAB
ALBUMIN SERPL BCP-MCNC: 3.1 G/DL (ref 3.5–5)
ALBUMIN SERPL BCP-MCNC: 3.2 G/DL (ref 3.5–5)
ALBUMIN SERPL BCP-MCNC: 3.4 G/DL (ref 3.5–5)
ALBUMIN SERPL BCP-MCNC: 3.6 G/DL (ref 3.5–5)
ALBUMIN SERPL BCP-MCNC: 3.9 G/DL (ref 3.5–5)
ALP SERPL-CCNC: 143 U/L (ref 46–116)
ALP SERPL-CCNC: 145 U/L (ref 46–116)
ALP SERPL-CCNC: 148 U/L (ref 46–116)
ALP SERPL-CCNC: 154 U/L (ref 46–116)
ALP SERPL-CCNC: 156 U/L (ref 46–116)
ALP SERPL-CCNC: 158 U/L (ref 46–116)
ALP SERPL-CCNC: 161 U/L (ref 46–116)
ALP SERPL-CCNC: 162 U/L (ref 46–116)
ALP SERPL-CCNC: 163 U/L (ref 46–116)
ALP SERPL-CCNC: 172 U/L (ref 46–116)
ALP SERPL-CCNC: 176 U/L (ref 46–116)
ALP SERPL-CCNC: 206 U/L (ref 46–116)
ALT SERPL W P-5'-P-CCNC: 13 U/L (ref 12–78)
ALT SERPL W P-5'-P-CCNC: 14 U/L (ref 12–78)
ALT SERPL W P-5'-P-CCNC: 16 U/L (ref 12–78)
ALT SERPL W P-5'-P-CCNC: 16 U/L (ref 12–78)
ALT SERPL W P-5'-P-CCNC: 17 U/L (ref 12–78)
ALT SERPL W P-5'-P-CCNC: 18 U/L (ref 12–78)
ALT SERPL W P-5'-P-CCNC: 18 U/L (ref 12–78)
ALT SERPL W P-5'-P-CCNC: 22 U/L (ref 12–78)
ALT SERPL W P-5'-P-CCNC: 22 U/L (ref 12–78)
ALT SERPL W P-5'-P-CCNC: <6 U/L (ref 12–78)
ANION GAP SERPL CALCULATED.3IONS-SCNC: 11 MMOL/L (ref 4–13)
ANION GAP SERPL CALCULATED.3IONS-SCNC: 4 MMOL/L (ref 4–13)
ANION GAP SERPL CALCULATED.3IONS-SCNC: 4 MMOL/L (ref 4–13)
ANION GAP SERPL CALCULATED.3IONS-SCNC: 6 MMOL/L (ref 4–13)
ANION GAP SERPL CALCULATED.3IONS-SCNC: 7 MMOL/L (ref 4–13)
ANION GAP SERPL CALCULATED.3IONS-SCNC: 8 MMOL/L (ref 4–13)
ANION GAP SERPL CALCULATED.3IONS-SCNC: 8 MMOL/L (ref 4–13)
AST SERPL W P-5'-P-CCNC: 12 U/L (ref 5–45)
AST SERPL W P-5'-P-CCNC: 15 U/L (ref 5–45)
AST SERPL W P-5'-P-CCNC: 18 U/L (ref 5–45)
AST SERPL W P-5'-P-CCNC: 20 U/L (ref 5–45)
AST SERPL W P-5'-P-CCNC: 21 U/L (ref 5–45)
AST SERPL W P-5'-P-CCNC: 25 U/L (ref 5–45)
AST SERPL W P-5'-P-CCNC: 9 U/L (ref 5–45)
AST SERPL W P-5'-P-CCNC: 9 U/L (ref 5–45)
BASOPHILS # BLD AUTO: 0.02 THOUSANDS/ΜL (ref 0–0.1)
BASOPHILS # BLD AUTO: 0.03 THOUSANDS/ΜL (ref 0–0.1)
BASOPHILS # BLD AUTO: 0.04 THOUSANDS/ΜL (ref 0–0.1)
BASOPHILS # BLD AUTO: 0.05 THOUSANDS/ΜL (ref 0–0.1)
BASOPHILS # BLD AUTO: 0.06 THOUSANDS/ΜL (ref 0–0.1)
BASOPHILS NFR BLD AUTO: 0 % (ref 0–1)
BASOPHILS NFR BLD AUTO: 1 % (ref 0–1)
BILIRUB SERPL-MCNC: 0.19 MG/DL (ref 0.2–1)
BILIRUB SERPL-MCNC: 0.2 MG/DL (ref 0.2–1)
BILIRUB SERPL-MCNC: 0.2 MG/DL (ref 0.2–1)
BILIRUB SERPL-MCNC: 0.23 MG/DL (ref 0.2–1)
BILIRUB SERPL-MCNC: 0.26 MG/DL (ref 0.2–1)
BILIRUB SERPL-MCNC: 0.28 MG/DL (ref 0.2–1)
BILIRUB SERPL-MCNC: 0.3 MG/DL (ref 0.2–1)
BILIRUB SERPL-MCNC: 0.37 MG/DL (ref 0.2–1)
BILIRUB SERPL-MCNC: 0.4 MG/DL (ref 0.2–1)
BUN SERPL-MCNC: 19 MG/DL (ref 5–25)
BUN SERPL-MCNC: 21 MG/DL (ref 5–25)
BUN SERPL-MCNC: 22 MG/DL (ref 5–25)
BUN SERPL-MCNC: 23 MG/DL (ref 5–25)
BUN SERPL-MCNC: 24 MG/DL (ref 5–25)
BUN SERPL-MCNC: 24 MG/DL (ref 5–25)
BUN SERPL-MCNC: 25 MG/DL (ref 5–25)
BUN SERPL-MCNC: 25 MG/DL (ref 5–25)
BUN SERPL-MCNC: 26 MG/DL (ref 5–25)
BUN SERPL-MCNC: 28 MG/DL (ref 5–25)
BUN SERPL-MCNC: 31 MG/DL (ref 5–25)
CALCIUM SERPL-MCNC: 8.7 MG/DL (ref 8.3–10.1)
CALCIUM SERPL-MCNC: 8.8 MG/DL (ref 8.3–10.1)
CALCIUM SERPL-MCNC: 8.9 MG/DL (ref 8.3–10.1)
CALCIUM SERPL-MCNC: 9 MG/DL (ref 8.3–10.1)
CALCIUM SERPL-MCNC: 9.1 MG/DL (ref 8.3–10.1)
CALCIUM SERPL-MCNC: 9.1 MG/DL (ref 8.3–10.1)
CALCIUM SERPL-MCNC: 9.3 MG/DL (ref 8.3–10.1)
CALCIUM SERPL-MCNC: 9.4 MG/DL (ref 8.3–10.1)
CALCIUM SERPL-MCNC: 9.5 MG/DL (ref 8.3–10.1)
CALCIUM SERPL-MCNC: 9.6 MG/DL (ref 8.3–10.1)
CANCER AG125 SERPL-ACNC: 10.9 U/ML (ref 0–30)
CANCER AG125 SERPL-ACNC: 1092 U/ML (ref 0–30)
CANCER AG125 SERPL-ACNC: 158 U/ML (ref 0–30)
CANCER AG125 SERPL-ACNC: 250.7 U/ML (ref 0–30)
CANCER AG125 SERPL-ACNC: 319.3 U/ML (ref 0–30)
CANCER AG125 SERPL-ACNC: 326.5 U/ML (ref 0–30)
CANCER AG125 SERPL-ACNC: 848 U/ML (ref 0–30)
CHLORIDE SERPL-SCNC: 102 MMOL/L (ref 100–108)
CHLORIDE SERPL-SCNC: 102 MMOL/L (ref 100–108)
CHLORIDE SERPL-SCNC: 103 MMOL/L (ref 100–108)
CHLORIDE SERPL-SCNC: 104 MMOL/L (ref 100–108)
CHLORIDE SERPL-SCNC: 105 MMOL/L (ref 100–108)
CHLORIDE SERPL-SCNC: 106 MMOL/L (ref 100–108)
CHLORIDE SERPL-SCNC: 106 MMOL/L (ref 100–108)
CHLORIDE SERPL-SCNC: 108 MMOL/L (ref 100–108)
CHLORIDE SERPL-SCNC: 108 MMOL/L (ref 100–108)
CHLORIDE SERPL-SCNC: 109 MMOL/L (ref 100–108)
CO2 SERPL-SCNC: 26 MMOL/L (ref 21–32)
CO2 SERPL-SCNC: 26 MMOL/L (ref 21–32)
CO2 SERPL-SCNC: 27 MMOL/L (ref 21–32)
CO2 SERPL-SCNC: 28 MMOL/L (ref 21–32)
CO2 SERPL-SCNC: 29 MMOL/L (ref 21–32)
CO2 SERPL-SCNC: 30 MMOL/L (ref 21–32)
CREAT SERPL-MCNC: 0.72 MG/DL (ref 0.6–1.3)
CREAT SERPL-MCNC: 0.77 MG/DL (ref 0.6–1.3)
CREAT SERPL-MCNC: 0.77 MG/DL (ref 0.6–1.3)
CREAT SERPL-MCNC: 0.79 MG/DL (ref 0.6–1.3)
CREAT SERPL-MCNC: 0.81 MG/DL (ref 0.6–1.3)
CREAT SERPL-MCNC: 0.83 MG/DL (ref 0.6–1.3)
CREAT SERPL-MCNC: 0.87 MG/DL (ref 0.6–1.3)
CREAT SERPL-MCNC: 0.88 MG/DL (ref 0.6–1.3)
CREAT SERPL-MCNC: 0.88 MG/DL (ref 0.6–1.3)
CREAT SERPL-MCNC: 0.93 MG/DL (ref 0.6–1.3)
CREAT SERPL-MCNC: 0.94 MG/DL (ref 0.6–1.3)
CREAT SERPL-MCNC: 0.97 MG/DL (ref 0.6–1.3)
CREAT SERPL-MCNC: 1.01 MG/DL (ref 0.6–1.3)
CREAT UR-MCNC: 183 MG/DL
CREAT UR-MCNC: 193 MG/DL
CREAT UR-MCNC: 251 MG/DL
CREAT UR-MCNC: 254 MG/DL
CREAT UR-MCNC: 256 MG/DL
CREAT UR-MCNC: 279 MG/DL
EOSINOPHIL # BLD AUTO: 0.05 THOUSAND/ΜL (ref 0–0.61)
EOSINOPHIL # BLD AUTO: 0.06 THOUSAND/ΜL (ref 0–0.61)
EOSINOPHIL # BLD AUTO: 0.07 THOUSAND/ΜL (ref 0–0.61)
EOSINOPHIL # BLD AUTO: 0.07 THOUSAND/ΜL (ref 0–0.61)
EOSINOPHIL # BLD AUTO: 0.08 THOUSAND/ΜL (ref 0–0.61)
EOSINOPHIL # BLD AUTO: 0.08 THOUSAND/ΜL (ref 0–0.61)
EOSINOPHIL # BLD AUTO: 0.09 THOUSAND/ΜL (ref 0–0.61)
EOSINOPHIL # BLD AUTO: 0.09 THOUSAND/ΜL (ref 0–0.61)
EOSINOPHIL # BLD AUTO: 0.14 THOUSAND/ΜL (ref 0–0.61)
EOSINOPHIL NFR BLD AUTO: 1 % (ref 0–6)
EOSINOPHIL NFR BLD AUTO: 2 % (ref 0–6)
ERYTHROCYTE [DISTWIDTH] IN BLOOD BY AUTOMATED COUNT: 13.4 % (ref 11.6–15.1)
ERYTHROCYTE [DISTWIDTH] IN BLOOD BY AUTOMATED COUNT: 13.6 % (ref 11.6–15.1)
ERYTHROCYTE [DISTWIDTH] IN BLOOD BY AUTOMATED COUNT: 13.6 % (ref 11.6–15.1)
ERYTHROCYTE [DISTWIDTH] IN BLOOD BY AUTOMATED COUNT: 14.4 % (ref 11.6–15.1)
ERYTHROCYTE [DISTWIDTH] IN BLOOD BY AUTOMATED COUNT: 14.6 % (ref 11.6–15.1)
ERYTHROCYTE [DISTWIDTH] IN BLOOD BY AUTOMATED COUNT: 15.3 % (ref 11.6–15.1)
ERYTHROCYTE [DISTWIDTH] IN BLOOD BY AUTOMATED COUNT: 15.3 % (ref 11.6–15.1)
ERYTHROCYTE [DISTWIDTH] IN BLOOD BY AUTOMATED COUNT: 16 % (ref 11.6–15.1)
ERYTHROCYTE [DISTWIDTH] IN BLOOD BY AUTOMATED COUNT: 16.4 % (ref 11.6–15.1)
ERYTHROCYTE [DISTWIDTH] IN BLOOD BY AUTOMATED COUNT: 16.6 % (ref 11.6–15.1)
ERYTHROCYTE [DISTWIDTH] IN BLOOD BY AUTOMATED COUNT: 17 % (ref 11.6–15.1)
ERYTHROCYTE [DISTWIDTH] IN BLOOD BY AUTOMATED COUNT: 17.7 % (ref 11.6–15.1)
ERYTHROCYTE [DISTWIDTH] IN BLOOD BY AUTOMATED COUNT: 18.3 % (ref 11.6–15.1)
GFR SERPL CREATININE-BSD FRML MDRD: 53 ML/MIN/1.73SQ M
GFR SERPL CREATININE-BSD FRML MDRD: 55 ML/MIN/1.73SQ M
GFR SERPL CREATININE-BSD FRML MDRD: 57 ML/MIN/1.73SQ M
GFR SERPL CREATININE-BSD FRML MDRD: 58 ML/MIN/1.73SQ M
GFR SERPL CREATININE-BSD FRML MDRD: 62 ML/MIN/1.73SQ M
GFR SERPL CREATININE-BSD FRML MDRD: 62 ML/MIN/1.73SQ M
GFR SERPL CREATININE-BSD FRML MDRD: 63 ML/MIN/1.73SQ M
GFR SERPL CREATININE-BSD FRML MDRD: 67 ML/MIN/1.73SQ M
GFR SERPL CREATININE-BSD FRML MDRD: 69 ML/MIN/1.73SQ M
GFR SERPL CREATININE-BSD FRML MDRD: 71 ML/MIN/1.73SQ M
GFR SERPL CREATININE-BSD FRML MDRD: 73 ML/MIN/1.73SQ M
GFR SERPL CREATININE-BSD FRML MDRD: 73 ML/MIN/1.73SQ M
GFR SERPL CREATININE-BSD FRML MDRD: 79 ML/MIN/1.73SQ M
GLUCOSE P FAST SERPL-MCNC: 102 MG/DL (ref 65–99)
GLUCOSE P FAST SERPL-MCNC: 106 MG/DL (ref 65–99)
GLUCOSE P FAST SERPL-MCNC: 86 MG/DL (ref 65–99)
GLUCOSE P FAST SERPL-MCNC: 93 MG/DL (ref 65–99)
GLUCOSE P FAST SERPL-MCNC: 94 MG/DL (ref 65–99)
GLUCOSE SERPL-MCNC: 100 MG/DL (ref 65–140)
GLUCOSE SERPL-MCNC: 107 MG/DL (ref 65–140)
GLUCOSE SERPL-MCNC: 113 MG/DL (ref 65–140)
GLUCOSE SERPL-MCNC: 114 MG/DL (ref 65–140)
GLUCOSE SERPL-MCNC: 88 MG/DL (ref 65–140)
GLUCOSE SERPL-MCNC: 94 MG/DL (ref 65–140)
GLUCOSE SERPL-MCNC: 97 MG/DL (ref 65–140)
HCT VFR BLD AUTO: 35 % (ref 34.8–46.1)
HCT VFR BLD AUTO: 36.1 % (ref 34.8–46.1)
HCT VFR BLD AUTO: 36.1 % (ref 34.8–46.1)
HCT VFR BLD AUTO: 36.9 % (ref 34.8–46.1)
HCT VFR BLD AUTO: 37 % (ref 34.8–46.1)
HCT VFR BLD AUTO: 37.2 % (ref 34.8–46.1)
HCT VFR BLD AUTO: 37.7 % (ref 34.8–46.1)
HCT VFR BLD AUTO: 37.9 % (ref 34.8–46.1)
HCT VFR BLD AUTO: 38.4 % (ref 34.8–46.1)
HCT VFR BLD AUTO: 38.4 % (ref 34.8–46.1)
HCT VFR BLD AUTO: 38.5 % (ref 34.8–46.1)
HCT VFR BLD AUTO: 39.7 % (ref 34.8–46.1)
HCT VFR BLD AUTO: 40.1 % (ref 34.8–46.1)
HGB BLD-MCNC: 11.3 G/DL (ref 11.5–15.4)
HGB BLD-MCNC: 11.3 G/DL (ref 11.5–15.4)
HGB BLD-MCNC: 11.5 G/DL (ref 11.5–15.4)
HGB BLD-MCNC: 11.6 G/DL (ref 11.5–15.4)
HGB BLD-MCNC: 11.6 G/DL (ref 11.5–15.4)
HGB BLD-MCNC: 11.9 G/DL (ref 11.5–15.4)
HGB BLD-MCNC: 11.9 G/DL (ref 11.5–15.4)
HGB BLD-MCNC: 12 G/DL (ref 11.5–15.4)
HGB BLD-MCNC: 12.1 G/DL (ref 11.5–15.4)
HGB BLD-MCNC: 12.3 G/DL (ref 11.5–15.4)
HGB BLD-MCNC: 12.4 G/DL (ref 11.5–15.4)
IMM GRANULOCYTES # BLD AUTO: 0.01 THOUSAND/UL (ref 0–0.2)
IMM GRANULOCYTES # BLD AUTO: 0.02 THOUSAND/UL (ref 0–0.2)
IMM GRANULOCYTES # BLD AUTO: 0.02 THOUSAND/UL (ref 0–0.2)
IMM GRANULOCYTES # BLD AUTO: 0.04 THOUSAND/UL (ref 0–0.2)
IMM GRANULOCYTES # BLD AUTO: 0.04 THOUSAND/UL (ref 0–0.2)
IMM GRANULOCYTES # BLD AUTO: 0.06 THOUSAND/UL (ref 0–0.2)
IMM GRANULOCYTES NFR BLD AUTO: 0 % (ref 0–2)
IMM GRANULOCYTES NFR BLD AUTO: 1 % (ref 0–2)
LYMPHOCYTES # BLD AUTO: 1.26 THOUSANDS/ΜL (ref 0.6–4.47)
LYMPHOCYTES # BLD AUTO: 1.26 THOUSANDS/ΜL (ref 0.6–4.47)
LYMPHOCYTES # BLD AUTO: 1.27 THOUSANDS/ΜL (ref 0.6–4.47)
LYMPHOCYTES # BLD AUTO: 1.34 THOUSANDS/ΜL (ref 0.6–4.47)
LYMPHOCYTES # BLD AUTO: 1.36 THOUSANDS/ΜL (ref 0.6–4.47)
LYMPHOCYTES # BLD AUTO: 1.41 THOUSANDS/ΜL (ref 0.6–4.47)
LYMPHOCYTES # BLD AUTO: 1.46 THOUSANDS/ΜL (ref 0.6–4.47)
LYMPHOCYTES # BLD AUTO: 1.55 THOUSANDS/ΜL (ref 0.6–4.47)
LYMPHOCYTES # BLD AUTO: 1.57 THOUSANDS/ΜL (ref 0.6–4.47)
LYMPHOCYTES # BLD AUTO: 1.64 THOUSANDS/ΜL (ref 0.6–4.47)
LYMPHOCYTES # BLD AUTO: 1.67 THOUSANDS/ΜL (ref 0.6–4.47)
LYMPHOCYTES # BLD AUTO: 1.72 THOUSANDS/ΜL (ref 0.6–4.47)
LYMPHOCYTES NFR BLD AUTO: 10 % (ref 14–44)
LYMPHOCYTES NFR BLD AUTO: 16 % (ref 14–44)
LYMPHOCYTES NFR BLD AUTO: 18 % (ref 14–44)
LYMPHOCYTES NFR BLD AUTO: 23 % (ref 14–44)
LYMPHOCYTES NFR BLD AUTO: 24 % (ref 14–44)
LYMPHOCYTES NFR BLD AUTO: 26 % (ref 14–44)
LYMPHOCYTES NFR BLD AUTO: 27 % (ref 14–44)
LYMPHOCYTES NFR BLD AUTO: 32 % (ref 14–44)
LYMPHOCYTES NFR BLD AUTO: 36 % (ref 14–44)
LYMPHOCYTES NFR BLD AUTO: 38 % (ref 14–44)
LYMPHOCYTES NFR BLD AUTO: 38 % (ref 14–44)
LYMPHOCYTES NFR BLD AUTO: 45 % (ref 14–44)
MAGNESIUM SERPL-MCNC: 1.7 MG/DL (ref 1.6–2.6)
MAGNESIUM SERPL-MCNC: 1.7 MG/DL (ref 1.6–2.6)
MAGNESIUM SERPL-MCNC: 1.9 MG/DL (ref 1.6–2.6)
MAGNESIUM SERPL-MCNC: 2 MG/DL (ref 1.6–2.6)
MAGNESIUM SERPL-MCNC: 2 MG/DL (ref 1.6–2.6)
MAGNESIUM SERPL-MCNC: 2.1 MG/DL (ref 1.6–2.6)
MAGNESIUM SERPL-MCNC: 2.3 MG/DL (ref 1.6–2.6)
MCH RBC QN AUTO: 27.1 PG (ref 26.8–34.3)
MCH RBC QN AUTO: 27.3 PG (ref 26.8–34.3)
MCH RBC QN AUTO: 27.4 PG (ref 26.8–34.3)
MCH RBC QN AUTO: 27.5 PG (ref 26.8–34.3)
MCH RBC QN AUTO: 27.7 PG (ref 26.8–34.3)
MCH RBC QN AUTO: 27.9 PG (ref 26.8–34.3)
MCH RBC QN AUTO: 28 PG (ref 26.8–34.3)
MCH RBC QN AUTO: 28 PG (ref 26.8–34.3)
MCH RBC QN AUTO: 28.3 PG (ref 26.8–34.3)
MCH RBC QN AUTO: 28.7 PG (ref 26.8–34.3)
MCH RBC QN AUTO: 28.8 PG (ref 26.8–34.3)
MCH RBC QN AUTO: 28.8 PG (ref 26.8–34.3)
MCH RBC QN AUTO: 29.4 PG (ref 26.8–34.3)
MCHC RBC AUTO-ENTMCNC: 30.5 G/DL (ref 31.4–37.4)
MCHC RBC AUTO-ENTMCNC: 30.9 G/DL (ref 31.4–37.4)
MCHC RBC AUTO-ENTMCNC: 31 G/DL (ref 31.4–37.4)
MCHC RBC AUTO-ENTMCNC: 31 G/DL (ref 31.4–37.4)
MCHC RBC AUTO-ENTMCNC: 31.2 G/DL (ref 31.4–37.4)
MCHC RBC AUTO-ENTMCNC: 31.4 G/DL (ref 31.4–37.4)
MCHC RBC AUTO-ENTMCNC: 31.8 G/DL (ref 31.4–37.4)
MCHC RBC AUTO-ENTMCNC: 31.9 G/DL (ref 31.4–37.4)
MCHC RBC AUTO-ENTMCNC: 32.1 G/DL (ref 31.4–37.4)
MCHC RBC AUTO-ENTMCNC: 32.2 G/DL (ref 31.4–37.4)
MCHC RBC AUTO-ENTMCNC: 32.3 G/DL (ref 31.4–37.4)
MCHC RBC AUTO-ENTMCNC: 32.3 G/DL (ref 31.4–37.4)
MCHC RBC AUTO-ENTMCNC: 32.7 G/DL (ref 31.4–37.4)
MCV RBC AUTO: 87 FL (ref 82–98)
MCV RBC AUTO: 88 FL (ref 82–98)
MCV RBC AUTO: 89 FL (ref 82–98)
MCV RBC AUTO: 90 FL (ref 82–98)
MCV RBC AUTO: 91 FL (ref 82–98)
MONOCYTES # BLD AUTO: 0.29 THOUSAND/ΜL (ref 0.17–1.22)
MONOCYTES # BLD AUTO: 0.37 THOUSAND/ΜL (ref 0.17–1.22)
MONOCYTES # BLD AUTO: 0.44 THOUSAND/ΜL (ref 0.17–1.22)
MONOCYTES # BLD AUTO: 0.59 THOUSAND/ΜL (ref 0.17–1.22)
MONOCYTES # BLD AUTO: 0.6 THOUSAND/ΜL (ref 0.17–1.22)
MONOCYTES # BLD AUTO: 0.61 THOUSAND/ΜL (ref 0.17–1.22)
MONOCYTES # BLD AUTO: 0.61 THOUSAND/ΜL (ref 0.17–1.22)
MONOCYTES # BLD AUTO: 0.68 THOUSAND/ΜL (ref 0.17–1.22)
MONOCYTES # BLD AUTO: 0.77 THOUSAND/ΜL (ref 0.17–1.22)
MONOCYTES # BLD AUTO: 1.08 THOUSAND/ΜL (ref 0.17–1.22)
MONOCYTES # BLD AUTO: 1.09 THOUSAND/ΜL (ref 0.17–1.22)
MONOCYTES # BLD AUTO: 1.17 THOUSAND/ΜL (ref 0.17–1.22)
MONOCYTES NFR BLD AUTO: 10 % (ref 4–12)
MONOCYTES NFR BLD AUTO: 12 % (ref 4–12)
MONOCYTES NFR BLD AUTO: 12 % (ref 4–12)
MONOCYTES NFR BLD AUTO: 14 % (ref 4–12)
MONOCYTES NFR BLD AUTO: 14 % (ref 4–12)
MONOCYTES NFR BLD AUTO: 15 % (ref 4–12)
MONOCYTES NFR BLD AUTO: 17 % (ref 4–12)
MONOCYTES NFR BLD AUTO: 18 % (ref 4–12)
MONOCYTES NFR BLD AUTO: 6 % (ref 4–12)
MONOCYTES NFR BLD AUTO: 8 % (ref 4–12)
MONOCYTES NFR BLD AUTO: 9 % (ref 4–12)
MONOCYTES NFR BLD AUTO: 9 % (ref 4–12)
NEUTROPHILS # BLD AUTO: 1.16 THOUSANDS/ΜL (ref 1.85–7.62)
NEUTROPHILS # BLD AUTO: 1.85 THOUSANDS/ΜL (ref 1.85–7.62)
NEUTROPHILS # BLD AUTO: 1.91 THOUSANDS/ΜL (ref 1.85–7.62)
NEUTROPHILS # BLD AUTO: 10.11 THOUSANDS/ΜL (ref 1.85–7.62)
NEUTROPHILS # BLD AUTO: 2.28 THOUSANDS/ΜL (ref 1.85–7.62)
NEUTROPHILS # BLD AUTO: 2.77 THOUSANDS/ΜL (ref 1.85–7.62)
NEUTROPHILS # BLD AUTO: 2.97 THOUSANDS/ΜL (ref 1.85–7.62)
NEUTROPHILS # BLD AUTO: 3.21 THOUSANDS/ΜL (ref 1.85–7.62)
NEUTROPHILS # BLD AUTO: 3.59 THOUSANDS/ΜL (ref 1.85–7.62)
NEUTROPHILS # BLD AUTO: 4.05 THOUSANDS/ΜL (ref 1.85–7.62)
NEUTROPHILS # BLD AUTO: 5.3 THOUSANDS/ΜL (ref 1.85–7.62)
NEUTROPHILS # BLD AUTO: 6.03 THOUSANDS/ΜL (ref 1.85–7.62)
NEUTS SEG NFR BLD AUTO: 38 % (ref 43–75)
NEUTS SEG NFR BLD AUTO: 42 % (ref 43–75)
NEUTS SEG NFR BLD AUTO: 45 % (ref 43–75)
NEUTS SEG NFR BLD AUTO: 53 % (ref 43–75)
NEUTS SEG NFR BLD AUTO: 54 % (ref 43–75)
NEUTS SEG NFR BLD AUTO: 55 % (ref 43–75)
NEUTS SEG NFR BLD AUTO: 62 % (ref 43–75)
NEUTS SEG NFR BLD AUTO: 64 % (ref 43–75)
NEUTS SEG NFR BLD AUTO: 64 % (ref 43–75)
NEUTS SEG NFR BLD AUTO: 65 % (ref 43–75)
NEUTS SEG NFR BLD AUTO: 73 % (ref 43–75)
NEUTS SEG NFR BLD AUTO: 79 % (ref 43–75)
NRBC BLD AUTO-RTO: 0 /100 WBCS
PLATELET # BLD AUTO: 197 THOUSANDS/UL (ref 149–390)
PLATELET # BLD AUTO: 203 THOUSANDS/UL (ref 149–390)
PLATELET # BLD AUTO: 208 THOUSANDS/UL (ref 149–390)
PLATELET # BLD AUTO: 214 THOUSANDS/UL (ref 149–390)
PLATELET # BLD AUTO: 217 THOUSANDS/UL (ref 149–390)
PLATELET # BLD AUTO: 218 THOUSANDS/UL (ref 149–390)
PLATELET # BLD AUTO: 219 THOUSANDS/UL (ref 149–390)
PLATELET # BLD AUTO: 221 THOUSANDS/UL (ref 149–390)
PLATELET # BLD AUTO: 237 THOUSANDS/UL (ref 149–390)
PLATELET # BLD AUTO: 245 THOUSANDS/UL (ref 149–390)
PLATELET # BLD AUTO: 246 THOUSANDS/UL (ref 149–390)
PLATELET # BLD AUTO: 249 THOUSANDS/UL (ref 149–390)
PLATELET # BLD AUTO: 250 THOUSANDS/UL (ref 149–390)
PMV BLD AUTO: 8.6 FL (ref 8.9–12.7)
PMV BLD AUTO: 8.8 FL (ref 8.9–12.7)
PMV BLD AUTO: 9 FL (ref 8.9–12.7)
PMV BLD AUTO: 9.2 FL (ref 8.9–12.7)
PMV BLD AUTO: 9.3 FL (ref 8.9–12.7)
PMV BLD AUTO: 9.4 FL (ref 8.9–12.7)
PMV BLD AUTO: 9.5 FL (ref 8.9–12.7)
POTASSIUM SERPL-SCNC: 3.7 MMOL/L (ref 3.5–5.3)
POTASSIUM SERPL-SCNC: 3.8 MMOL/L (ref 3.5–5.3)
POTASSIUM SERPL-SCNC: 3.9 MMOL/L (ref 3.5–5.3)
POTASSIUM SERPL-SCNC: 4 MMOL/L (ref 3.5–5.3)
POTASSIUM SERPL-SCNC: 4.1 MMOL/L (ref 3.5–5.3)
POTASSIUM SERPL-SCNC: 4.3 MMOL/L (ref 3.5–5.3)
POTASSIUM SERPL-SCNC: 4.3 MMOL/L (ref 3.5–5.3)
PROT SERPL-MCNC: 6.9 G/DL (ref 6.4–8.2)
PROT SERPL-MCNC: 7.2 G/DL (ref 6.4–8.2)
PROT SERPL-MCNC: 7.3 G/DL (ref 6.4–8.2)
PROT SERPL-MCNC: 7.3 G/DL (ref 6.4–8.2)
PROT SERPL-MCNC: 7.4 G/DL (ref 6.4–8.2)
PROT SERPL-MCNC: 7.4 G/DL (ref 6.4–8.2)
PROT SERPL-MCNC: 7.5 G/DL (ref 6.4–8.2)
PROT SERPL-MCNC: 7.7 G/DL (ref 6.4–8.2)
PROT UR-MCNC: 13 MG/DL
PROT UR-MCNC: 14 MG/DL
PROT UR-MCNC: 16 MG/DL
PROT UR-MCNC: 17 MG/DL
PROT UR-MCNC: 6 MG/DL
PROT UR-MCNC: 9 MG/DL
PROT/CREAT UR: 0.03 MG/G{CREAT} (ref 0–0.1)
PROT/CREAT UR: 0.05 MG/G{CREAT} (ref 0–0.1)
PROT/CREAT UR: 0.06 MG/G{CREAT} (ref 0–0.1)
PROT/CREAT UR: 0.07 MG/G{CREAT} (ref 0–0.1)
RBC # BLD AUTO: 3.92 MILLION/UL (ref 3.81–5.12)
RBC # BLD AUTO: 4.04 MILLION/UL (ref 3.81–5.12)
RBC # BLD AUTO: 4.05 MILLION/UL (ref 3.81–5.12)
RBC # BLD AUTO: 4.11 MILLION/UL (ref 3.81–5.12)
RBC # BLD AUTO: 4.14 MILLION/UL (ref 3.81–5.12)
RBC # BLD AUTO: 4.22 MILLION/UL (ref 3.81–5.12)
RBC # BLD AUTO: 4.27 MILLION/UL (ref 3.81–5.12)
RBC # BLD AUTO: 4.29 MILLION/UL (ref 3.81–5.12)
RBC # BLD AUTO: 4.31 MILLION/UL (ref 3.81–5.12)
RBC # BLD AUTO: 4.34 MILLION/UL (ref 3.81–5.12)
RBC # BLD AUTO: 4.44 MILLION/UL (ref 3.81–5.12)
RBC # BLD AUTO: 4.44 MILLION/UL (ref 3.81–5.12)
RBC # BLD AUTO: 4.57 MILLION/UL (ref 3.81–5.12)
SODIUM SERPL-SCNC: 138 MMOL/L (ref 136–145)
SODIUM SERPL-SCNC: 139 MMOL/L (ref 136–145)
SODIUM SERPL-SCNC: 140 MMOL/L (ref 136–145)
SODIUM SERPL-SCNC: 140 MMOL/L (ref 136–145)
SODIUM SERPL-SCNC: 141 MMOL/L (ref 136–145)
SODIUM SERPL-SCNC: 142 MMOL/L (ref 136–145)
WBC # BLD AUTO: 12.71 THOUSAND/UL (ref 4.31–10.16)
WBC # BLD AUTO: 3.05 THOUSAND/UL (ref 4.31–10.16)
WBC # BLD AUTO: 4.18 THOUSAND/UL (ref 4.31–10.16)
WBC # BLD AUTO: 4.29 THOUSAND/UL (ref 4.31–10.16)
WBC # BLD AUTO: 4.41 THOUSAND/UL (ref 4.31–10.16)
WBC # BLD AUTO: 4.65 THOUSAND/UL (ref 4.31–10.16)
WBC # BLD AUTO: 5.1 THOUSAND/UL (ref 4.31–10.16)
WBC # BLD AUTO: 5.19 THOUSAND/UL (ref 4.31–10.16)
WBC # BLD AUTO: 6.23 THOUSAND/UL (ref 4.31–10.16)
WBC # BLD AUTO: 6.55 THOUSAND/UL (ref 4.31–10.16)
WBC # BLD AUTO: 7.22 THOUSAND/UL (ref 4.31–10.16)
WBC # BLD AUTO: 8.01 THOUSAND/UL (ref 4.31–10.16)
WBC # BLD AUTO: 8.22 THOUSAND/UL (ref 4.31–10.16)

## 2019-01-01 PROCEDURE — 80053 COMPREHEN METABOLIC PANEL: CPT

## 2019-01-01 PROCEDURE — 96367 TX/PROPH/DG ADDL SEQ IV INF: CPT

## 2019-01-01 PROCEDURE — C1788 PORT, INDWELLING, IMP: HCPCS

## 2019-01-01 PROCEDURE — 36415 COLL VENOUS BLD VENIPUNCTURE: CPT

## 2019-01-01 PROCEDURE — 99214 OFFICE O/P EST MOD 30 MIN: CPT | Performed by: PHYSICIAN ASSISTANT

## 2019-01-01 PROCEDURE — 85025 COMPLETE CBC W/AUTO DIFF WBC: CPT

## 2019-01-01 PROCEDURE — 82570 ASSAY OF URINE CREATININE: CPT

## 2019-01-01 PROCEDURE — 83735 ASSAY OF MAGNESIUM: CPT

## 2019-01-01 PROCEDURE — 93970 EXTREMITY STUDY: CPT | Performed by: SURGERY

## 2019-01-01 PROCEDURE — 96375 TX/PRO/DX INJ NEW DRUG ADDON: CPT

## 2019-01-01 PROCEDURE — 85027 COMPLETE CBC AUTOMATED: CPT | Performed by: RADIOLOGY

## 2019-01-01 PROCEDURE — 88305 TISSUE EXAM BY PATHOLOGIST: CPT | Performed by: PATHOLOGY

## 2019-01-01 PROCEDURE — 77001 FLUOROGUIDE FOR VEIN DEVICE: CPT | Performed by: RADIOLOGY

## 2019-01-01 PROCEDURE — 1123F ACP DISCUSS/DSCN MKR DOCD: CPT | Performed by: PHYSICIAN ASSISTANT

## 2019-01-01 PROCEDURE — 76937 US GUIDE VASCULAR ACCESS: CPT | Performed by: RADIOLOGY

## 2019-01-01 PROCEDURE — 74177 CT ABD & PELVIS W/CONTRAST: CPT

## 2019-01-01 PROCEDURE — 84520 ASSAY OF UREA NITROGEN: CPT | Performed by: NURSE PRACTITIONER

## 2019-01-01 PROCEDURE — 77063 BREAST TOMOSYNTHESIS BI: CPT

## 2019-01-01 PROCEDURE — 96417 CHEMO IV INFUS EACH ADDL SEQ: CPT

## 2019-01-01 PROCEDURE — 86304 IMMUNOASSAY TUMOR CA 125: CPT

## 2019-01-01 PROCEDURE — 84156 ASSAY OF PROTEIN URINE: CPT

## 2019-01-01 PROCEDURE — 82565 ASSAY OF CREATININE: CPT | Performed by: NURSE PRACTITIONER

## 2019-01-01 PROCEDURE — 96413 CHEMO IV INFUSION 1 HR: CPT

## 2019-01-01 PROCEDURE — 88342 IMHCHEM/IMCYTCHM 1ST ANTB: CPT | Performed by: PATHOLOGY

## 2019-01-01 PROCEDURE — 19083 BX BREAST 1ST LESION US IMAG: CPT

## 2019-01-01 PROCEDURE — 76937 US GUIDE VASCULAR ACCESS: CPT

## 2019-01-01 PROCEDURE — 99212 OFFICE O/P EST SF 10 MIN: CPT | Performed by: PHYSICIAN ASSISTANT

## 2019-01-01 PROCEDURE — 36561 INSERT TUNNELED CV CATH: CPT

## 2019-01-01 PROCEDURE — 99152 MOD SED SAME PHYS/QHP 5/>YRS: CPT | Performed by: RADIOLOGY

## 2019-01-01 PROCEDURE — 93970 EXTREMITY STUDY: CPT

## 2019-01-01 PROCEDURE — 99153 MOD SED SAME PHYS/QHP EA: CPT

## 2019-01-01 PROCEDURE — 77001 FLUOROGUIDE FOR VEIN DEVICE: CPT

## 2019-01-01 PROCEDURE — 76642 ULTRASOUND BREAST LIMITED: CPT

## 2019-01-01 PROCEDURE — 77067 SCR MAMMO BI INCL CAD: CPT

## 2019-01-01 PROCEDURE — 99213 OFFICE O/P EST LOW 20 MIN: CPT | Performed by: PHYSICIAN ASSISTANT

## 2019-01-01 PROCEDURE — 71260 CT THORAX DX C+: CPT

## 2019-01-01 PROCEDURE — 88341 IMHCHEM/IMCYTCHM EA ADD ANTB: CPT | Performed by: PATHOLOGY

## 2019-01-01 PROCEDURE — 99214 OFFICE O/P EST MOD 30 MIN: CPT | Performed by: OBSTETRICS & GYNECOLOGY

## 2019-01-01 PROCEDURE — 86304 IMMUNOASSAY TUMOR CA 125: CPT | Performed by: OBSTETRICS & GYNECOLOGY

## 2019-01-01 PROCEDURE — 99152 MOD SED SAME PHYS/QHP 5/>YRS: CPT

## 2019-01-01 PROCEDURE — 36561 INSERT TUNNELED CV CATH: CPT | Performed by: RADIOLOGY

## 2019-01-01 RX ORDER — LIDOCAINE HYDROCHLORIDE 10 MG/ML
INJECTION, SOLUTION EPIDURAL; INFILTRATION; INTRACAUDAL; PERINEURAL CODE/TRAUMA/SEDATION MEDICATION
Status: COMPLETED | OUTPATIENT
Start: 2019-01-01 | End: 2019-01-01

## 2019-01-01 RX ORDER — FENTANYL CITRATE 50 UG/ML
INJECTION, SOLUTION INTRAMUSCULAR; INTRAVENOUS CODE/TRAUMA/SEDATION MEDICATION
Status: COMPLETED | OUTPATIENT
Start: 2019-01-01 | End: 2019-01-01

## 2019-01-01 RX ORDER — LOSARTAN POTASSIUM 50 MG/1
50 TABLET ORAL DAILY
Refills: 3 | COMMUNITY
Start: 2019-01-01 | End: 2020-01-01

## 2019-01-01 RX ORDER — SODIUM CHLORIDE 9 MG/ML
20 INJECTION, SOLUTION INTRAVENOUS ONCE
Status: CANCELLED | OUTPATIENT
Start: 2019-01-01

## 2019-01-01 RX ORDER — DEXTROSE MONOHYDRATE 50 MG/ML
20 INJECTION, SOLUTION INTRAVENOUS ONCE
Status: CANCELLED | OUTPATIENT
Start: 2019-01-01

## 2019-01-01 RX ORDER — SODIUM CHLORIDE 9 MG/ML
20 INJECTION, SOLUTION INTRAVENOUS ONCE
Status: COMPLETED | OUTPATIENT
Start: 2019-01-01 | End: 2019-01-01

## 2019-01-01 RX ORDER — DEXTROSE MONOHYDRATE 50 MG/ML
20 INJECTION, SOLUTION INTRAVENOUS ONCE
Status: COMPLETED | OUTPATIENT
Start: 2019-01-01 | End: 2019-01-01

## 2019-01-01 RX ORDER — ONDANSETRON HYDROCHLORIDE 8 MG/1
8 TABLET, FILM COATED ORAL EVERY 8 HOURS PRN
Qty: 20 TABLET | Refills: 1 | Status: ON HOLD | OUTPATIENT
Start: 2019-01-01 | End: 2020-01-01 | Stop reason: CLARIF

## 2019-01-01 RX ORDER — PALONOSETRON 0.05 MG/ML
0.25 INJECTION, SOLUTION INTRAVENOUS ONCE
Status: CANCELLED | OUTPATIENT
Start: 2019-01-01

## 2019-01-01 RX ORDER — HYDROCHLOROTHIAZIDE 12.5 MG/1
12.5 TABLET ORAL DAILY PRN
Refills: 3 | COMMUNITY
Start: 2019-01-01 | End: 2020-04-09 | Stop reason: HOSPADM

## 2019-01-01 RX ORDER — SODIUM CHLORIDE 9 MG/ML
50 INJECTION, SOLUTION INTRAVENOUS CONTINUOUS
Status: DISCONTINUED | OUTPATIENT
Start: 2019-01-01 | End: 2019-01-01 | Stop reason: HOSPADM

## 2019-01-01 RX ORDER — LIDOCAINE HYDROCHLORIDE AND EPINEPHRINE 10; 10 MG/ML; UG/ML
INJECTION, SOLUTION INFILTRATION; PERINEURAL CODE/TRAUMA/SEDATION MEDICATION
Status: COMPLETED | OUTPATIENT
Start: 2019-01-01 | End: 2019-01-01

## 2019-01-01 RX ORDER — PALONOSETRON 0.05 MG/ML
0.25 INJECTION, SOLUTION INTRAVENOUS ONCE
Status: COMPLETED | OUTPATIENT
Start: 2019-01-01 | End: 2019-01-01

## 2019-01-01 RX ORDER — MIDAZOLAM HYDROCHLORIDE 1 MG/ML
INJECTION INTRAMUSCULAR; INTRAVENOUS CODE/TRAUMA/SEDATION MEDICATION
Status: COMPLETED | OUTPATIENT
Start: 2019-01-01 | End: 2019-01-01

## 2019-01-01 RX ORDER — SODIUM CHLORIDE 9 MG/ML
50 INJECTION, SOLUTION INTRAVENOUS CONTINUOUS
Status: CANCELLED | OUTPATIENT
Start: 2019-01-01

## 2019-01-01 RX ORDER — LORAZEPAM 1 MG/1
1 TABLET ORAL EVERY 8 HOURS PRN
Qty: 20 TABLET | Refills: 1 | Status: ON HOLD | OUTPATIENT
Start: 2019-01-01 | End: 2020-01-01 | Stop reason: CLARIF

## 2019-01-01 RX ADMIN — PALONOSETRON 0.25 MG: 0.05 INJECTION, SOLUTION INTRAVENOUS at 10:29

## 2019-01-01 RX ADMIN — SODIUM CHLORIDE 20 ML/HR: 9 INJECTION, SOLUTION INTRAVENOUS at 10:00

## 2019-01-01 RX ADMIN — PALONOSETRON 0.25 MG: 0.05 INJECTION, SOLUTION INTRAVENOUS at 09:37

## 2019-01-01 RX ADMIN — LIDOCAINE HYDROCHLORIDE AND EPINEPHRINE 5 ML: 10; 10 INJECTION, SOLUTION INFILTRATION; PERINEURAL at 11:48

## 2019-01-01 RX ADMIN — SODIUM CHLORIDE 150 MG: 0.9 INJECTION, SOLUTION INTRAVENOUS at 10:01

## 2019-01-01 RX ADMIN — IOHEXOL 100 ML: 350 INJECTION, SOLUTION INTRAVENOUS at 10:26

## 2019-01-01 RX ADMIN — SODIUM CHLORIDE 20 ML/HR: 0.9 INJECTION, SOLUTION INTRAVENOUS at 09:15

## 2019-01-01 RX ADMIN — DEXAMETHASONE SODIUM PHOSPHATE 20 MG: 10 INJECTION, SOLUTION INTRAMUSCULAR; INTRAVENOUS at 09:41

## 2019-01-01 RX ADMIN — SODIUM CHLORIDE 50 ML/HR: 0.9 INJECTION, SOLUTION INTRAVENOUS at 09:49

## 2019-01-01 RX ADMIN — BEVACIZUMAB 800 MG: 400 INJECTION, SOLUTION INTRAVENOUS at 10:15

## 2019-01-01 RX ADMIN — MIDAZOLAM HYDROCHLORIDE 1 MG: 1 INJECTION, SOLUTION INTRAMUSCULAR; INTRAVENOUS at 11:45

## 2019-01-01 RX ADMIN — SODIUM CHLORIDE 150 MG: 0.9 INJECTION, SOLUTION INTRAVENOUS at 10:12

## 2019-01-01 RX ADMIN — FENTANYL CITRATE 50 MCG: 50 INJECTION, SOLUTION INTRAMUSCULAR; INTRAVENOUS at 11:57

## 2019-01-01 RX ADMIN — CARBOPLATIN 388 MG: 10 INJECTION, SOLUTION INTRAVENOUS at 13:29

## 2019-01-01 RX ADMIN — SODIUM CHLORIDE 20 ML/HR: 0.9 INJECTION, SOLUTION INTRAVENOUS at 09:32

## 2019-01-01 RX ADMIN — DOXORUBICIN HYDROCHLORIDE 53.4 MG: 2 INJECTABLE, LIPOSOMAL INTRAVENOUS at 10:46

## 2019-01-01 RX ADMIN — SODIUM CHLORIDE 20 ML/HR: 0.9 INJECTION, SOLUTION INTRAVENOUS at 09:36

## 2019-01-01 RX ADMIN — FENTANYL CITRATE 50 MCG: 50 INJECTION, SOLUTION INTRAMUSCULAR; INTRAVENOUS at 11:45

## 2019-01-01 RX ADMIN — DOXORUBICIN HYDROCHLORIDE 53.1 MG: 2 INJECTABLE, LIPOSOMAL INTRAVENOUS at 12:21

## 2019-01-01 RX ADMIN — LIDOCAINE HYDROCHLORIDE AND EPINEPHRINE 10 ML: 10; 10 INJECTION, SOLUTION INFILTRATION; PERINEURAL at 12:02

## 2019-01-01 RX ADMIN — SODIUM CHLORIDE 20 ML/HR: 9 INJECTION, SOLUTION INTRAVENOUS at 09:40

## 2019-01-01 RX ADMIN — BEVACIZUMAB 800 MG: 400 INJECTION, SOLUTION INTRAVENOUS at 10:33

## 2019-01-01 RX ADMIN — SODIUM CHLORIDE 150 MG: 0.9 INJECTION, SOLUTION INTRAVENOUS at 09:56

## 2019-01-01 RX ADMIN — DEXTROSE 20 ML/HR: 50 INJECTION, SOLUTION INTRAVENOUS at 10:47

## 2019-01-01 RX ADMIN — LIDOCAINE HYDROCHLORIDE AND EPINEPHRINE 5 ML: 10; 10 INJECTION, SOLUTION INFILTRATION; PERINEURAL at 11:54

## 2019-01-01 RX ADMIN — DOXORUBICIN HYDROCHLORIDE 53.4 MG: 2 INJECTABLE, LIPOSOMAL INTRAVENOUS at 10:51

## 2019-01-01 RX ADMIN — DEXAMETHASONE SODIUM PHOSPHATE 20 MG: 10 INJECTION, SOLUTION INTRAMUSCULAR; INTRAVENOUS at 09:37

## 2019-01-01 RX ADMIN — LIDOCAINE HYDROCHLORIDE 5 ML: 10 INJECTION, SOLUTION EPIDURAL; INFILTRATION; INTRACAUDAL; PERINEURAL at 11:29

## 2019-01-01 RX ADMIN — CARBOPLATIN 353.5 MG: 10 INJECTION, SOLUTION INTRAVENOUS at 11:59

## 2019-01-01 RX ADMIN — MIDAZOLAM HYDROCHLORIDE 1 MG: 1 INJECTION, SOLUTION INTRAMUSCULAR; INTRAVENOUS at 11:57

## 2019-01-01 RX ADMIN — PALONOSETRON 0.25 MG: 0.05 INJECTION, SOLUTION INTRAVENOUS at 10:43

## 2019-01-01 RX ADMIN — CARBOPLATIN 415 MG: 10 INJECTION, SOLUTION INTRAVENOUS at 11:59

## 2019-01-01 RX ADMIN — IOHEXOL 100 ML: 350 INJECTION, SOLUTION INTRAVENOUS at 14:06

## 2019-01-01 RX ADMIN — BEVACIZUMAB 800 MG: 400 INJECTION, SOLUTION INTRAVENOUS at 13:08

## 2019-01-01 RX ADMIN — DEXTROSE 20 ML/HR: 50 INJECTION, SOLUTION INTRAVENOUS at 10:46

## 2019-01-01 RX ADMIN — BEVACIZUMAB 800 MG: 400 INJECTION, SOLUTION INTRAVENOUS at 10:11

## 2019-01-01 RX ADMIN — DEXTROSE 20 ML/HR: 50 INJECTION, SOLUTION INTRAVENOUS at 12:21

## 2019-01-01 RX ADMIN — DEXAMETHASONE SODIUM PHOSPHATE 20 MG: 10 INJECTION, SOLUTION INTRAMUSCULAR; INTRAVENOUS at 09:32

## 2019-01-02 ENCOUNTER — TRANSCRIBE ORDERS (OUTPATIENT)
Dept: ADMINISTRATIVE | Facility: HOSPITAL | Age: 79
End: 2019-01-02

## 2019-01-02 ENCOUNTER — APPOINTMENT (OUTPATIENT)
Dept: LAB | Facility: HOSPITAL | Age: 79
End: 2019-01-02
Payer: MEDICARE

## 2019-01-02 DIAGNOSIS — I82.4Z2 DVT, LOWER EXTREMITY, DISTAL, ACUTE, LEFT (HCC): ICD-10-CM

## 2019-01-02 DIAGNOSIS — C56.9 MALIGNANT NEOPLASM OF OVARY, UNSPECIFIED LATERALITY (HCC): ICD-10-CM

## 2019-01-02 DIAGNOSIS — C56.9 MALIGNANT NEOPLASM OF OVARY, UNSPECIFIED LATERALITY (HCC): Primary | ICD-10-CM

## 2019-01-02 LAB
ALBUMIN SERPL BCP-MCNC: 2.9 G/DL (ref 3.5–5)
ALP SERPL-CCNC: 148 U/L (ref 46–116)
ALT SERPL W P-5'-P-CCNC: 13 U/L (ref 12–78)
ANION GAP SERPL CALCULATED.3IONS-SCNC: 12 MMOL/L (ref 4–13)
AST SERPL W P-5'-P-CCNC: 12 U/L (ref 5–45)
BASOPHILS # BLD AUTO: 0.03 THOUSANDS/ΜL (ref 0–0.1)
BASOPHILS NFR BLD AUTO: 0 % (ref 0–1)
BILIRUB SERPL-MCNC: 0.5 MG/DL (ref 0.2–1)
BUN SERPL-MCNC: 16 MG/DL (ref 5–25)
CALCIUM SERPL-MCNC: 8.9 MG/DL (ref 8.3–10.1)
CANCER AG125 SERPL-ACNC: 12.3 U/ML (ref 0–30)
CHLORIDE SERPL-SCNC: 98 MMOL/L (ref 100–108)
CO2 SERPL-SCNC: 27 MMOL/L (ref 21–32)
CREAT SERPL-MCNC: 0.75 MG/DL (ref 0.6–1.3)
EOSINOPHIL # BLD AUTO: 0.05 THOUSAND/ΜL (ref 0–0.61)
EOSINOPHIL NFR BLD AUTO: 1 % (ref 0–6)
ERYTHROCYTE [DISTWIDTH] IN BLOOD BY AUTOMATED COUNT: 14.1 % (ref 11.6–15.1)
GFR SERPL CREATININE-BSD FRML MDRD: 76 ML/MIN/1.73SQ M
GLUCOSE SERPL-MCNC: 98 MG/DL (ref 65–140)
HCT VFR BLD AUTO: 33.2 % (ref 34.8–46.1)
HGB BLD-MCNC: 10.2 G/DL (ref 11.5–15.4)
IMM GRANULOCYTES # BLD AUTO: 0.08 THOUSAND/UL (ref 0–0.2)
IMM GRANULOCYTES NFR BLD AUTO: 1 % (ref 0–2)
LYMPHOCYTES # BLD AUTO: 1.97 THOUSANDS/ΜL (ref 0.6–4.47)
LYMPHOCYTES NFR BLD AUTO: 21 % (ref 14–44)
MAGNESIUM SERPL-MCNC: 2 MG/DL (ref 1.6–2.6)
MCH RBC QN AUTO: 27.6 PG (ref 26.8–34.3)
MCHC RBC AUTO-ENTMCNC: 30.7 G/DL (ref 31.4–37.4)
MCV RBC AUTO: 90 FL (ref 82–98)
MONOCYTES # BLD AUTO: 1.62 THOUSAND/ΜL (ref 0.17–1.22)
MONOCYTES NFR BLD AUTO: 17 % (ref 4–12)
NEUTROPHILS # BLD AUTO: 5.73 THOUSANDS/ΜL (ref 1.85–7.62)
NEUTS SEG NFR BLD AUTO: 60 % (ref 43–75)
NRBC BLD AUTO-RTO: 0 /100 WBCS
PLATELET # BLD AUTO: 310 THOUSANDS/UL (ref 149–390)
PMV BLD AUTO: 9.1 FL (ref 8.9–12.7)
POTASSIUM SERPL-SCNC: 3.6 MMOL/L (ref 3.5–5.3)
PROT SERPL-MCNC: 7.4 G/DL (ref 6.4–8.2)
RBC # BLD AUTO: 3.69 MILLION/UL (ref 3.81–5.12)
SODIUM SERPL-SCNC: 137 MMOL/L (ref 136–145)
WBC # BLD AUTO: 9.48 THOUSAND/UL (ref 4.31–10.16)

## 2019-01-02 PROCEDURE — 85025 COMPLETE CBC W/AUTO DIFF WBC: CPT

## 2019-01-02 PROCEDURE — 36415 COLL VENOUS BLD VENIPUNCTURE: CPT

## 2019-01-02 PROCEDURE — 86304 IMMUNOASSAY TUMOR CA 125: CPT

## 2019-01-02 PROCEDURE — 80053 COMPREHEN METABOLIC PANEL: CPT

## 2019-01-02 PROCEDURE — 83735 ASSAY OF MAGNESIUM: CPT

## 2019-01-02 RX ORDER — RIVAROXABAN 20 MG/1
TABLET, FILM COATED ORAL
Qty: 30 TABLET | Refills: 3 | Status: SHIPPED | OUTPATIENT
Start: 2019-01-02 | End: 2019-01-01 | Stop reason: SDUPTHER

## 2019-01-03 ENCOUNTER — OFFICE VISIT (OUTPATIENT)
Dept: GYNECOLOGIC ONCOLOGY | Facility: CLINIC | Age: 79
End: 2019-01-03
Payer: MEDICARE

## 2019-01-03 VITALS
DIASTOLIC BLOOD PRESSURE: 72 MMHG | HEART RATE: 108 BPM | SYSTOLIC BLOOD PRESSURE: 122 MMHG | TEMPERATURE: 97.5 F | BODY MASS INDEX: 29.35 KG/M2 | HEIGHT: 62 IN | WEIGHT: 159.5 LBS

## 2019-01-03 DIAGNOSIS — Z98.890 POSTOPERATIVE STATE: ICD-10-CM

## 2019-01-03 DIAGNOSIS — C56.3 MALIGNANT NEOPLASM OF BOTH OVARIES (HCC): Primary | ICD-10-CM

## 2019-01-03 PROCEDURE — 99213 OFFICE O/P EST LOW 20 MIN: CPT | Performed by: PHYSICIAN ASSISTANT

## 2019-01-03 RX ORDER — PALONOSETRON 0.05 MG/ML
0.25 INJECTION, SOLUTION INTRAVENOUS ONCE
Status: COMPLETED | OUTPATIENT
Start: 2019-01-04 | End: 2019-01-04

## 2019-01-03 RX ORDER — SODIUM CHLORIDE 9 MG/ML
20 INJECTION, SOLUTION INTRAVENOUS ONCE
Status: COMPLETED | OUTPATIENT
Start: 2019-01-04 | End: 2019-01-04

## 2019-01-04 ENCOUNTER — HOSPITAL ENCOUNTER (OUTPATIENT)
Dept: INFUSION CENTER | Facility: HOSPITAL | Age: 79
Discharge: HOME | End: 2019-01-04
Payer: MEDICARE

## 2019-01-04 VITALS
OXYGEN SATURATION: 99 % | SYSTOLIC BLOOD PRESSURE: 124 MMHG | TEMPERATURE: 98.5 F | WEIGHT: 159.39 LBS | BODY MASS INDEX: 29.33 KG/M2 | DIASTOLIC BLOOD PRESSURE: 60 MMHG | HEIGHT: 62 IN | HEART RATE: 104 BPM | RESPIRATION RATE: 18 BRPM

## 2019-01-04 PROBLEM — Z98.890 POSTOPERATIVE STATE: Status: ACTIVE | Noted: 2019-01-04

## 2019-01-04 LAB
BASOPHILS # BLD AUTO: 0.03 THOUSANDS/ΜL (ref 0–0.1)
BASOPHILS NFR BLD AUTO: 0 % (ref 0–1)
EOSINOPHIL # BLD AUTO: 0.07 THOUSAND/ΜL (ref 0–0.61)
EOSINOPHIL NFR BLD AUTO: 1 % (ref 0–6)
ERYTHROCYTE [DISTWIDTH] IN BLOOD BY AUTOMATED COUNT: 13.8 % (ref 11.6–15.1)
HCT VFR BLD AUTO: 31.5 % (ref 34.8–46.1)
HGB BLD-MCNC: 9.7 G/DL (ref 11.5–15.4)
IMM GRANULOCYTES # BLD AUTO: 0.04 THOUSAND/UL (ref 0–0.2)
IMM GRANULOCYTES NFR BLD AUTO: 1 % (ref 0–2)
LYMPHOCYTES # BLD AUTO: 1.65 THOUSANDS/ΜL (ref 0.6–4.47)
LYMPHOCYTES NFR BLD AUTO: 22 % (ref 14–44)
MCH RBC QN AUTO: 27.6 PG (ref 26.8–34.3)
MCHC RBC AUTO-ENTMCNC: 30.8 G/DL (ref 31.4–37.4)
MCV RBC AUTO: 90 FL (ref 82–98)
MONOCYTES # BLD AUTO: 0.95 THOUSAND/ΜL (ref 0.17–1.22)
MONOCYTES NFR BLD AUTO: 13 % (ref 4–12)
NEUTROPHILS # BLD AUTO: 4.73 THOUSANDS/ΜL (ref 1.85–7.62)
NEUTS SEG NFR BLD AUTO: 63 % (ref 43–75)
NRBC BLD AUTO-RTO: 0 /100 WBCS
PLATELET # BLD AUTO: 306 THOUSANDS/UL (ref 149–390)
PMV BLD AUTO: 8.8 FL (ref 8.9–12.7)
RBC # BLD AUTO: 3.52 MILLION/UL (ref 3.81–5.12)
WBC # BLD AUTO: 7.47 THOUSAND/UL (ref 4.31–10.16)

## 2019-01-04 PROCEDURE — 96367 TX/PROPH/DG ADDL SEQ IV INF: CPT

## 2019-01-04 PROCEDURE — 96415 CHEMO IV INFUSION ADDL HR: CPT

## 2019-01-04 PROCEDURE — 96417 CHEMO IV INFUS EACH ADDL SEQ: CPT

## 2019-01-04 PROCEDURE — 96413 CHEMO IV INFUSION 1 HR: CPT

## 2019-01-04 PROCEDURE — 96375 TX/PRO/DX INJ NEW DRUG ADDON: CPT

## 2019-01-04 PROCEDURE — 85025 COMPLETE CBC W/AUTO DIFF WBC: CPT | Performed by: PHYSICIAN ASSISTANT

## 2019-01-04 RX ADMIN — DIPHENHYDRAMINE HYDROCHLORIDE 25 MG: 50 INJECTION, SOLUTION INTRAMUSCULAR; INTRAVENOUS at 09:27

## 2019-01-04 RX ADMIN — CARBOPLATIN 409 MG: 10 INJECTION, SOLUTION INTRAVENOUS at 14:09

## 2019-01-04 RX ADMIN — PALONOSETRON HYDROCHLORIDE 0.25 MG: 0.25 INJECTION, SOLUTION INTRAVENOUS at 09:54

## 2019-01-04 RX ADMIN — SODIUM CHLORIDE 20 ML/HR: 0.9 INJECTION, SOLUTION INTRAVENOUS at 09:01

## 2019-01-04 RX ADMIN — FAMOTIDINE 20 MG: 10 INJECTION, SOLUTION INTRAVENOUS at 09:55

## 2019-01-04 RX ADMIN — SODIUM CHLORIDE 150 MG: 0.9 INJECTION, SOLUTION INTRAVENOUS at 10:19

## 2019-01-04 RX ADMIN — DEXAMETHASONE SODIUM PHOSPHATE 20 MG: 10 INJECTION, SOLUTION INTRAMUSCULAR; INTRAVENOUS at 09:01

## 2019-01-04 RX ADMIN — PACLITAXEL 305 MG: 6 INJECTION, SOLUTION INTRAVENOUS at 10:58

## 2019-01-04 NOTE — ASSESSMENT & PLAN NOTE
S/p tumor debulking with modified radical hysterectomy, BSO, rectosigmoid resection with reanastomosis, gastrolic omentectomy and peritoneal resection  No gross residual disease at end of procedure  Patient has recovered well  Vaginal cuff is healed

## 2019-01-04 NOTE — PROGRESS NOTES
Assessment/Plan:    Problem List Items Addressed This Visit        Genitourinary    Malignant neoplasm of both ovaries ([facility] [address] ) - Primary     Stage IIIC s/p neoadjuvant chemotherapy and cytoreduction surgery to no gross residual  She is currently receiving adjuvant chemotherapy with taxol 175 mg/m2 and carboplatin AUC 5  She received 1 cycle in the adjuvant setting  Continue with cycle 2 of adjuvant therapy  Metabolic and hematologic parameters reviewed  Return to the office as per her chemotherapy calendar  Other    Postoperative state     S/p tumor debulking with modified radical hysterectomy, BSO, rectosigmoid resection with reanastomosis, gastrolic omentectomy and peritoneal resection  No gross residual disease at end of procedure  Patient has recovered well  Vaginal cuff is healed  Greater than 50% of the visit spent in counseling and coordination of care regarding chemotherapy treatment planning  Less than half the visit time was spent in the post-operative state  CHIEF COMPLAINT:   Pre-chemotherapy and post-operative evaluation    Problem:  Cancer Staging  Malignant neoplasm of both ovaries ([facility] [address] )  Staging form: Ovary, Fallopian Tube, Primary Peritoneal, AJCC 8th Edition  - Clinical: ycT3c - Signed by Armando Jim MD on 12/6/2018        Previous therapy:     Malignant neoplasm of both ovaries ([facility] [address] )    8/16/2018 Biopsy     Paracentesis, metastatic adenocarcinoma, consistent with gyn primary          Chemotherapy     Neoadjuvant chemotherapy with taxol 80 mg/m2 weekly and carboplatin AUC 6 every 21 days  She received 3          11/23/2018 Surgery     Diagnostic laparoscopy, exploratory laparotomy, modified radical hysterectomy, bilateral salpingo-oophorectomy, rectosigmoid resection with end-to-end reanastomosis, gastrocolic omentectomy, peritoneal resection  Residual tumor identified in the ovaries            Chemotherapy     Adjuvant chemotherapy, taxol 175 mg/m2 and carboplatin AUC 5 every 21 days  She received 1 cycle (total cycle 4) and is scheduled for cycle 2  Patient ID: Krupa Yuan is a 77 y o  female  who presents to the office for pre-chemotherapy and post-operative evaluation  She is recovering well from surgery  The patient has been afebrile  Normal bowel/bladder function  No abdominal/incision pain  No vomiting  Patient denies chemotherapy induced neuropathy  She notes upper neck/back pain, which she feels is muscular  The patient denies vaginal bleeding  The following portions of the patient's history were reviewed and updated as appropriate: allergies, current medications, past medical history, past surgical history and problem list     Review of Systems   Constitutional: Positive for fatigue  Negative for fever  HENT: Negative  Eyes: Negative  Respiratory: Negative  Cardiovascular: Negative  Gastrointestinal: Negative  Genitourinary: Negative  Musculoskeletal: Positive for myalgias  Skin: Negative  Neurological: Negative  Negative for numbness  Psychiatric/Behavioral: Negative  Current Outpatient Prescriptions   Medication Sig Dispense Refill    docusate sodium (COLACE) 100 mg capsule Take 1 capsule (100 mg total) by mouth 2 (two) times a day 10 capsule 0    polyethylene glycol (MIRALAX) 17 g packet Take 17 g by mouth daily 14 each 0    XARELTO 20 MG tablet TAKE 1 TABLET BY MOUTH ONCE DAILY WITH BREAKFAST 30 tablet 3     No current facility-administered medications for this visit        Facility-Administered Medications Ordered in Other Visits   Medication Dose Route Frequency Provider Last Rate Last Dose    alteplase (CATHFLO) injection 2 mg  2 mg Intracatheter Once PRN Yani Nolasco MD        CARBOplatin (PARAPLATIN) 409 mg in sodium chloride 0 9 % 250 mL IVPB  409 mg Intravenous Once Yani Nolasco MD        dexamethasone (DECADRON) 20 mg in sodium chloride 0 9 % 50 mL IVPB  20 mg Intravenous Once Rafy Aguirre MD        diphenhydrAMINE (BENADRYL) 25 mg in sodium chloride 0 9 % 50 mL IVPB  25 mg Intravenous Once Rafy Aguirre MD        famotidine (PEPCID) 20 mg in sodium chloride 0 9 % 52 mL IVPB  20 mg Intravenous Once Rafy Aguirre MD        fosaprepitant (EMEND) 150 mg in sodium chloride 0 9 % 250 mL IVPB  150 mg Intravenous Once Rafy Aguirre MD        PACLitaxel (TAXOL) 305 mg in sodium chloride 0 9 % 1,000 mL chemo IVPB  305 mg Intravenous Once Rafy Aguirre MD        palonosetron (ALOXI) injection 0 25 mg  0 25 mg Intravenous Once Rafy Aguirre MD        sodium chloride 0 9 % infusion  20 mL/hr Intravenous Once Rafy Aguirre MD               Objective:    Blood pressure 122/72, pulse (!) 108, temperature 97 5 °F (36 4 °C), temperature source Oral, height 5' 2 21" (1 58 m), weight 72 3 kg (159 lb 8 oz)  Body mass index is 28 98 kg/m²  Body surface area is 1 74 meters squared  Physical Exam   Constitutional: She is oriented to person, place, and time  She appears well-developed and well-nourished  Pulmonary/Chest: Effort normal    Abdominal: Soft  She exhibits no mass  There is no tenderness  Genitourinary:   Genitourinary Comments: The external female genitalia is normal  The bartholin's, uretheral and skenes glands are normal  The urethral meatus is normal (midline with no lesions)  Anus without fissure or lesion  Speculum exam reveals a grossly normal vagina  Vagina is intact  No masses, lesions, discharge or bleeding  No significant cystocele or rectocele noted  Bimanual exam notes a surgical absent cervix, uterus and adnexal structures  Vaginal apex is mobile  No masses or fullness  Bladder is without fullness, mass or tenderness  Neurological: She is alert and oriented to person, place, and time  Skin: Skin is warm and dry  No rash noted  Midline incision is intact and well healed  Psychiatric: She has a normal mood and affect   Her behavior is normal  Thought content normal        Lab Results   Component Value Date     12 3 01/02/2019     Lab Results   Component Value Date    K 3 6 01/02/2019    CL 98 (L) 01/02/2019    CO2 27 01/02/2019    BUN 16 01/02/2019    CREATININE 0 75 01/02/2019    CALCIUM 8 9 01/02/2019    AST 12 01/02/2019    ALT 13 01/02/2019    ALKPHOS 148 (H) 01/02/2019    EGFR 76 01/02/2019     Lab Results   Component Value Date    WBC 9 48 01/02/2019    HGB 10 2 (L) 01/02/2019    HCT 33 2 (L) 01/02/2019    MCV 90 01/02/2019     01/02/2019     Lab Results   Component Value Date    NEUTROABS 5 73 01/02/2019

## 2019-01-04 NOTE — ASSESSMENT & PLAN NOTE
Stage IIIC s/p neoadjuvant chemotherapy and cytoreduction surgery to no gross residual  She is currently receiving adjuvant chemotherapy with taxol 175 mg/m2 and carboplatin AUC 5  She received 1 cycle in the adjuvant setting  Continue with cycle 2 of adjuvant therapy  Metabolic and hematologic parameters reviewed  Return to the office as per her chemotherapy calendar

## 2019-01-04 NOTE — PLAN OF CARE
Problem: Potential for Falls  Goal: Patient will remain free of falls  INTERVENTIONS:  - Assess patient frequently for physical needs  -  Identify cognitive and physical deficits and behaviors that affect risk of falls    -  [place] fall precautions as indicated by assessment   - Educate patient/family on patient safety including physical limitations  - Instruct patient to call for assistance with activity based on assessment  - Modify environment to reduce risk of injury  - Consider OT/PT consult to assist with strengthening/mobility   Outcome: Progressing

## 2019-01-09 ENCOUNTER — LAB (OUTPATIENT)
Dept: LAB | Facility: HOSPITAL | Age: 79
End: 2019-01-09
Payer: MEDICARE

## 2019-01-09 DIAGNOSIS — C56.9 MALIGNANT NEOPLASM OF OVARY, UNSPECIFIED LATERALITY (HCC): ICD-10-CM

## 2019-01-09 LAB
ALBUMIN SERPL BCP-MCNC: 3.1 G/DL (ref 3.5–5)
ALP SERPL-CCNC: 124 U/L (ref 46–116)
ALT SERPL W P-5'-P-CCNC: 16 U/L (ref 12–78)
ANION GAP SERPL CALCULATED.3IONS-SCNC: 7 MMOL/L (ref 4–13)
AST SERPL W P-5'-P-CCNC: 17 U/L (ref 5–45)
BASOPHILS # BLD MANUAL: 0.07 THOUSAND/UL (ref 0–0.1)
BASOPHILS NFR MAR MANUAL: 2 % (ref 0–1)
BILIRUB SERPL-MCNC: 0.2 MG/DL (ref 0.2–1)
BUN SERPL-MCNC: 19 MG/DL (ref 5–25)
CALCIUM SERPL-MCNC: 9.3 MG/DL (ref 8.3–10.1)
CHLORIDE SERPL-SCNC: 101 MMOL/L (ref 100–108)
CO2 SERPL-SCNC: 31 MMOL/L (ref 21–32)
CREAT SERPL-MCNC: 0.7 MG/DL (ref 0.6–1.3)
EOSINOPHIL # BLD MANUAL: 0.04 THOUSAND/UL (ref 0–0.4)
EOSINOPHIL NFR BLD MANUAL: 1 % (ref 0–6)
ERYTHROCYTE [DISTWIDTH] IN BLOOD BY AUTOMATED COUNT: 14 % (ref 11.6–15.1)
GFR SERPL CREATININE-BSD FRML MDRD: 83 ML/MIN/1.73SQ M
GLUCOSE SERPL-MCNC: 115 MG/DL (ref 65–140)
HCT VFR BLD AUTO: 35.5 % (ref 34.8–46.1)
HGB BLD-MCNC: 10.6 G/DL (ref 11.5–15.4)
LYMPHOCYTES # BLD AUTO: 1.52 THOUSAND/UL (ref 0.6–4.47)
LYMPHOCYTES # BLD AUTO: 42 % (ref 14–44)
MAGNESIUM SERPL-MCNC: 1.7 MG/DL (ref 1.6–2.6)
MCH RBC QN AUTO: 26.8 PG (ref 26.8–34.3)
MCHC RBC AUTO-ENTMCNC: 29.9 G/DL (ref 31.4–37.4)
MCV RBC AUTO: 90 FL (ref 82–98)
MONOCYTES # BLD AUTO: 0.07 THOUSAND/UL (ref 0–1.22)
MONOCYTES NFR BLD: 2 % (ref 4–12)
NEUTROPHILS # BLD MANUAL: 1.92 THOUSAND/UL (ref 1.85–7.62)
NEUTS BAND NFR BLD MANUAL: 3 % (ref 0–8)
NEUTS SEG NFR BLD AUTO: 50 % (ref 43–75)
NRBC BLD AUTO-RTO: 0 /100 WBCS
PLATELET # BLD AUTO: 273 THOUSANDS/UL (ref 149–390)
PLATELET BLD QL SMEAR: ADEQUATE
PMV BLD AUTO: 9.4 FL (ref 8.9–12.7)
POTASSIUM SERPL-SCNC: 3.7 MMOL/L (ref 3.5–5.3)
PROT SERPL-MCNC: 7.4 G/DL (ref 6.4–8.2)
RBC # BLD AUTO: 3.96 MILLION/UL (ref 3.81–5.12)
RBC MORPH BLD: NORMAL
SODIUM SERPL-SCNC: 139 MMOL/L (ref 136–145)
TOTAL CELLS COUNTED SPEC: 100
WBC # BLD AUTO: 3.62 THOUSAND/UL (ref 4.31–10.16)

## 2019-01-09 PROCEDURE — 85027 COMPLETE CBC AUTOMATED: CPT | Performed by: PHYSICIAN ASSISTANT

## 2019-01-09 PROCEDURE — 80053 COMPREHEN METABOLIC PANEL: CPT | Performed by: PHYSICIAN ASSISTANT

## 2019-01-09 PROCEDURE — 85007 BL SMEAR W/DIFF WBC COUNT: CPT | Performed by: PHYSICIAN ASSISTANT

## 2019-01-09 PROCEDURE — 36415 COLL VENOUS BLD VENIPUNCTURE: CPT | Performed by: PHYSICIAN ASSISTANT

## 2019-01-09 PROCEDURE — 83735 ASSAY OF MAGNESIUM: CPT

## 2019-01-16 ENCOUNTER — APPOINTMENT (OUTPATIENT)
Dept: LAB | Facility: HOSPITAL | Age: 79
End: 2019-01-16
Attending: OBSTETRICS & GYNECOLOGY
Payer: MEDICARE

## 2019-01-16 ENCOUNTER — TRANSCRIBE ORDERS (OUTPATIENT)
Dept: ADMINISTRATIVE | Facility: HOSPITAL | Age: 79
End: 2019-01-16

## 2019-01-16 DIAGNOSIS — C56.9 MALIGNANT NEOPLASM OF OVARY, UNSPECIFIED LATERALITY (HCC): ICD-10-CM

## 2019-01-16 DIAGNOSIS — C56.9 MALIGNANT NEOPLASM OF OVARY, UNSPECIFIED LATERALITY (HCC): Primary | ICD-10-CM

## 2019-01-16 LAB
ALBUMIN SERPL BCP-MCNC: 3.1 G/DL (ref 3.5–5)
ALP SERPL-CCNC: 134 U/L (ref 46–116)
ALT SERPL W P-5'-P-CCNC: 12 U/L (ref 12–78)
ANION GAP SERPL CALCULATED.3IONS-SCNC: 10 MMOL/L (ref 4–13)
AST SERPL W P-5'-P-CCNC: 11 U/L (ref 5–45)
BASOPHILS # BLD AUTO: 0.04 THOUSANDS/ΜL (ref 0–0.1)
BASOPHILS NFR BLD AUTO: 2 % (ref 0–1)
BILIRUB SERPL-MCNC: 0.2 MG/DL (ref 0.2–1)
BUN SERPL-MCNC: 20 MG/DL (ref 5–25)
CALCIUM SERPL-MCNC: 9.1 MG/DL (ref 8.3–10.1)
CANCER AG125 SERPL-ACNC: 12 U/ML (ref 0–30)
CHLORIDE SERPL-SCNC: 102 MMOL/L (ref 100–108)
CO2 SERPL-SCNC: 28 MMOL/L (ref 21–32)
CREAT SERPL-MCNC: 0.73 MG/DL (ref 0.6–1.3)
EOSINOPHIL # BLD AUTO: 0.07 THOUSAND/ΜL (ref 0–0.61)
EOSINOPHIL NFR BLD AUTO: 3 % (ref 0–6)
ERYTHROCYTE [DISTWIDTH] IN BLOOD BY AUTOMATED COUNT: 14.6 % (ref 11.6–15.1)
GFR SERPL CREATININE-BSD FRML MDRD: 79 ML/MIN/1.73SQ M
GLUCOSE SERPL-MCNC: 104 MG/DL (ref 65–140)
HCT VFR BLD AUTO: 34.2 % (ref 34.8–46.1)
HGB BLD-MCNC: 10.4 G/DL (ref 11.5–15.4)
IMM GRANULOCYTES # BLD AUTO: 0 THOUSAND/UL (ref 0–0.2)
IMM GRANULOCYTES NFR BLD AUTO: 0 % (ref 0–2)
LYMPHOCYTES # BLD AUTO: 1.53 THOUSANDS/ΜL (ref 0.6–4.47)
LYMPHOCYTES NFR BLD AUTO: 55 % (ref 14–44)
MAGNESIUM SERPL-MCNC: 1.5 MG/DL (ref 1.6–2.6)
MCH RBC QN AUTO: 27.2 PG (ref 26.8–34.3)
MCHC RBC AUTO-ENTMCNC: 30.4 G/DL (ref 31.4–37.4)
MCV RBC AUTO: 90 FL (ref 82–98)
MONOCYTES # BLD AUTO: 0.57 THOUSAND/ΜL (ref 0.17–1.22)
MONOCYTES NFR BLD AUTO: 21 % (ref 4–12)
NEUTROPHILS # BLD AUTO: 0.51 THOUSANDS/ΜL (ref 1.85–7.62)
NEUTS SEG NFR BLD AUTO: 19 % (ref 43–75)
NRBC BLD AUTO-RTO: 0 /100 WBCS
PLATELET # BLD AUTO: 295 THOUSANDS/UL (ref 149–390)
PMV BLD AUTO: 9 FL (ref 8.9–12.7)
POTASSIUM SERPL-SCNC: 3.5 MMOL/L (ref 3.5–5.3)
PROT SERPL-MCNC: 7 G/DL (ref 6.4–8.2)
RBC # BLD AUTO: 3.82 MILLION/UL (ref 3.81–5.12)
SODIUM SERPL-SCNC: 140 MMOL/L (ref 136–145)
WBC # BLD AUTO: 2.72 THOUSAND/UL (ref 4.31–10.16)

## 2019-01-16 PROCEDURE — 80053 COMPREHEN METABOLIC PANEL: CPT | Performed by: OBSTETRICS & GYNECOLOGY

## 2019-01-16 PROCEDURE — 83735 ASSAY OF MAGNESIUM: CPT

## 2019-01-16 PROCEDURE — 86304 IMMUNOASSAY TUMOR CA 125: CPT

## 2019-01-16 PROCEDURE — 85025 COMPLETE CBC W/AUTO DIFF WBC: CPT | Performed by: OBSTETRICS & GYNECOLOGY

## 2019-01-16 PROCEDURE — 36415 COLL VENOUS BLD VENIPUNCTURE: CPT | Performed by: OBSTETRICS & GYNECOLOGY

## 2019-01-17 DIAGNOSIS — D70.1 CHEMOTHERAPY INDUCED NEUTROPENIA (HCC): Primary | ICD-10-CM

## 2019-01-17 DIAGNOSIS — T45.1X5A CHEMOTHERAPY INDUCED NEUTROPENIA (HCC): Primary | ICD-10-CM

## 2019-01-18 ENCOUNTER — APPOINTMENT (OUTPATIENT)
Dept: LAB | Facility: HOSPITAL | Age: 79
End: 2019-01-18
Payer: MEDICARE

## 2019-01-18 DIAGNOSIS — D70.1 CHEMOTHERAPY INDUCED NEUTROPENIA (HCC): Primary | ICD-10-CM

## 2019-01-18 DIAGNOSIS — T45.1X5A CHEMOTHERAPY INDUCED NEUTROPENIA (HCC): ICD-10-CM

## 2019-01-18 DIAGNOSIS — C56.9 MALIGNANT NEOPLASM OF OVARY, UNSPECIFIED LATERALITY (HCC): ICD-10-CM

## 2019-01-18 DIAGNOSIS — D70.1 CHEMOTHERAPY INDUCED NEUTROPENIA (HCC): ICD-10-CM

## 2019-01-18 DIAGNOSIS — T45.1X5A CHEMOTHERAPY INDUCED NEUTROPENIA (HCC): Primary | ICD-10-CM

## 2019-01-18 LAB
ALBUMIN SERPL BCP-MCNC: 3.1 G/DL (ref 3.5–5)
ALP SERPL-CCNC: 141 U/L (ref 46–116)
ALT SERPL W P-5'-P-CCNC: 12 U/L (ref 12–78)
ANION GAP SERPL CALCULATED.3IONS-SCNC: 8 MMOL/L (ref 4–13)
AST SERPL W P-5'-P-CCNC: 17 U/L (ref 5–45)
BASOPHILS # BLD AUTO: 0.04 THOUSANDS/ΜL (ref 0–0.1)
BASOPHILS NFR BLD AUTO: 2 % (ref 0–1)
BILIRUB SERPL-MCNC: 0.2 MG/DL (ref 0.2–1)
BUN SERPL-MCNC: 18 MG/DL (ref 5–25)
CALCIUM SERPL-MCNC: 9.2 MG/DL (ref 8.3–10.1)
CANCER AG125 SERPL-ACNC: 13.6 U/ML (ref 0–30)
CHLORIDE SERPL-SCNC: 102 MMOL/L (ref 100–108)
CO2 SERPL-SCNC: 29 MMOL/L (ref 21–32)
CREAT SERPL-MCNC: 0.71 MG/DL (ref 0.6–1.3)
EOSINOPHIL # BLD AUTO: 0.06 THOUSAND/ΜL (ref 0–0.61)
EOSINOPHIL NFR BLD AUTO: 2 % (ref 0–6)
ERYTHROCYTE [DISTWIDTH] IN BLOOD BY AUTOMATED COUNT: 14.6 % (ref 11.6–15.1)
GFR SERPL CREATININE-BSD FRML MDRD: 81 ML/MIN/1.73SQ M
GLUCOSE SERPL-MCNC: 99 MG/DL (ref 65–140)
HCT VFR BLD AUTO: 33.9 % (ref 34.8–46.1)
HGB BLD-MCNC: 10.2 G/DL (ref 11.5–15.4)
IMM GRANULOCYTES # BLD AUTO: 0.01 THOUSAND/UL (ref 0–0.2)
IMM GRANULOCYTES NFR BLD AUTO: 0 % (ref 0–2)
LYMPHOCYTES # BLD AUTO: 1.31 THOUSANDS/ΜL (ref 0.6–4.47)
LYMPHOCYTES NFR BLD AUTO: 53 % (ref 14–44)
MAGNESIUM SERPL-MCNC: 1.6 MG/DL (ref 1.6–2.6)
MCH RBC QN AUTO: 26.9 PG (ref 26.8–34.3)
MCHC RBC AUTO-ENTMCNC: 30.1 G/DL (ref 31.4–37.4)
MCV RBC AUTO: 89 FL (ref 82–98)
MONOCYTES # BLD AUTO: 0.74 THOUSAND/ΜL (ref 0.17–1.22)
MONOCYTES NFR BLD AUTO: 30 % (ref 4–12)
NEUTROPHILS # BLD AUTO: 0.33 THOUSANDS/ΜL (ref 1.85–7.62)
NEUTS SEG NFR BLD AUTO: 13 % (ref 43–75)
NRBC BLD AUTO-RTO: 0 /100 WBCS
PLATELET # BLD AUTO: 313 THOUSANDS/UL (ref 149–390)
PMV BLD AUTO: 9 FL (ref 8.9–12.7)
POTASSIUM SERPL-SCNC: 3.7 MMOL/L (ref 3.5–5.3)
PROT SERPL-MCNC: 7.2 G/DL (ref 6.4–8.2)
RBC # BLD AUTO: 3.79 MILLION/UL (ref 3.81–5.12)
SODIUM SERPL-SCNC: 139 MMOL/L (ref 136–145)
WBC # BLD AUTO: 2.49 THOUSAND/UL (ref 4.31–10.16)

## 2019-01-18 PROCEDURE — 80053 COMPREHEN METABOLIC PANEL: CPT | Performed by: PHYSICIAN ASSISTANT

## 2019-01-18 PROCEDURE — 86304 IMMUNOASSAY TUMOR CA 125: CPT

## 2019-01-18 PROCEDURE — 83735 ASSAY OF MAGNESIUM: CPT

## 2019-01-18 PROCEDURE — 36415 COLL VENOUS BLD VENIPUNCTURE: CPT | Performed by: PHYSICIAN ASSISTANT

## 2019-01-18 PROCEDURE — 85025 COMPLETE CBC W/AUTO DIFF WBC: CPT

## 2019-01-21 ENCOUNTER — APPOINTMENT (OUTPATIENT)
Dept: LAB | Facility: HOSPITAL | Age: 79
End: 2019-01-21
Payer: MEDICARE

## 2019-01-21 DIAGNOSIS — T45.1X5A CHEMOTHERAPY INDUCED NEUTROPENIA (HCC): ICD-10-CM

## 2019-01-21 DIAGNOSIS — D70.1 CHEMOTHERAPY INDUCED NEUTROPENIA (HCC): ICD-10-CM

## 2019-01-21 DIAGNOSIS — C56.9 MALIGNANT NEOPLASM OF OVARY, UNSPECIFIED LATERALITY (HCC): ICD-10-CM

## 2019-01-21 LAB
ALBUMIN SERPL BCP-MCNC: 3 G/DL (ref 3.5–5)
ALP SERPL-CCNC: 150 U/L (ref 46–116)
ALT SERPL W P-5'-P-CCNC: 12 U/L (ref 12–78)
ANION GAP SERPL CALCULATED.3IONS-SCNC: 9 MMOL/L (ref 4–13)
AST SERPL W P-5'-P-CCNC: 13 U/L (ref 5–45)
BASOPHILS # BLD AUTO: 0.03 THOUSANDS/ΜL (ref 0–0.1)
BASOPHILS NFR BLD AUTO: 1 % (ref 0–1)
BILIRUB SERPL-MCNC: 0.3 MG/DL (ref 0.2–1)
BUN SERPL-MCNC: 18 MG/DL (ref 5–25)
CALCIUM SERPL-MCNC: 9.4 MG/DL (ref 8.3–10.1)
CHLORIDE SERPL-SCNC: 100 MMOL/L (ref 100–108)
CO2 SERPL-SCNC: 30 MMOL/L (ref 21–32)
CREAT SERPL-MCNC: 0.85 MG/DL (ref 0.6–1.3)
EOSINOPHIL # BLD AUTO: 0.05 THOUSAND/ΜL (ref 0–0.61)
EOSINOPHIL NFR BLD AUTO: 1 % (ref 0–6)
ERYTHROCYTE [DISTWIDTH] IN BLOOD BY AUTOMATED COUNT: 14.3 % (ref 11.6–15.1)
GFR SERPL CREATININE-BSD FRML MDRD: 65 ML/MIN/1.73SQ M
GLUCOSE P FAST SERPL-MCNC: 110 MG/DL (ref 65–99)
HCT VFR BLD AUTO: 36 % (ref 34.8–46.1)
HGB BLD-MCNC: 11 G/DL (ref 11.5–15.4)
IMM GRANULOCYTES # BLD AUTO: 0.05 THOUSAND/UL (ref 0–0.2)
IMM GRANULOCYTES NFR BLD AUTO: 1 % (ref 0–2)
LYMPHOCYTES # BLD AUTO: 1.83 THOUSANDS/ΜL (ref 0.6–4.47)
LYMPHOCYTES NFR BLD AUTO: 29 % (ref 14–44)
MAGNESIUM SERPL-MCNC: 1.8 MG/DL (ref 1.6–2.6)
MCH RBC QN AUTO: 26.8 PG (ref 26.8–34.3)
MCHC RBC AUTO-ENTMCNC: 30.6 G/DL (ref 31.4–37.4)
MCV RBC AUTO: 88 FL (ref 82–98)
MONOCYTES # BLD AUTO: 1.43 THOUSAND/ΜL (ref 0.17–1.22)
MONOCYTES NFR BLD AUTO: 23 % (ref 4–12)
NEUTROPHILS # BLD AUTO: 2.97 THOUSANDS/ΜL (ref 1.85–7.62)
NEUTS SEG NFR BLD AUTO: 45 % (ref 43–75)
NRBC BLD AUTO-RTO: 0 /100 WBCS
PLATELET # BLD AUTO: 295 THOUSANDS/UL (ref 149–390)
PMV BLD AUTO: 8.9 FL (ref 8.9–12.7)
POTASSIUM SERPL-SCNC: 3.4 MMOL/L (ref 3.5–5.3)
PROT SERPL-MCNC: 7.7 G/DL (ref 6.4–8.2)
RBC # BLD AUTO: 4.1 MILLION/UL (ref 3.81–5.12)
SODIUM SERPL-SCNC: 139 MMOL/L (ref 136–145)
WBC # BLD AUTO: 6.36 THOUSAND/UL (ref 4.31–10.16)

## 2019-01-21 PROCEDURE — 85025 COMPLETE CBC W/AUTO DIFF WBC: CPT

## 2019-01-21 PROCEDURE — 36415 COLL VENOUS BLD VENIPUNCTURE: CPT | Performed by: PHYSICIAN ASSISTANT

## 2019-01-21 PROCEDURE — 80053 COMPREHEN METABOLIC PANEL: CPT | Performed by: PHYSICIAN ASSISTANT

## 2019-01-21 PROCEDURE — 83735 ASSAY OF MAGNESIUM: CPT

## 2019-01-23 ENCOUNTER — TELEPHONE (OUTPATIENT)
Dept: RADIOLOGY | Facility: HOSPITAL | Age: 79
End: 2019-01-23

## 2019-01-23 ENCOUNTER — TRANSCRIBE ORDERS (OUTPATIENT)
Dept: ADMINISTRATIVE | Facility: HOSPITAL | Age: 79
End: 2019-01-23

## 2019-01-23 ENCOUNTER — APPOINTMENT (OUTPATIENT)
Dept: LAB | Facility: HOSPITAL | Age: 79
End: 2019-01-23
Payer: MEDICARE

## 2019-01-23 DIAGNOSIS — C56.9 MALIGNANT NEOPLASM OF OVARY, UNSPECIFIED LATERALITY (HCC): ICD-10-CM

## 2019-01-23 DIAGNOSIS — C56.9 MALIGNANT NEOPLASM OF OVARY, UNSPECIFIED LATERALITY (HCC): Primary | ICD-10-CM

## 2019-01-23 LAB
ALBUMIN SERPL BCP-MCNC: 3 G/DL (ref 3.5–5)
ALP SERPL-CCNC: 145 U/L (ref 46–116)
ALT SERPL W P-5'-P-CCNC: 13 U/L (ref 12–78)
ANION GAP SERPL CALCULATED.3IONS-SCNC: 10 MMOL/L (ref 4–13)
AST SERPL W P-5'-P-CCNC: 15 U/L (ref 5–45)
BASOPHILS # BLD AUTO: 0.04 THOUSANDS/ΜL (ref 0–0.1)
BASOPHILS NFR BLD AUTO: 1 % (ref 0–1)
BILIRUB SERPL-MCNC: 0.2 MG/DL (ref 0.2–1)
BUN SERPL-MCNC: 17 MG/DL (ref 5–25)
CALCIUM SERPL-MCNC: 9.3 MG/DL (ref 8.3–10.1)
CHLORIDE SERPL-SCNC: 101 MMOL/L (ref 100–108)
CO2 SERPL-SCNC: 29 MMOL/L (ref 21–32)
CREAT SERPL-MCNC: 0.83 MG/DL (ref 0.6–1.3)
EOSINOPHIL # BLD AUTO: 0.06 THOUSAND/ΜL (ref 0–0.61)
EOSINOPHIL NFR BLD AUTO: 1 % (ref 0–6)
ERYTHROCYTE [DISTWIDTH] IN BLOOD BY AUTOMATED COUNT: 14.6 % (ref 11.6–15.1)
GFR SERPL CREATININE-BSD FRML MDRD: 67 ML/MIN/1.73SQ M
GLUCOSE SERPL-MCNC: 124 MG/DL (ref 65–140)
HCT VFR BLD AUTO: 35.6 % (ref 34.8–46.1)
HGB BLD-MCNC: 10.7 G/DL (ref 11.5–15.4)
IMM GRANULOCYTES # BLD AUTO: 0.06 THOUSAND/UL (ref 0–0.2)
IMM GRANULOCYTES NFR BLD AUTO: 1 % (ref 0–2)
LYMPHOCYTES # BLD AUTO: 2.07 THOUSANDS/ΜL (ref 0.6–4.47)
LYMPHOCYTES NFR BLD AUTO: 26 % (ref 14–44)
MAGNESIUM SERPL-MCNC: 1.8 MG/DL (ref 1.6–2.6)
MCH RBC QN AUTO: 26.2 PG (ref 26.8–34.3)
MCHC RBC AUTO-ENTMCNC: 30.1 G/DL (ref 31.4–37.4)
MCV RBC AUTO: 87 FL (ref 82–98)
MONOCYTES # BLD AUTO: 1 THOUSAND/ΜL (ref 0.17–1.22)
MONOCYTES NFR BLD AUTO: 12 % (ref 4–12)
NEUTROPHILS # BLD AUTO: 4.81 THOUSANDS/ΜL (ref 1.85–7.62)
NEUTS SEG NFR BLD AUTO: 59 % (ref 43–75)
NRBC BLD AUTO-RTO: 0 /100 WBCS
PLATELET # BLD AUTO: 281 THOUSANDS/UL (ref 149–390)
PMV BLD AUTO: 10 FL (ref 8.9–12.7)
POTASSIUM SERPL-SCNC: 3.7 MMOL/L (ref 3.5–5.3)
PROT SERPL-MCNC: 7.6 G/DL (ref 6.4–8.2)
RBC # BLD AUTO: 4.08 MILLION/UL (ref 3.81–5.12)
SODIUM SERPL-SCNC: 140 MMOL/L (ref 136–145)
WBC # BLD AUTO: 8.04 THOUSAND/UL (ref 4.31–10.16)

## 2019-01-23 PROCEDURE — 85025 COMPLETE CBC W/AUTO DIFF WBC: CPT

## 2019-01-23 PROCEDURE — 86304 IMMUNOASSAY TUMOR CA 125: CPT

## 2019-01-23 PROCEDURE — 80053 COMPREHEN METABOLIC PANEL: CPT

## 2019-01-23 PROCEDURE — 83735 ASSAY OF MAGNESIUM: CPT

## 2019-01-23 PROCEDURE — 36415 COLL VENOUS BLD VENIPUNCTURE: CPT

## 2019-01-23 RX ORDER — SODIUM CHLORIDE 9 MG/ML
75 INJECTION, SOLUTION INTRAVENOUS CONTINUOUS
Status: CANCELLED | OUTPATIENT
Start: 2019-01-30

## 2019-01-24 ENCOUNTER — TELEPHONE (OUTPATIENT)
Dept: RADIOLOGY | Facility: HOSPITAL | Age: 79
End: 2019-01-24

## 2019-01-24 ENCOUNTER — OFFICE VISIT (OUTPATIENT)
Dept: GYNECOLOGIC ONCOLOGY | Facility: CLINIC | Age: 79
End: 2019-01-24
Payer: MEDICARE

## 2019-01-24 VITALS
TEMPERATURE: 98.3 F | DIASTOLIC BLOOD PRESSURE: 84 MMHG | SYSTOLIC BLOOD PRESSURE: 162 MMHG | HEIGHT: 62 IN | BODY MASS INDEX: 29.35 KG/M2 | RESPIRATION RATE: 23 BRPM | WEIGHT: 159.5 LBS | HEART RATE: 86 BPM

## 2019-01-24 DIAGNOSIS — G62.0 CHEMOTHERAPY-INDUCED NEUROPATHY (HCC): ICD-10-CM

## 2019-01-24 DIAGNOSIS — T45.1X5A CHEMOTHERAPY INDUCED NEUTROPENIA (HCC): ICD-10-CM

## 2019-01-24 DIAGNOSIS — D70.1 CHEMOTHERAPY INDUCED NEUTROPENIA (HCC): ICD-10-CM

## 2019-01-24 DIAGNOSIS — C56.3 MALIGNANT NEOPLASM OF BOTH OVARIES (HCC): Primary | ICD-10-CM

## 2019-01-24 DIAGNOSIS — T45.1X5A CHEMOTHERAPY-INDUCED NEUROPATHY (HCC): ICD-10-CM

## 2019-01-24 LAB — CANCER AG125 SERPL-ACNC: 11.6 U/ML (ref 0–30)

## 2019-01-24 PROCEDURE — 99024 POSTOP FOLLOW-UP VISIT: CPT | Performed by: PHYSICIAN ASSISTANT

## 2019-01-24 RX ORDER — SODIUM CHLORIDE 9 MG/ML
20 INJECTION, SOLUTION INTRAVENOUS ONCE
Status: COMPLETED | OUTPATIENT
Start: 2019-01-25 | End: 2019-01-25

## 2019-01-24 RX ORDER — PALONOSETRON 0.05 MG/ML
0.25 INJECTION, SOLUTION INTRAVENOUS ONCE
Status: COMPLETED | OUTPATIENT
Start: 2019-01-25 | End: 2019-01-25

## 2019-01-24 NOTE — ASSESSMENT & PLAN NOTE
Humberto ANC 0 33  Plan to dose reduce taxol to 135 mg/m2  Carboplatin previously dose-reduced to AUC 5  Patient with previous allergic reaction to granix  Monitor closely

## 2019-01-24 NOTE — ASSESSMENT & PLAN NOTE
Grade 1, fingertips  Intermittent, not affecting ADLs  Taxol dose-reduce to 135 mg/m2  Continue to monitor

## 2019-01-24 NOTE — ASSESSMENT & PLAN NOTE
Stage IIIC s/p neoadjuvant chemotherapy and cytoreduction surgery to no gross residual  She is currently receiving adjuvant chemotherapy with taxol 175 mg/m2 and carboplatin AUC 5  She received 2 cycles in the adjuvant setting  Overall, she is tolerating treatment well with the exception of chemotherapy-induced neutropenia and grade 1 peripheral neuropathy       Continue with cycle 3 of adjuvant therapy  Metabolic and hematologic parameters reviewed  Plan to dose-reduce taxol to 135 mg/m2 due to grade 1 peripheral neuropathy and khushi ANC 0 33, with prior allergic reaction to granix       Plan for CT imaging after completion of cycle 6  Return to the office in 4 weeks for post-treatment evaluation

## 2019-01-24 NOTE — PROGRESS NOTES
Assessment/Plan:    Problem List Items Addressed This Visit        Nervous and Auditory    Chemotherapy-induced neuropathy (HCC)     Grade 1, fingertips  Intermittent, not affecting ADLs  Taxol dose-reduce to 135 mg/m2  Continue to monitor  Genitourinary    Malignant neoplasm of both ovaries ([facility] [address] ) - Primary     Stage IIIC s/p neoadjuvant chemotherapy and cytoreduction surgery to no gross residual  She is currently receiving adjuvant chemotherapy with taxol 175 mg/m2 and carboplatin AUC 5  She received 2 cycles in the adjuvant setting  Overall, she is tolerating treatment well with the exception of chemotherapy-induced neutropenia and grade 1 peripheral neuropathy       Continue with cycle 3 of adjuvant therapy  Metabolic and hematologic parameters reviewed  Plan to dose-reduce taxol to 135 mg/m2 due to grade 1 peripheral neuropathy and khushi ANC 0 33, with prior allergic reaction to granix       Plan for CT imaging after completion of cycle 6  Return to the office in 4 weeks for post-treatment evaluation  Relevant Orders    CT chest abdomen pelvis w contrast       Other    Chemotherapy induced neutropenia (HCC)     Khushi ANC 0 33  Plan to dose reduce taxol to 135 mg/m2  Carboplatin previously dose-reduced to AUC 5  Patient with previous allergic reaction to granix  Monitor closely  CHIEF COMPLAINT:   Pre-chemotherapy evaluation    Problem:  Cancer Staging  Malignant neoplasm of both ovaries ([facility] [address] )  Staging form: Ovary, Fallopian Tube, Primary Peritoneal, AJCC 8th Edition  - Clinical: ycT3c - Signed by Frida Mills MD on 12/6/2018        Previous therapy:     Malignant neoplasm of both ovaries ([facility] [address] )    8/16/2018 Biopsy     Paracentesis, metastatic adenocarcinoma, consistent with gyn primary          Chemotherapy     Neoadjuvant chemotherapy with taxol 80 mg/m2 weekly and carboplatin AUC 6 every 21 days   She received 3          11/23/2018 Surgery     Diagnostic laparoscopy, exploratory laparotomy, modified radical hysterectomy, bilateral salpingo-oophorectomy, rectosigmoid resection with end-to-end reanastomosis, gastrocolic omentectomy, peritoneal resection  Residual tumor identified in the ovaries  Chemotherapy     Adjuvant chemotherapy, taxol 175 mg/m2 and carboplatin AUC 5 every 21 days  She received 2 cycles (total cycle 5) and is scheduled for cycle 3  Patient ID: Denia Kuhn is a 77 y o  female  who presents to the office for pre-chemotherapy evaluation  She has been afebrile  The patient is tolerating treatment well  No abdominal pain/nausea/vomiting  She notes fatigue following treatment  Her appetite is appropriate  Normal bowel/bladder function  Patient notes intermittent neuropathy in her fingertips over the past few weeks  She denies interference with her ADLs  The following portions of the patient's history were reviewed and updated as appropriate: allergies, current medications, past medical history and problem list     Review of Systems   Constitutional: Positive for fatigue  Negative for fever  HENT: Negative  Eyes: Negative  Respiratory: Negative  Cardiovascular: Negative  Gastrointestinal: Negative  Genitourinary: Negative  Musculoskeletal: Negative  Skin: Negative  Neurological: Positive for numbness (intermittent, fingertips)  Psychiatric/Behavioral: Negative  Current Outpatient Prescriptions   Medication Sig Dispense Refill    docusate sodium (COLACE) 100 mg capsule Take 1 capsule (100 mg total) by mouth 2 (two) times a day 10 capsule 0    polyethylene glycol (MIRALAX) 17 g packet Take 17 g by mouth daily 14 each 0    XARELTO 20 MG tablet TAKE 1 TABLET BY MOUTH ONCE DAILY WITH BREAKFAST 30 tablet 3     No current facility-administered medications for this visit  Objective:    Blood pressure 162/84, pulse 86, temperature 98 3 °F (36 8 °C), temperature source Oral, resp   rate (!) 23, height 5' 2" (1 575 m), weight 72 3 kg (159 lb 8 oz)  Body mass index is 29 17 kg/m²  Body surface area is 1 74 meters squared  Physical Exam   Constitutional: She is oriented to person, place, and time  She appears well-developed and well-nourished  Pulmonary/Chest: Effort normal    Neurological: She is alert and oriented to person, place, and time  Skin: Skin is warm and dry  No rash noted  Psychiatric: She has a normal mood and affect  Her behavior is normal    Performance status is zero       Lab Results   Component Value Date     13 6 01/18/2019     Lab Results   Component Value Date    K 3 7 01/23/2019     01/23/2019    CO2 29 01/23/2019    BUN 17 01/23/2019    CREATININE 0 83 01/23/2019    GLUF 110 (H) 01/21/2019    CALCIUM 9 3 01/23/2019    AST 15 01/23/2019    ALT 13 01/23/2019    ALKPHOS 145 (H) 01/23/2019    EGFR 67 01/23/2019     Lab Results   Component Value Date    WBC 8 04 01/23/2019    HGB 10 7 (L) 01/23/2019    HCT 35 6 01/23/2019    MCV 87 01/23/2019     01/23/2019     Lab Results   Component Value Date    NEUTROABS 4 81 01/23/2019

## 2019-01-25 ENCOUNTER — HOSPITAL ENCOUNTER (OUTPATIENT)
Dept: INFUSION CENTER | Facility: HOSPITAL | Age: 79
Discharge: HOME | End: 2019-01-25
Payer: MEDICARE

## 2019-01-25 VITALS
BODY MASS INDEX: 29.33 KG/M2 | OXYGEN SATURATION: 98 % | WEIGHT: 159.39 LBS | DIASTOLIC BLOOD PRESSURE: 76 MMHG | SYSTOLIC BLOOD PRESSURE: 168 MMHG | TEMPERATURE: 97.6 F | RESPIRATION RATE: 18 BRPM | HEIGHT: 62 IN | HEART RATE: 109 BPM

## 2019-01-25 PROCEDURE — 96413 CHEMO IV INFUSION 1 HR: CPT

## 2019-01-25 PROCEDURE — 96375 TX/PRO/DX INJ NEW DRUG ADDON: CPT

## 2019-01-25 PROCEDURE — 96417 CHEMO IV INFUS EACH ADDL SEQ: CPT

## 2019-01-25 PROCEDURE — 96415 CHEMO IV INFUSION ADDL HR: CPT

## 2019-01-25 PROCEDURE — 96367 TX/PROPH/DG ADDL SEQ IV INF: CPT

## 2019-01-25 RX ADMIN — FAMOTIDINE 20 MG: 10 INJECTION, SOLUTION INTRAVENOUS at 09:04

## 2019-01-25 RX ADMIN — CARBOPLATIN 382 MG: 10 INJECTION, SOLUTION INTRAVENOUS at 14:17

## 2019-01-25 RX ADMIN — PALONOSETRON 0.25 MG: 0.05 INJECTION, SOLUTION INTRAVENOUS at 10:26

## 2019-01-25 RX ADMIN — SODIUM CHLORIDE 20 ML/HR: 9 INJECTION, SOLUTION INTRAVENOUS at 08:27

## 2019-01-25 RX ADMIN — DIPHENHYDRAMINE HYDROCHLORIDE 25 MG: 50 INJECTION, SOLUTION INTRAMUSCULAR; INTRAVENOUS at 10:01

## 2019-01-25 RX ADMIN — SODIUM CHLORIDE 150 MG: 0.9 INJECTION, SOLUTION INTRAVENOUS at 09:25

## 2019-01-25 RX ADMIN — DEXAMETHASONE SODIUM PHOSPHATE 20 MG: 10 INJECTION, SOLUTION INTRAMUSCULAR; INTRAVENOUS at 08:36

## 2019-01-25 RX ADMIN — PACLITAXEL 235 MG: 6 INJECTION, SOLUTION INTRAVENOUS at 10:31

## 2019-01-29 ENCOUNTER — APPOINTMENT (OUTPATIENT)
Dept: LAB | Facility: HOSPITAL | Age: 79
End: 2019-01-29
Payer: MEDICARE

## 2019-01-29 DIAGNOSIS — C56.9 MALIGNANT NEOPLASM OF OVARY, UNSPECIFIED LATERALITY (HCC): Primary | ICD-10-CM

## 2019-01-29 DIAGNOSIS — C56.9 MALIGNANT NEOPLASM OF OVARY, UNSPECIFIED LATERALITY (HCC): ICD-10-CM

## 2019-01-29 LAB
ALBUMIN SERPL BCP-MCNC: 3.3 G/DL (ref 3.5–5)
ALP SERPL-CCNC: 138 U/L (ref 46–116)
ALT SERPL W P-5'-P-CCNC: 14 U/L (ref 12–78)
ANION GAP SERPL CALCULATED.3IONS-SCNC: 12 MMOL/L (ref 4–13)
AST SERPL W P-5'-P-CCNC: 19 U/L (ref 5–45)
BASOPHILS # BLD AUTO: 0.02 THOUSANDS/ΜL (ref 0–0.1)
BASOPHILS NFR BLD AUTO: 0 % (ref 0–1)
BILIRUB SERPL-MCNC: 0.2 MG/DL (ref 0.2–1)
BUN SERPL-MCNC: 18 MG/DL (ref 5–25)
CALCIUM SERPL-MCNC: 9.2 MG/DL (ref 8.3–10.1)
CHLORIDE SERPL-SCNC: 99 MMOL/L (ref 100–108)
CO2 SERPL-SCNC: 28 MMOL/L (ref 21–32)
CREAT SERPL-MCNC: 0.74 MG/DL (ref 0.6–1.3)
EOSINOPHIL # BLD AUTO: 0.03 THOUSAND/ΜL (ref 0–0.61)
EOSINOPHIL NFR BLD AUTO: 1 % (ref 0–6)
ERYTHROCYTE [DISTWIDTH] IN BLOOD BY AUTOMATED COUNT: 14.6 % (ref 11.6–15.1)
GFR SERPL CREATININE-BSD FRML MDRD: 77 ML/MIN/1.73SQ M
GLUCOSE P FAST SERPL-MCNC: 112 MG/DL (ref 65–99)
HCT VFR BLD AUTO: 36.6 % (ref 34.8–46.1)
HGB BLD-MCNC: 11 G/DL (ref 11.5–15.4)
IMM GRANULOCYTES # BLD AUTO: 0.01 THOUSAND/UL (ref 0–0.2)
IMM GRANULOCYTES NFR BLD AUTO: 0 % (ref 0–2)
LYMPHOCYTES # BLD AUTO: 1.43 THOUSANDS/ΜL (ref 0.6–4.47)
LYMPHOCYTES NFR BLD AUTO: 32 % (ref 14–44)
MAGNESIUM SERPL-MCNC: 1.5 MG/DL (ref 1.6–2.6)
MCH RBC QN AUTO: 26.3 PG (ref 26.8–34.3)
MCHC RBC AUTO-ENTMCNC: 30.1 G/DL (ref 31.4–37.4)
MCV RBC AUTO: 88 FL (ref 82–98)
MONOCYTES # BLD AUTO: 0.09 THOUSAND/ΜL (ref 0.17–1.22)
MONOCYTES NFR BLD AUTO: 2 % (ref 4–12)
NEUTROPHILS # BLD AUTO: 2.89 THOUSANDS/ΜL (ref 1.85–7.62)
NEUTS SEG NFR BLD AUTO: 65 % (ref 43–75)
NRBC BLD AUTO-RTO: 0 /100 WBCS
PLATELET # BLD AUTO: 240 THOUSANDS/UL (ref 149–390)
PMV BLD AUTO: 9.4 FL (ref 8.9–12.7)
POTASSIUM SERPL-SCNC: 3.7 MMOL/L (ref 3.5–5.3)
PROT SERPL-MCNC: 7.7 G/DL (ref 6.4–8.2)
RBC # BLD AUTO: 4.18 MILLION/UL (ref 3.81–5.12)
SODIUM SERPL-SCNC: 139 MMOL/L (ref 136–145)
WBC # BLD AUTO: 4.47 THOUSAND/UL (ref 4.31–10.16)

## 2019-01-29 PROCEDURE — 85025 COMPLETE CBC W/AUTO DIFF WBC: CPT | Performed by: PHYSICIAN ASSISTANT

## 2019-01-29 PROCEDURE — 83735 ASSAY OF MAGNESIUM: CPT

## 2019-01-29 PROCEDURE — 36415 COLL VENOUS BLD VENIPUNCTURE: CPT | Performed by: PHYSICIAN ASSISTANT

## 2019-01-29 PROCEDURE — 80053 COMPREHEN METABOLIC PANEL: CPT | Performed by: PHYSICIAN ASSISTANT

## 2019-01-30 ENCOUNTER — HOSPITAL ENCOUNTER (OUTPATIENT)
Dept: RADIOLOGY | Facility: HOSPITAL | Age: 79
Discharge: HOME | End: 2019-01-30
Attending: INTERNAL MEDICINE | Admitting: OBSTETRICS & GYNECOLOGY
Payer: MEDICARE

## 2019-01-30 VITALS
BODY MASS INDEX: 29.26 KG/M2 | RESPIRATION RATE: 16 BRPM | TEMPERATURE: 97.4 F | WEIGHT: 159 LBS | HEIGHT: 62 IN | DIASTOLIC BLOOD PRESSURE: 55 MMHG | HEART RATE: 89 BPM | OXYGEN SATURATION: 97 % | SYSTOLIC BLOOD PRESSURE: 118 MMHG

## 2019-01-30 LAB
INR PPP: 1.22 (ref 0.86–1.17)
PROTHROMBIN TIME: 15.5 SECONDS (ref 11.8–14.2)

## 2019-01-30 PROCEDURE — 85610 PROTHROMBIN TIME: CPT | Performed by: RADIOLOGY

## 2019-01-30 PROCEDURE — 99153 MOD SED SAME PHYS/QHP EA: CPT

## 2019-01-30 PROCEDURE — 37193 REM ENDOVAS VENA CAVA FILTER: CPT | Performed by: RADIOLOGY

## 2019-01-30 PROCEDURE — C1894 INTRO/SHEATH, NON-LASER: HCPCS

## 2019-01-30 PROCEDURE — C1769 GUIDE WIRE: HCPCS

## 2019-01-30 PROCEDURE — 37193 REM ENDOVAS VENA CAVA FILTER: CPT

## 2019-01-30 PROCEDURE — 99152 MOD SED SAME PHYS/QHP 5/>YRS: CPT

## 2019-01-30 PROCEDURE — 99152 MOD SED SAME PHYS/QHP 5/>YRS: CPT | Performed by: RADIOLOGY

## 2019-01-30 PROCEDURE — C1773 RET DEV, INSERTABLE: HCPCS

## 2019-01-30 RX ORDER — MIDAZOLAM HYDROCHLORIDE 1 MG/ML
INJECTION INTRAMUSCULAR; INTRAVENOUS CODE/TRAUMA/SEDATION MEDICATION
Status: COMPLETED | OUTPATIENT
Start: 2019-01-30 | End: 2019-01-30

## 2019-01-30 RX ORDER — FENTANYL CITRATE 50 UG/ML
INJECTION, SOLUTION INTRAMUSCULAR; INTRAVENOUS CODE/TRAUMA/SEDATION MEDICATION
Status: COMPLETED | OUTPATIENT
Start: 2019-01-30 | End: 2019-01-30

## 2019-01-30 RX ORDER — HYDROCODONE BITARTRATE AND ACETAMINOPHEN 5; 325 MG/1; MG/1
1 TABLET ORAL EVERY 6 HOURS PRN
Status: DISCONTINUED | OUTPATIENT
Start: 2019-01-30 | End: 2019-01-30 | Stop reason: HOSPADM

## 2019-01-30 RX ORDER — SODIUM CHLORIDE 9 MG/ML
75 INJECTION, SOLUTION INTRAVENOUS CONTINUOUS
Status: DISCONTINUED | OUTPATIENT
Start: 2019-01-30 | End: 2019-01-30 | Stop reason: HOSPADM

## 2019-01-30 RX ADMIN — MIDAZOLAM 1 MG: 1 INJECTION INTRAMUSCULAR; INTRAVENOUS at 12:41

## 2019-01-30 RX ADMIN — IOHEXOL 15 ML: 300 INJECTION, SOLUTION INTRAVENOUS at 13:03

## 2019-01-30 RX ADMIN — SODIUM CHLORIDE 75 ML/HR: 0.9 INJECTION, SOLUTION INTRAVENOUS at 09:59

## 2019-01-30 RX ADMIN — MIDAZOLAM 1 MG: 1 INJECTION INTRAMUSCULAR; INTRAVENOUS at 12:19

## 2019-01-30 RX ADMIN — FENTANYL CITRATE 50 MCG: 50 INJECTION, SOLUTION INTRAMUSCULAR; INTRAVENOUS at 12:19

## 2019-01-30 RX ADMIN — FENTANYL CITRATE 50 MCG: 50 INJECTION, SOLUTION INTRAMUSCULAR; INTRAVENOUS at 12:41

## 2019-01-30 NOTE — BRIEF OP NOTE (RAD/CATH)
IR IVC FILTER REMOVAL  Procedure Note    PATIENT NAME: Gilberto Leslie  : 1940  MRN: [id number]     Pre-op Diagnosis: No diagnosis found  Post-op Diagnosis: No diagnosis found      Surgeon:   Nakul Lo MD  Assistants:     No qualified resident was available, Resident is only observing    Estimated Blood Loss: minimal  Findings: Successful IVC filter removal    Specimens: filter    Complications:  None immediate    Anesthesia: Conscious sedation    Nakul Lo MD     Date: 2019  Time: 1:33 PM

## 2019-01-30 NOTE — DISCHARGE INSTRUCTIONS
Inferior Vena Cava Filter Removal   WHAT YOU NEED TO KNOW:   Inferior vena cava (IVC) filter removal is a procedure to remove your IVC filter  DISCHARGE INSTRUCTIONS:   Medicines:   · Antibiotics: This medicine is given to fight or prevent an infection caused by bacteria  Always take your antibiotics exactly as ordered by your healthcare provider  Do not stop taking your medicine unless directed by your healthcare provider  Never save antibiotics or take leftover antibiotics that were given to you for another illness  · Pain medicine: You may be given medicine to take away or decrease pain  Do not wait until the pain is severe before you take your medicine  · Take your medicine as directed  Contact your healthcare provider if you think your medicine is not helping or if you have side effects  Tell him or her if you are allergic to any medicine  Keep a list of the medicines, vitamins, and herbs you take  Include the amounts, and when and why you take them  Bring the list or the pill bottles to follow-up visits  Carry your medicine list with you in case of an emergency  Follow up with your healthcare provider as directed: You will need to return to have your wound checked  Write down your questions so you remember to ask them during your visits  Wound care:  Keep your wound clean and dry  When you are allowed to bathe, carefully wash the wound with soap and water  Dry the area and put on new, clean bandages as directed  Change your bandages when they get wet or dirty  Self-care:   · Limit activity:  Do not lift, pull, or push until your healthcare provider says it is okay  Slowly start to do more each day  Return to your daily activities as directed  · Eat a variety of healthy foods:  Healthy foods include fruits, vegetables, whole-grain breads, low-fat dairy products, beans, lean meats, and fish  Ask if you need to be on a special diet      · Drink liquids as directed:  Ask your healthcare provider how much liquid to drink each day and which liquids are best for you  · Do not smoke: If you smoke, it is never too late to quit  Ask for information about how to stop smoking if you need help  Contact your healthcare provider if:   · You have a fever  · You have chills, a cough, or feel weak and achy  · Your wound is red, swollen, or draining pus  · You have questions or concerns about your condition or care  Seek care immediately or call 911 if:   · Blood soaks through your bandage  · Your incision has come apart  · You feel like you are going to faint  · Your nails or lips look blue  · Your arm or leg feels warm, tender, and painful  It may look swollen and red  · You feel lightheaded and short of breath  · You have chest pain when you take a deep breath or cough, or you cough up blood  © 2017 [employer] Information is for End User's use only and may not be sold, redistributed or otherwise used for commercial purposes  All illustrations and images included in CareNotes® are the copyrighted property of A [employer] M [employer]  or [employer]  The above information is an  only  It is not intended as medical advice for individual conditions or treatments  Talk to your doctor, nurse or pharmacist before following any medical regimen to see if it is safe and effective for you

## 2019-01-30 NOTE — SEDATION DOCUMENTATION
IVC filter retrieved without complications  VSS  Denies pain  Steri strip to right IJ vein puncture site

## 2019-01-30 NOTE — H&P (VIEW-ONLY)
Assessment/Plan:    Problem List Items Addressed This Visit        Nervous and Auditory    Chemotherapy-induced neuropathy (HCC)     Grade 1, fingertips  Intermittent, not affecting ADLs  Taxol dose-reduce to 135 mg/m2  Continue to monitor  Genitourinary    Malignant neoplasm of both ovaries ([facility] [address] ) - Primary     Stage IIIC s/p neoadjuvant chemotherapy and cytoreduction surgery to no gross residual  She is currently receiving adjuvant chemotherapy with taxol 175 mg/m2 and carboplatin AUC 5  She received 2 cycles in the adjuvant setting  Overall, she is tolerating treatment well with the exception of chemotherapy-induced neutropenia and grade 1 peripheral neuropathy       Continue with cycle 3 of adjuvant therapy  Metabolic and hematologic parameters reviewed  Plan to dose-reduce taxol to 135 mg/m2 due to grade 1 peripheral neuropathy and khushi ANC 0 33, with prior allergic reaction to granix       Plan for CT imaging after completion of cycle 6  Return to the office in 4 weeks for post-treatment evaluation  Relevant Orders    CT chest abdomen pelvis w contrast       Other    Chemotherapy induced neutropenia (HCC)     Khushi ANC 0 33  Plan to dose reduce taxol to 135 mg/m2  Carboplatin previously dose-reduced to AUC 5  Patient with previous allergic reaction to granix  Monitor closely  CHIEF COMPLAINT:   Pre-chemotherapy evaluation    Problem:  Cancer Staging  Malignant neoplasm of both ovaries ([facility] [address] )  Staging form: Ovary, Fallopian Tube, Primary Peritoneal, AJCC 8th Edition  - Clinical: ycT3c - Signed by Ja Sharma MD on 12/6/2018        Previous therapy:     Malignant neoplasm of both ovaries ([facility] [address] )    8/16/2018 Biopsy     Paracentesis, metastatic adenocarcinoma, consistent with gyn primary          Chemotherapy     Neoadjuvant chemotherapy with taxol 80 mg/m2 weekly and carboplatin AUC 6 every 21 days   She received 3          11/23/2018 Surgery     Diagnostic laparoscopy, exploratory laparotomy, modified radical hysterectomy, bilateral salpingo-oophorectomy, rectosigmoid resection with end-to-end reanastomosis, gastrocolic omentectomy, peritoneal resection  Residual tumor identified in the ovaries  Chemotherapy     Adjuvant chemotherapy, taxol 175 mg/m2 and carboplatin AUC 5 every 21 days  She received 2 cycles (total cycle 5) and is scheduled for cycle 3  Patient ID: Chin Joshi is a 77 y o  female  who presents to the office for pre-chemotherapy evaluation  She has been afebrile  The patient is tolerating treatment well  No abdominal pain/nausea/vomiting  She notes fatigue following treatment  Her appetite is appropriate  Normal bowel/bladder function  Patient notes intermittent neuropathy in her fingertips over the past few weeks  She denies interference with her ADLs  The following portions of the patient's history were reviewed and updated as appropriate: allergies, current medications, past medical history and problem list     Review of Systems   Constitutional: Positive for fatigue  Negative for fever  HENT: Negative  Eyes: Negative  Respiratory: Negative  Cardiovascular: Negative  Gastrointestinal: Negative  Genitourinary: Negative  Musculoskeletal: Negative  Skin: Negative  Neurological: Positive for numbness (intermittent, fingertips)  Psychiatric/Behavioral: Negative  Current Outpatient Prescriptions   Medication Sig Dispense Refill    docusate sodium (COLACE) 100 mg capsule Take 1 capsule (100 mg total) by mouth 2 (two) times a day 10 capsule 0    polyethylene glycol (MIRALAX) 17 g packet Take 17 g by mouth daily 14 each 0    XARELTO 20 MG tablet TAKE 1 TABLET BY MOUTH ONCE DAILY WITH BREAKFAST 30 tablet 3     No current facility-administered medications for this visit  Objective:    Blood pressure 162/84, pulse 86, temperature 98 3 °F (36 8 °C), temperature source Oral, resp   rate (!) 23, height 5' 2" (1 575 m), weight 72 3 kg (159 lb 8 oz)  Body mass index is 29 17 kg/m²  Body surface area is 1 74 meters squared  Physical Exam   Constitutional: She is oriented to person, place, and time  She appears well-developed and well-nourished  Pulmonary/Chest: Effort normal    Neurological: She is alert and oriented to person, place, and time  Skin: Skin is warm and dry  No rash noted  Psychiatric: She has a normal mood and affect  Her behavior is normal    Performance status is zero       Lab Results   Component Value Date     13 6 01/18/2019     Lab Results   Component Value Date    K 3 7 01/23/2019     01/23/2019    CO2 29 01/23/2019    BUN 17 01/23/2019    CREATININE 0 83 01/23/2019    GLUF 110 (H) 01/21/2019    CALCIUM 9 3 01/23/2019    AST 15 01/23/2019    ALT 13 01/23/2019    ALKPHOS 145 (H) 01/23/2019    EGFR 67 01/23/2019     Lab Results   Component Value Date    WBC 8 04 01/23/2019    HGB 10 7 (L) 01/23/2019    HCT 35 6 01/23/2019    MCV 87 01/23/2019     01/23/2019     Lab Results   Component Value Date    NEUTROABS 4 81 01/23/2019

## 2019-01-30 NOTE — INTERVAL H&P NOTE
Update: (This section must be completed if the H&P was completed greater than 24 hrs to procedure or admission)    H&P reviewed  After examining the patient, I find no changed to the H&P since it had been written  Patient re-evaluated   Accept as history and physical     Geovanni Rose MD/January 30, 2019/1:33 PM

## 2019-02-06 ENCOUNTER — APPOINTMENT (OUTPATIENT)
Dept: LAB | Facility: HOSPITAL | Age: 79
End: 2019-02-06
Payer: MEDICARE

## 2019-02-06 DIAGNOSIS — C56.9 MALIGNANT NEOPLASM OF OVARY, UNSPECIFIED LATERALITY (HCC): ICD-10-CM

## 2019-02-06 LAB
ALBUMIN SERPL BCP-MCNC: 3.1 G/DL (ref 3.5–5)
ALP SERPL-CCNC: 141 U/L (ref 46–116)
ALT SERPL W P-5'-P-CCNC: <6 U/L (ref 12–78)
ANION GAP SERPL CALCULATED.3IONS-SCNC: 9 MMOL/L (ref 4–13)
AST SERPL W P-5'-P-CCNC: <5 U/L (ref 5–45)
BASOPHILS # BLD AUTO: 0.02 THOUSANDS/ΜL (ref 0–0.1)
BASOPHILS NFR BLD AUTO: 1 % (ref 0–1)
BILIRUB SERPL-MCNC: 0.1 MG/DL (ref 0.2–1)
BUN SERPL-MCNC: 17 MG/DL (ref 5–25)
CALCIUM SERPL-MCNC: 9 MG/DL (ref 8.3–10.1)
CHLORIDE SERPL-SCNC: 104 MMOL/L (ref 100–108)
CO2 SERPL-SCNC: 29 MMOL/L (ref 21–32)
CREAT SERPL-MCNC: 0.71 MG/DL (ref 0.6–1.3)
EOSINOPHIL # BLD AUTO: 0.1 THOUSAND/ΜL (ref 0–0.61)
EOSINOPHIL NFR BLD AUTO: 3 % (ref 0–6)
ERYTHROCYTE [DISTWIDTH] IN BLOOD BY AUTOMATED COUNT: 15.4 % (ref 11.6–15.1)
GFR SERPL CREATININE-BSD FRML MDRD: 81 ML/MIN/1.73SQ M
GLUCOSE P FAST SERPL-MCNC: 93 MG/DL (ref 65–99)
HCT VFR BLD AUTO: 33.3 % (ref 34.8–46.1)
HGB BLD-MCNC: 10.2 G/DL (ref 11.5–15.4)
IMM GRANULOCYTES # BLD AUTO: 0.01 THOUSAND/UL (ref 0–0.2)
IMM GRANULOCYTES NFR BLD AUTO: 0 % (ref 0–2)
LYMPHOCYTES # BLD AUTO: 1.59 THOUSANDS/ΜL (ref 0.6–4.47)
LYMPHOCYTES NFR BLD AUTO: 53 % (ref 14–44)
MAGNESIUM SERPL-MCNC: 1.6 MG/DL (ref 1.6–2.6)
MCH RBC QN AUTO: 26.6 PG (ref 26.8–34.3)
MCHC RBC AUTO-ENTMCNC: 30.6 G/DL (ref 31.4–37.4)
MCV RBC AUTO: 87 FL (ref 82–98)
MONOCYTES # BLD AUTO: 0.51 THOUSAND/ΜL (ref 0.17–1.22)
MONOCYTES NFR BLD AUTO: 17 % (ref 4–12)
NEUTROPHILS # BLD AUTO: 0.79 THOUSANDS/ΜL (ref 1.85–7.62)
NEUTS SEG NFR BLD AUTO: 26 % (ref 43–75)
NRBC BLD AUTO-RTO: 0 /100 WBCS
PLATELET # BLD AUTO: 264 THOUSANDS/UL (ref 149–390)
PMV BLD AUTO: 9.5 FL (ref 8.9–12.7)
POTASSIUM SERPL-SCNC: 3.8 MMOL/L (ref 3.5–5.3)
PROT SERPL-MCNC: 7 G/DL (ref 6.4–8.2)
RBC # BLD AUTO: 3.84 MILLION/UL (ref 3.81–5.12)
SODIUM SERPL-SCNC: 142 MMOL/L (ref 136–145)
WBC # BLD AUTO: 3.02 THOUSAND/UL (ref 4.31–10.16)

## 2019-02-06 PROCEDURE — 85025 COMPLETE CBC W/AUTO DIFF WBC: CPT

## 2019-02-06 PROCEDURE — 83735 ASSAY OF MAGNESIUM: CPT

## 2019-02-06 PROCEDURE — 36415 COLL VENOUS BLD VENIPUNCTURE: CPT

## 2019-02-06 PROCEDURE — 80053 COMPREHEN METABOLIC PANEL: CPT

## 2019-02-06 RX ORDER — SODIUM CHLORIDE 9 MG/ML
50 INJECTION, SOLUTION INTRAVENOUS ONCE
Status: CANCELLED | OUTPATIENT
Start: 2019-03-02 | End: 2019-02-06

## 2019-02-13 ENCOUNTER — APPOINTMENT (OUTPATIENT)
Dept: LAB | Facility: HOSPITAL | Age: 79
End: 2019-02-13
Payer: MEDICARE

## 2019-02-13 ENCOUNTER — TRANSCRIBE ORDERS (OUTPATIENT)
Dept: ADMINISTRATIVE | Facility: HOSPITAL | Age: 79
End: 2019-02-13

## 2019-02-13 DIAGNOSIS — C56.9 MALIGNANT NEOPLASM OF OVARY, UNSPECIFIED LATERALITY (HCC): Primary | ICD-10-CM

## 2019-02-13 DIAGNOSIS — C56.9 MALIGNANT NEOPLASM OF OVARY, UNSPECIFIED LATERALITY (HCC): ICD-10-CM

## 2019-02-13 LAB
ALBUMIN SERPL BCP-MCNC: 3.2 G/DL (ref 3.5–5)
ALP SERPL-CCNC: 152 U/L (ref 46–116)
ALT SERPL W P-5'-P-CCNC: 12 U/L (ref 12–78)
ANION GAP SERPL CALCULATED.3IONS-SCNC: 9 MMOL/L (ref 4–13)
AST SERPL W P-5'-P-CCNC: 13 U/L (ref 5–45)
BASOPHILS # BLD AUTO: 0.03 THOUSANDS/ΜL (ref 0–0.1)
BASOPHILS NFR BLD AUTO: 1 % (ref 0–1)
BILIRUB SERPL-MCNC: 0.3 MG/DL (ref 0.2–1)
BUN SERPL-MCNC: 19 MG/DL (ref 5–25)
CALCIUM SERPL-MCNC: 9.3 MG/DL (ref 8.3–10.1)
CANCER AG125 SERPL-ACNC: 11.2 U/ML (ref 0–30)
CHLORIDE SERPL-SCNC: 101 MMOL/L (ref 100–108)
CO2 SERPL-SCNC: 28 MMOL/L (ref 21–32)
CREAT SERPL-MCNC: 0.76 MG/DL (ref 0.6–1.3)
EOSINOPHIL # BLD AUTO: 0.03 THOUSAND/ΜL (ref 0–0.61)
EOSINOPHIL NFR BLD AUTO: 1 % (ref 0–6)
ERYTHROCYTE [DISTWIDTH] IN BLOOD BY AUTOMATED COUNT: 15.9 % (ref 11.6–15.1)
GFR SERPL CREATININE-BSD FRML MDRD: 75 ML/MIN/1.73SQ M
GLUCOSE SERPL-MCNC: 108 MG/DL (ref 65–140)
HCT VFR BLD AUTO: 36.4 % (ref 34.8–46.1)
HGB BLD-MCNC: 11 G/DL (ref 11.5–15.4)
IMM GRANULOCYTES # BLD AUTO: 0.02 THOUSAND/UL (ref 0–0.2)
IMM GRANULOCYTES NFR BLD AUTO: 0 % (ref 0–2)
LYMPHOCYTES # BLD AUTO: 1.53 THOUSANDS/ΜL (ref 0.6–4.47)
LYMPHOCYTES NFR BLD AUTO: 25 % (ref 14–44)
MAGNESIUM SERPL-MCNC: 1.7 MG/DL (ref 1.6–2.6)
MCH RBC QN AUTO: 26 PG (ref 26.8–34.3)
MCHC RBC AUTO-ENTMCNC: 30.2 G/DL (ref 31.4–37.4)
MCV RBC AUTO: 86 FL (ref 82–98)
MONOCYTES # BLD AUTO: 1.23 THOUSAND/ΜL (ref 0.17–1.22)
MONOCYTES NFR BLD AUTO: 20 % (ref 4–12)
NEUTROPHILS # BLD AUTO: 3.41 THOUSANDS/ΜL (ref 1.85–7.62)
NEUTS SEG NFR BLD AUTO: 53 % (ref 43–75)
NRBC BLD AUTO-RTO: 0 /100 WBCS
PLATELET # BLD AUTO: 262 THOUSANDS/UL (ref 149–390)
PMV BLD AUTO: 9.3 FL (ref 8.9–12.7)
POTASSIUM SERPL-SCNC: 3.7 MMOL/L (ref 3.5–5.3)
PROT SERPL-MCNC: 7.6 G/DL (ref 6.4–8.2)
RBC # BLD AUTO: 4.23 MILLION/UL (ref 3.81–5.12)
SODIUM SERPL-SCNC: 138 MMOL/L (ref 136–145)
WBC # BLD AUTO: 6.25 THOUSAND/UL (ref 4.31–10.16)

## 2019-02-13 PROCEDURE — 86304 IMMUNOASSAY TUMOR CA 125: CPT

## 2019-02-13 PROCEDURE — 83735 ASSAY OF MAGNESIUM: CPT

## 2019-02-13 PROCEDURE — 36415 COLL VENOUS BLD VENIPUNCTURE: CPT

## 2019-02-13 PROCEDURE — 80053 COMPREHEN METABOLIC PANEL: CPT

## 2019-02-13 PROCEDURE — 85025 COMPLETE CBC W/AUTO DIFF WBC: CPT

## 2019-02-15 ENCOUNTER — HOSPITAL ENCOUNTER (OUTPATIENT)
Dept: RADIOLOGY | Facility: HOSPITAL | Age: 79
Discharge: HOME | End: 2019-02-15
Payer: MEDICARE

## 2019-02-15 DIAGNOSIS — C56.3 MALIGNANT NEOPLASM OF BOTH OVARIES (HCC): ICD-10-CM

## 2019-02-15 PROCEDURE — 71260 CT THORAX DX C+: CPT

## 2019-02-15 PROCEDURE — 74177 CT ABD & PELVIS W/CONTRAST: CPT

## 2019-02-15 RX ADMIN — IOHEXOL 100 ML: 350 INJECTION, SOLUTION INTRAVENOUS at 10:00

## 2019-02-19 ENCOUNTER — TRANSCRIBE ORDERS (OUTPATIENT)
Dept: ADMINISTRATIVE | Facility: HOSPITAL | Age: 79
End: 2019-02-19

## 2019-02-19 ENCOUNTER — APPOINTMENT (OUTPATIENT)
Dept: LAB | Facility: HOSPITAL | Age: 79
End: 2019-02-19
Payer: MEDICARE

## 2019-02-19 DIAGNOSIS — C56.9 MALIGNANT NEOPLASM OF OVARY, UNSPECIFIED LATERALITY (HCC): ICD-10-CM

## 2019-02-19 DIAGNOSIS — C56.3 MALIGNANT NEOPLASM OF BOTH OVARIES (HCC): ICD-10-CM

## 2019-02-19 DIAGNOSIS — C56.3 MALIGNANT NEOPLASM OF BOTH OVARIES (HCC): Primary | ICD-10-CM

## 2019-02-19 LAB
ALBUMIN SERPL BCP-MCNC: 3.2 G/DL (ref 3.5–5)
ALP SERPL-CCNC: 151 U/L (ref 46–116)
ALT SERPL W P-5'-P-CCNC: 12 U/L (ref 12–78)
ANION GAP SERPL CALCULATED.3IONS-SCNC: 8 MMOL/L (ref 4–13)
AST SERPL W P-5'-P-CCNC: 13 U/L (ref 5–45)
BASOPHILS # BLD AUTO: 0.05 THOUSANDS/ΜL (ref 0–0.1)
BASOPHILS NFR BLD AUTO: 1 % (ref 0–1)
BILIRUB SERPL-MCNC: 0.2 MG/DL (ref 0.2–1)
BUN SERPL-MCNC: 21 MG/DL (ref 5–25)
CALCIUM SERPL-MCNC: 9.2 MG/DL (ref 8.3–10.1)
CANCER AG125 SERPL-ACNC: 10.3 U/ML (ref 0–30)
CHLORIDE SERPL-SCNC: 103 MMOL/L (ref 100–108)
CO2 SERPL-SCNC: 30 MMOL/L (ref 21–32)
CREAT SERPL-MCNC: 0.91 MG/DL (ref 0.6–1.3)
EOSINOPHIL # BLD AUTO: 0.07 THOUSAND/ΜL (ref 0–0.61)
EOSINOPHIL NFR BLD AUTO: 1 % (ref 0–6)
ERYTHROCYTE [DISTWIDTH] IN BLOOD BY AUTOMATED COUNT: 15.3 % (ref 11.6–15.1)
GFR SERPL CREATININE-BSD FRML MDRD: 60 ML/MIN/1.73SQ M
GLUCOSE SERPL-MCNC: 95 MG/DL (ref 65–140)
HCT VFR BLD AUTO: 37.9 % (ref 34.8–46.1)
HGB BLD-MCNC: 11.3 G/DL (ref 11.5–15.4)
IMM GRANULOCYTES # BLD AUTO: 0.03 THOUSAND/UL (ref 0–0.2)
IMM GRANULOCYTES NFR BLD AUTO: 0 % (ref 0–2)
LYMPHOCYTES # BLD AUTO: 2.16 THOUSANDS/ΜL (ref 0.6–4.47)
LYMPHOCYTES NFR BLD AUTO: 28 % (ref 14–44)
MAGNESIUM SERPL-MCNC: 1.8 MG/DL (ref 1.6–2.6)
MCH RBC QN AUTO: 25.7 PG (ref 26.8–34.3)
MCHC RBC AUTO-ENTMCNC: 29.8 G/DL (ref 31.4–37.4)
MCV RBC AUTO: 86 FL (ref 82–98)
MONOCYTES # BLD AUTO: 0.65 THOUSAND/ΜL (ref 0.17–1.22)
MONOCYTES NFR BLD AUTO: 9 % (ref 4–12)
NEUTROPHILS # BLD AUTO: 4.72 THOUSANDS/ΜL (ref 1.85–7.62)
NEUTS SEG NFR BLD AUTO: 61 % (ref 43–75)
NRBC BLD AUTO-RTO: 0 /100 WBCS
PLATELET # BLD AUTO: 287 THOUSANDS/UL (ref 149–390)
PMV BLD AUTO: 9.2 FL (ref 8.9–12.7)
POTASSIUM SERPL-SCNC: 4 MMOL/L (ref 3.5–5.3)
PROT SERPL-MCNC: 7.6 G/DL (ref 6.4–8.2)
RBC # BLD AUTO: 4.4 MILLION/UL (ref 3.81–5.12)
SODIUM SERPL-SCNC: 141 MMOL/L (ref 136–145)
WBC # BLD AUTO: 7.68 THOUSAND/UL (ref 4.31–10.16)

## 2019-02-19 PROCEDURE — 86304 IMMUNOASSAY TUMOR CA 125: CPT

## 2019-02-19 PROCEDURE — 83735 ASSAY OF MAGNESIUM: CPT

## 2019-02-19 PROCEDURE — 85025 COMPLETE CBC W/AUTO DIFF WBC: CPT | Performed by: PHYSICIAN ASSISTANT

## 2019-02-19 PROCEDURE — 36415 COLL VENOUS BLD VENIPUNCTURE: CPT | Performed by: PHYSICIAN ASSISTANT

## 2019-02-19 PROCEDURE — 80053 COMPREHEN METABOLIC PANEL: CPT | Performed by: PHYSICIAN ASSISTANT

## 2019-02-21 ENCOUNTER — OFFICE VISIT (OUTPATIENT)
Dept: GYNECOLOGIC ONCOLOGY | Facility: CLINIC | Age: 79
End: 2019-02-21
Payer: MEDICARE

## 2019-02-21 VITALS
HEIGHT: 62 IN | BODY MASS INDEX: 29.19 KG/M2 | DIASTOLIC BLOOD PRESSURE: 80 MMHG | HEART RATE: 100 BPM | TEMPERATURE: 98.3 F | SYSTOLIC BLOOD PRESSURE: 160 MMHG | WEIGHT: 158.6 LBS

## 2019-02-21 DIAGNOSIS — I82.4Z2 DVT, LOWER EXTREMITY, DISTAL, ACUTE, LEFT (HCC): ICD-10-CM

## 2019-02-21 DIAGNOSIS — R92.8 ABNORMAL MAMMOGRAM OF RIGHT BREAST: Primary | ICD-10-CM

## 2019-02-21 DIAGNOSIS — C56.3 MALIGNANT NEOPLASM OF BOTH OVARIES (HCC): ICD-10-CM

## 2019-02-21 PROCEDURE — 99214 OFFICE O/P EST MOD 30 MIN: CPT | Performed by: OBSTETRICS & GYNECOLOGY

## 2019-02-21 NOTE — ASSESSMENT & PLAN NOTE
Abnormal mammogram of the right breast with CT finding of nodularity in the right upper outer quadrant    Plan for diagnostic mammogram

## 2019-02-21 NOTE — ASSESSMENT & PLAN NOTE
70-year-old with stage IIIC ovarian cancer who has completed surgery and chemotherapy and is clinically and radiologically without evidence of disease  She does not carry a germline mutation in BRCA  Her performance status is 0   1  Will send molecular testing on her tumor to assess for somatic BRCA mutation  If she has a mutation, she will be a candidate for PARP inhibitor therapy  2  I discussed the role of maintenance therapy in the absence of a germline or somatic BRCA mutation  She understands that although there is a progression free survival advantage, to date, there has been no overall survival advantage    3  IR consultation to remove her intravenous port  4   prior to her next visit in 3 months

## 2019-02-21 NOTE — PROGRESS NOTES
Assessment/Plan:    Problem List Items Addressed This Visit        Cardiovascular and Mediastinum    DVT, lower extremity, distal, acute, left ([address] )     IVC filter removed, currently on Xarelto  The Xarelto will continue for at least 6 months while she is GILBERT  Genitourinary    Malignant neoplasm of both ovaries [facility])     76-year-old with stage IIIC ovarian cancer who has completed surgery and chemotherapy and is clinically and radiologically without evidence of disease  She does not carry a germline mutation in BRCA  Her performance status is 0   1  Will send molecular testing on her tumor to assess for somatic BRCA mutation  If she has a mutation, she will be a candidate for PARP inhibitor therapy  2  I discussed the role of maintenance therapy in the absence of a germline or somatic BRCA mutation  She understands that although there is a progression free survival advantage, to date, there has been no overall survival advantage  3  IR consultation to remove her intravenous port  4   prior to her next visit in 3 months             Relevant Orders    IR port Removal           Other    Abnormal mammogram of right breast - Primary     Abnormal mammogram of the right breast with CT finding of nodularity in the right upper outer quadrant  Plan for diagnostic mammogram          Relevant Orders    Mammo diagnostic right w 3d & cad            CHIEF COMPLAINT:  Treatment discussion, completed adjuvant therapy      Problem:  Cancer Staging  Malignant neoplasm of both ovaries ([facility] [address] )  Staging form: Ovary, Fallopian Tube, Primary Peritoneal, AJCC 8th Edition  - Clinical: ycT3c - Signed by Twyla Cody MD on 12/6/2018        Previous therapy:     Malignant neoplasm of both ovaries ([facility] [address] )    8/16/2018 Biopsy     Paracentesis, metastatic adenocarcinoma, consistent with gyn primary          Chemotherapy     Neoadjuvant chemotherapy with taxol 80 mg/m2 weekly and carboplatin AUC 6 every 21 days   She received 3          11/23/2018 Surgery     Diagnostic laparoscopy, exploratory laparotomy, modified radical hysterectomy, bilateral salpingo-oophorectomy, rectosigmoid resection with end-to-end reanastomosis, gastrocolic omentectomy, peritoneal resection  Residual tumor identified in the ovaries  - 1/25/2019 Chemotherapy     Adjuvant chemotherapy, taxol 175 mg/m2 and carboplatin AUC 5 every 21 days  She received 3 cycles (total cycle 6)  Taxol was dose reduced to 135 mg/m2 with her final cycle due to neutropenia and neuropathy  Patient ID: Rehan Greer is a 77 y o  female  78-year-old returns after completion of adjuvant chemotherapy for stage IIIC ovarian cancer   on 2/19/2019 was 10 3 units/mL  CT scan on 2/15/2019 that I personally reviewed did not reveal any evidence of recurrence although there was an 8 mm asymmetric density in the right breast identified by previous mammogram   Her quality of life is excellent  Her IVC filter has been removed  No other interval change in her medications or medical history since her last visit  The following portions of the patient's history were reviewed and updated as appropriate: allergies, current medications, past family history, past medical history, past social history, past surgical history and problem list     Review of Systems   Constitutional: Negative for activity change and unexpected weight change  HENT: Negative  Eyes: Negative  Respiratory: Negative  Cardiovascular: Negative  Gastrointestinal: Negative for abdominal distention and abdominal pain  Endocrine: Negative  Genitourinary: Negative for pelvic pain and vaginal bleeding  Musculoskeletal: Negative  Skin: Negative  Allergic/Immunologic: Negative  Neurological: Negative  Hematological: Negative  Psychiatric/Behavioral: Negative          Current Outpatient Medications   Medication Sig Dispense Refill    XARELTO 20 MG tablet TAKE 1 TABLET BY MOUTH ONCE DAILY WITH BREAKFAST 30 tablet 3    docusate sodium (COLACE) 100 mg capsule Take 1 capsule (100 mg total) by mouth 2 (two) times a day (Patient not taking: Reported on 2/21/2019) 10 capsule 0    polyethylene glycol (MIRALAX) 17 g packet Take 17 g by mouth daily (Patient not taking: Reported on 2/21/2019) 14 each 0     No current facility-administered medications for this visit  Objective:    Blood pressure 160/80, pulse 100, temperature 98 3 °F (36 8 °C), temperature source Oral, height 5' 2" (1 575 m), weight 71 9 kg (158 lb 9 6 oz)  Body mass index is 29 01 kg/m²  Body surface area is 1 73 meters squared  Physical Exam   Constitutional: She is oriented to person, place, and time  She appears well-developed and well-nourished  No distress  HENT:   Head: Normocephalic and atraumatic  Neck: Normal range of motion  Neck supple  No thyromegaly present  Pulmonary/Chest: Effort normal    Abdominal: Soft  She exhibits no distension and no mass  There is no tenderness  There is no rebound and no guarding  Genitourinary:   Genitourinary Comments: The external female genitalia is normal  The bartholin's, uretheral and skenes glands are normal  The urethral meatus is normal (midline with no lesions)  Anus without fissure or lesion  Speculum exam reveals a grossly normal vagina  No masses, lesions,discharge or bleeding  No significant cystocele or rectocele noted  Bimanual exam notes a surgical absent cervix, uterus and adnexal structures  No masses or fullness  Bladder is without fullness, mass or tenderness  Musculoskeletal: She exhibits no edema  Lymphadenopathy:     She has no cervical adenopathy  Neurological: She is alert and oriented to person, place, and time  Skin: Skin is warm and dry  She is not diaphoretic  Psychiatric: She has a normal mood and affect   Her behavior is normal  Judgment and thought content normal        Lab Results   Component Value Date  10 3 02/19/2019     Lab Results   Component Value Date    K 4 0 02/19/2019     02/19/2019    CO2 30 02/19/2019    BUN 21 02/19/2019    CREATININE 0 91 02/19/2019    GLUF 93 02/06/2019    CALCIUM 9 2 02/19/2019    AST 13 02/19/2019    ALT 12 02/19/2019    ALKPHOS 151 (H) 02/19/2019    EGFR 60 02/19/2019     Lab Results   Component Value Date    WBC 7 68 02/19/2019    HGB 11 3 (L) 02/19/2019    HCT 37 9 02/19/2019    MCV 86 02/19/2019     02/19/2019     Lab Results   Component Value Date    NEUTROABS 4 72 02/19/2019

## 2019-02-21 NOTE — ASSESSMENT & PLAN NOTE
IVC filter removed, currently on Xarelto  The Xarelto will continue for at least 6 months while she is GILBERT

## 2019-02-21 NOTE — H&P (VIEW-ONLY)
Assessment/Plan:    Problem List Items Addressed This Visit        Cardiovascular and Mediastinum    DVT, lower extremity, distal, acute, left ([address] )     IVC filter removed, currently on Xarelto  The Xarelto will continue for at least 6 months while she is [facility]IL[facility]  Genitourinary    Malignant neoplasm of both ovaries [facility])     58-year-old with stage IIIC ovarian cancer who has completed surgery and chemotherapy and is clinically and radiologically without evidence of disease  She does not carry a germline mutation in BRCA  Her performance status is 0   1  Will send molecular testing on her tumor to assess for somatic BRCA mutation  If she has a mutation, she will be a candidate for PARP inhibitor therapy  2  I discussed the role of maintenance therapy in the absence of a germline or somatic BRCA mutation  She understands that although there is a progression free survival advantage, to date, there has been no overall survival advantage  3  IR consultation to remove her intravenous port  4   prior to her next visit in 3 months             Relevant Orders    IR port Removal           Other    Abnormal mammogram of right breast - Primary     Abnormal mammogram of the right breast with CT finding of nodularity in the right upper outer quadrant  Plan for diagnostic mammogram          Relevant Orders    Mammo diagnostic right w 3d & cad            CHIEF COMPLAINT:  Treatment discussion, completed adjuvant therapy      Problem:  Cancer Staging  Malignant neoplasm of both ovaries ([facility] [address] )  Staging form: Ovary, Fallopian Tube, Primary Peritoneal, AJCC 8th Edition  - Clinical: ycT3c - Signed by Arlene Clifton MD on 12/6/2018        Previous therapy:     Malignant neoplasm of both ovaries ([facility] [address] )    8/16/2018 Biopsy     Paracentesis, metastatic adenocarcinoma, consistent with gyn primary          Chemotherapy     Neoadjuvant chemotherapy with taxol 80 mg/m2 weekly and carboplatin AUC 6 every 21 days   She received 3          11/23/2018 Surgery     Diagnostic laparoscopy, exploratory laparotomy, modified radical hysterectomy, bilateral salpingo-oophorectomy, rectosigmoid resection with end-to-end reanastomosis, gastrocolic omentectomy, peritoneal resection  Residual tumor identified in the ovaries  - 1/25/2019 Chemotherapy     Adjuvant chemotherapy, taxol 175 mg/m2 and carboplatin AUC 5 every 21 days  She received 3 cycles (total cycle 6)  Taxol was dose reduced to 135 mg/m2 with her final cycle due to neutropenia and neuropathy  Patient ID: Meryle Common is a 77 y o  female  50-year-old returns after completion of adjuvant chemotherapy for stage IIIC ovarian cancer   on 2/19/2019 was 10 3 units/mL  CT scan on 2/15/2019 that I personally reviewed did not reveal any evidence of recurrence although there was an 8 mm asymmetric density in the right breast identified by previous mammogram   Her quality of life is excellent  Her IVC filter has been removed  No other interval change in her medications or medical history since her last visit  The following portions of the patient's history were reviewed and updated as appropriate: allergies, current medications, past family history, past medical history, past social history, past surgical history and problem list     Review of Systems   Constitutional: Negative for activity change and unexpected weight change  HENT: Negative  Eyes: Negative  Respiratory: Negative  Cardiovascular: Negative  Gastrointestinal: Negative for abdominal distention and abdominal pain  Endocrine: Negative  Genitourinary: Negative for pelvic pain and vaginal bleeding  Musculoskeletal: Negative  Skin: Negative  Allergic/Immunologic: Negative  Neurological: Negative  Hematological: Negative  Psychiatric/Behavioral: Negative          Current Outpatient Medications   Medication Sig Dispense Refill    XARELTO 20 MG tablet TAKE 1 TABLET BY MOUTH ONCE DAILY WITH BREAKFAST 30 tablet 3    docusate sodium (COLACE) 100 mg capsule Take 1 capsule (100 mg total) by mouth 2 (two) times a day (Patient not taking: Reported on 2/21/2019) 10 capsule 0    polyethylene glycol (MIRALAX) 17 g packet Take 17 g by mouth daily (Patient not taking: Reported on 2/21/2019) 14 each 0     No current facility-administered medications for this visit  Objective:    Blood pressure 160/80, pulse 100, temperature 98 3 °F (36 8 °C), temperature source Oral, height 5' 2" (1 575 m), weight 71 9 kg (158 lb 9 6 oz)  Body mass index is 29 01 kg/m²  Body surface area is 1 73 meters squared  Physical Exam   Constitutional: She is oriented to person, place, and time  She appears well-developed and well-nourished  No distress  HENT:   Head: Normocephalic and atraumatic  Neck: Normal range of motion  Neck supple  No thyromegaly present  Pulmonary/Chest: Effort normal    Abdominal: Soft  She exhibits no distension and no mass  There is no tenderness  There is no rebound and no guarding  Genitourinary:   Genitourinary Comments: The external female genitalia is normal  The bartholin's, uretheral and skenes glands are normal  The urethral meatus is normal (midline with no lesions)  Anus without fissure or lesion  Speculum exam reveals a grossly normal vagina  No masses, lesions,discharge or bleeding  No significant cystocele or rectocele noted  Bimanual exam notes a surgical absent cervix, uterus and adnexal structures  No masses or fullness  Bladder is without fullness, mass or tenderness  Musculoskeletal: She exhibits no edema  Lymphadenopathy:     She has no cervical adenopathy  Neurological: She is alert and oriented to person, place, and time  Skin: Skin is warm and dry  She is not diaphoretic  Psychiatric: She has a normal mood and affect   Her behavior is normal  Judgment and thought content normal        Lab Results   Component Value Date  10 3 02/19/2019     Lab Results   Component Value Date    K 4 0 02/19/2019     02/19/2019    CO2 30 02/19/2019    BUN 21 02/19/2019    CREATININE 0 91 02/19/2019    GLUF 93 02/06/2019    CALCIUM 9 2 02/19/2019    AST 13 02/19/2019    ALT 12 02/19/2019    ALKPHOS 151 (H) 02/19/2019    EGFR 60 02/19/2019     Lab Results   Component Value Date    WBC 7 68 02/19/2019    HGB 11 3 (L) 02/19/2019    HCT 37 9 02/19/2019    MCV 86 02/19/2019     02/19/2019     Lab Results   Component Value Date    NEUTROABS 4 72 02/19/2019

## 2019-02-22 ENCOUNTER — HOSPITAL ENCOUNTER (OUTPATIENT)
Dept: INFUSION CENTER | Facility: HOSPITAL | Age: 79
Discharge: HOME | End: 2019-02-22
Payer: MEDICARE

## 2019-02-22 PROCEDURE — 96523 IRRIG DRUG DELIVERY DEVICE: CPT

## 2019-02-22 NOTE — PLAN OF CARE
Problem: Potential for Falls  Goal: Patient will remain free of falls  Description  INTERVENTIONS:  - Assess patient frequently for physical needs  -  Identify cognitive and physical deficits and behaviors that affect risk of falls    -  [place] fall precautions as indicated by assessment   - Educate patient/family on patient safety including physical limitations  - Instruct patient to call for assistance with activity based on assessment  - Modify environment to reduce risk of injury  - Consider OT/PT consult to assist with strengthening/mobility  Outcome: Progressing

## 2019-02-25 ENCOUNTER — TELEPHONE (OUTPATIENT)
Dept: RADIOLOGY | Facility: HOSPITAL | Age: 79
End: 2019-02-25

## 2019-02-28 RX ORDER — SODIUM CHLORIDE 9 MG/ML
50 INJECTION, SOLUTION INTRAVENOUS CONTINUOUS
Status: CANCELLED | OUTPATIENT
Start: 2019-03-04

## 2019-03-06 ENCOUNTER — HOSPITAL ENCOUNTER (OUTPATIENT)
Dept: RADIOLOGY | Facility: HOSPITAL | Age: 79
Discharge: HOME | End: 2019-03-06
Attending: OBSTETRICS & GYNECOLOGY
Payer: MEDICARE

## 2019-03-06 DIAGNOSIS — R92.8 ABNORMAL MAMMOGRAM OF RIGHT BREAST: ICD-10-CM

## 2019-03-06 PROCEDURE — G0279 TOMOSYNTHESIS, MAMMO: HCPCS

## 2019-03-06 PROCEDURE — 77065 DX MAMMO INCL CAD UNI: CPT

## 2019-03-07 RX ORDER — SODIUM CHLORIDE 9 MG/ML
50 INJECTION, SOLUTION INTRAVENOUS CONTINUOUS
Status: CANCELLED | OUTPATIENT
Start: 2019-03-11

## 2019-03-11 ENCOUNTER — HOSPITAL ENCOUNTER (OUTPATIENT)
Dept: NON INVASIVE DIAGNOSTICS | Facility: HOSPITAL | Age: 79
Discharge: HOME | End: 2019-03-11
Attending: OBSTETRICS & GYNECOLOGY | Admitting: RADIOLOGY
Payer: MEDICARE

## 2019-03-11 VITALS
SYSTOLIC BLOOD PRESSURE: 143 MMHG | BODY MASS INDEX: 29.08 KG/M2 | TEMPERATURE: 97.5 F | DIASTOLIC BLOOD PRESSURE: 67 MMHG | HEIGHT: 62 IN | RESPIRATION RATE: 18 BRPM | HEART RATE: 85 BPM | WEIGHT: 158 LBS | OXYGEN SATURATION: 97 %

## 2019-03-11 DIAGNOSIS — C56.3 MALIGNANT NEOPLASM OF BOTH OVARIES (HCC): ICD-10-CM

## 2019-03-11 PROCEDURE — 36590 REMOVAL TUNNELED CV CATH: CPT | Performed by: RADIOLOGY

## 2019-03-11 PROCEDURE — 36590 REMOVAL TUNNELED CV CATH: CPT

## 2019-03-11 RX ORDER — SODIUM CHLORIDE 9 MG/ML
50 INJECTION, SOLUTION INTRAVENOUS CONTINUOUS
Status: DISCONTINUED | OUTPATIENT
Start: 2019-03-11 | End: 2019-03-12 | Stop reason: HOSPADM

## 2019-03-11 RX ORDER — FENTANYL CITRATE 50 UG/ML
INJECTION, SOLUTION INTRAMUSCULAR; INTRAVENOUS CODE/TRAUMA/SEDATION MEDICATION
Status: COMPLETED | OUTPATIENT
Start: 2019-03-11 | End: 2019-03-11

## 2019-03-11 RX ORDER — HYDROCODONE BITARTRATE AND ACETAMINOPHEN 5; 325 MG/1; MG/1
1 TABLET ORAL EVERY 6 HOURS PRN
Status: DISCONTINUED | OUTPATIENT
Start: 2019-03-11 | End: 2019-03-12 | Stop reason: HOSPADM

## 2019-03-11 RX ORDER — LIDOCAINE HYDROCHLORIDE AND EPINEPHRINE 10; 10 MG/ML; UG/ML
INJECTION, SOLUTION INFILTRATION; PERINEURAL CODE/TRAUMA/SEDATION MEDICATION
Status: COMPLETED | OUTPATIENT
Start: 2019-03-11 | End: 2019-03-11

## 2019-03-11 RX ORDER — MIDAZOLAM HYDROCHLORIDE 1 MG/ML
INJECTION INTRAMUSCULAR; INTRAVENOUS CODE/TRAUMA/SEDATION MEDICATION
Status: COMPLETED | OUTPATIENT
Start: 2019-03-11 | End: 2019-03-11

## 2019-03-11 RX ADMIN — MIDAZOLAM HYDROCHLORIDE 1 MG: 1 INJECTION, SOLUTION INTRAMUSCULAR; INTRAVENOUS at 15:19

## 2019-03-11 RX ADMIN — LIDOCAINE HYDROCHLORIDE AND EPINEPHRINE 7 ML: 10; 10 INJECTION, SOLUTION INFILTRATION; PERINEURAL at 15:19

## 2019-03-11 RX ADMIN — SODIUM CHLORIDE 50 ML/HR: 0.9 INJECTION, SOLUTION INTRAVENOUS at 12:41

## 2019-03-11 RX ADMIN — FENTANYL CITRATE 50 MCG: 50 INJECTION, SOLUTION INTRAMUSCULAR; INTRAVENOUS at 15:19

## 2019-03-11 NOTE — DISCHARGE INSTRUCTIONS
Implanted Venous Access Port Removal    WHAT YOU NEED TO KNOW:   An implanted venous access port is a device used to give treatments and take blood  It may also be called a central venous access device (CVAD)  The port is a small container that is placed under your skin, usually in your upper chest  A port can also be placed in your arm or abdomen  The port is attached to a catheter that enters a large vein  DISCHARGE INSTRUCTIONS:   Resume your normal diet  Small sips of flat soda will help with mild nausea  Prevent an infection:     Wash your hands often  Use soap and water  Clean your hands before and  after you care for your incision  Check your skin for infection every day  Look for redness, swelling, or fluid oozing from the incision site  Dressing may come off in 24 hours  Medical glue will peel off on its own in 5 to 10 days  You may shower 24 hours after procedure  Follow up with your healthcare provider as directed  Write down your questions so you remember to ask them during your visits  Activity:  You may return to your daily activities when the area heals  Contact Interventional Radiology at [phone] Stephanie PATIENTS: Contact Interventional Radiology at [phone]) Rafi Knott PATIENTS: Contact Interventional Radiology at [phone]) if:     You have a fever  You have persistent nausea  Your inciscion site is red, swollen, or draining pus  You have questions or concerns about your condition or care  Seek care immediately or call 911 if:  Blood soaks through your bandage  The skin over or around your incision breaks open  Your heart is jumping or fluttering  You have a headache, blurred vision, and feel confused  You have pain in your arm, neck, shoulder, or chest     You have trouble breathing that is getting worse over time  Procedural Sedation   WHAT YOU NEED TO KNOW:   Procedural sedation is medicine used during procedures to help you feel relaxed and calm  You will remember little to none of the procedure  After sedation you may feel tired, weak, or unsteady on your feet  You may also have trouble concentrating or short-term memory loss  These symptoms should go away in 24 hours or less  DISCHARGE INSTRUCTIONS:     Call 911 or have someone else call for any of the following:   · You have sudden trouble breathing      · You cannot be woken  Contact Interventional Radiology at [phone]   Stephanie PATIENTS: Contact Interventional Radiology at [phone]) Don Nelson PATIENTS: Contact Interventional Radiology at [phone]) if any of the following occur:     · You have a severe headache or dizziness      · Your heart is beating faster than usual     · You have a fever or chills      · Your skin is itchy, swollen, or you have a rash      · You have nausea or are vomiting for more than 8 hours after the procedure       · You have questions or concerns about your condition or care  Self-care:   · Have someone stay with you for 24 hours  This person can drive you to errands and help you do things around the house  This person can also watch for problems       · Rest and do quiet activities for 24 hours  Do not exercise, ride a bike, or play sports  Stand up slowly to prevent dizziness and falls  Take short walks around the house with another person  Slowly return to your usual activities the next day       · Do not drive or use dangerous machines or tools for 24 hours  You may injure yourself or others  Examples include a lawnmower, saw, or drill  Do not return to work for 24 hours if you use dangerous machines or tools for work       · Do not make important decisions for 24 hours  For example, do not sign important papers or invest money       · Drink liquids as directed  Liquids help flush the sedation medicine out of your body   Ask how much liquid to drink each day and which liquids are best for you       · Eat small, frequent meals to prevent nausea and vomiting  Start with clear liquids such as juice or broth  If you do not vomit after clear liquids, you can eat your usual foods       · Do not drink alcohol or take medicines that make you drowsy  This includes medicines that help you sleep and anxiety medicines  Ask your healthcare provider if it is safe for you to take pain medicine  Follow up with your healthcare provider as directed: Write down your questions so you remember to ask them during your visits

## 2019-03-11 NOTE — INTERVAL H&P NOTE
Update: (This section must be completed if the H&P was completed greater than 24 hrs to procedure or admission)    H&P reviewed  After examining the patient, I find no changed to the H&P since it had been written  Patient re-evaluated   Accept as history and physical     Denisse Little MD/March 11, 2019/3:05 PM

## 2019-03-11 NOTE — BRIEF OP NOTE (RAD/CATH)
IR PORT REMOVAL  Procedure Note    PATIENT NAME: Ayala Holbrook  : 1940  MRN: [id number]     Pre-op Diagnosis:   1  Malignant neoplasm of both ovaries ([facility] [address] )      Post-op Diagnosis:   1   Malignant neoplasm of both ovaries [facility])        Surgeon:   Anthony Camarena MD  Assistants:     No qualified resident was available, Resident is only observing    Estimated Blood Loss: minimal  Findings: Successful port removal    Specimens: none    Complications:  None immediate    Anesthesia: Conscious sedation    Anthony Camarena MD     Date: 3/11/2019  Time: 3:07 PM

## 2019-05-30 PROBLEM — Z85.42 ENCOUNTER FOR FOLLOW-UP SURVEILLANCE OF ENDOMETRIAL CANCER: Status: ACTIVE | Noted: 2019-01-01

## 2019-05-30 PROBLEM — Z85.43 ENCOUNTER FOR FOLLOW-UP SURVEILLANCE OF OVARIAN CANCER: Status: ACTIVE | Noted: 2019-01-01

## 2019-05-30 PROBLEM — Z08 ENCOUNTER FOR FOLLOW-UP SURVEILLANCE OF ENDOMETRIAL CANCER: Status: ACTIVE | Noted: 2019-01-01

## 2019-05-30 PROBLEM — Z85.43 HISTORY OF OVARIAN CANCER: Status: ACTIVE | Noted: 2018-08-22

## 2019-09-05 NOTE — PROGRESS NOTES
Assessment/Plan:    Problem List Items Addressed This Visit        Other    History of ovarian cancer - Primary     History of stage IIIC ovarian cancer who completed adjuvant chemotherapy in January 2019  Her clinical exam is without evidence of disease recurrence  Her  performed on 8/28/2019 was elevated at 158 (previously 10 3)  CT imaging performed on 8/30/2019 was without measurable disease  The patient is asymptomatic  We discussed that her recent rise in  is concerning for possible disease recurrence, but given that the patient is asymptomatic and there is no measurable disease on CT imaging, there is no reason to initiate treatment for disease recurrence  Will plan to repeat  in 2 months time  Depending on value, will consider repeat imaging at that time  The patient will call our office if she develops new symptoms  Plan for provisional appointment to return to the office in 3 months for continued cancer surveillance  Relevant Orders                CHIEF COMPLAINT:   Ovarian cancer surveillance    Problem:  Cancer Staging  History of ovarian cancer  Staging form: Ovary, Fallopian Tube, Primary Peritoneal, AJCC 8th Edition  - Clinical: ycT3c - Signed by Patricia Leung MD on 12/6/2018        Previous therapy:     History of ovarian cancer    8/16/2018 Biopsy     Paracentesis, metastatic adenocarcinoma, consistent with gyn primary       Chemotherapy     Neoadjuvant chemotherapy with taxol 80 mg/m2 weekly and carboplatin AUC 6 every 21 days  She received 3       10/11/2018 Genetic Testing     Morenita Ip, wild-type  11/23/2018 Surgery     Diagnostic laparoscopy, exploratory laparotomy, modified radical hysterectomy, bilateral salpingo-oophorectomy, rectosigmoid resection with end-to-end reanastomosis, gastrocolic omentectomy, peritoneal resection  Residual tumor identified in the ovaries         - 1/25/2019 Chemotherapy     Adjuvant chemotherapy, taxol 175 mg/m2 and carboplatin AUC 5 every 21 days  She received 3 cycles (total cycle 6)  Taxol was dose reduced to 135 mg/m2 with her final cycle due to neutropenia and neuropathy  [address] Medicine testing suspended  Malignant cells were not >20% of content, unable to proceed with testing  Will require re-sampling in future  Patient ID: Ashu Mcintosh is a [de-identified] y o  female  who has no new complaints today  No vaginal bleeding, abdominal/pelvic pain  Normal bowel and bladder function  The patient recently had an elevation in her , to 158  CT imaging performed on 8/30/19 to evaluate for recurrence  No evidence of disease recurrence was noted, stable pulmonary nodule present  Quality of life is good  The following portions of the patient's history were reviewed and updated as appropriate: allergies, current medications, past medical history and problem list     Review of Systems   Constitutional: Negative  HENT: Negative  Eyes: Negative  Respiratory: Negative  Cardiovascular: Negative  Gastrointestinal: Negative  Genitourinary: Negative  Musculoskeletal: Negative  Skin: Negative  Neurological: Negative  Psychiatric/Behavioral: Negative  Current Outpatient Medications   Medication Sig Dispense Refill    rivaroxaban (XARELTO) 20 mg tablet Take 1 tablet (20 mg total) by mouth daily with breakfast 30 tablet 3     No current facility-administered medications for this visit  Objective:    Blood pressure 160/84, pulse 97, temperature (!) 97 1 °F (36 2 °C), temperature source Oral, height 5' 2" (1 575 m), weight 71 7 kg (158 lb)  Body mass index is 28 9 kg/m²  Body surface area is 1 73 meters squared  Physical Exam   Constitutional: She is oriented to person, place, and time  She appears well-developed and well-nourished  HENT:   Head: Normocephalic and atraumatic  Neck: Normal range of motion  Neck supple  No thyromegaly present  Pulmonary/Chest: Effort normal    Abdominal: Soft  She exhibits no distension and no mass  There is no rebound  Genitourinary:   Genitourinary Comments: The external female genitalia is normal  The bartholin's, uretheral and skenes glands are normal  The urethral meatus is normal (midline with no lesions)  Anus without fissure or lesion  Speculum exam reveals a grossly normal vagina  No masses, lesions, discharge or bleeding  No significant cystocele or rectocele noted  Bimanual exam notes a surgical absent cervix, uterus and adnexal structures  No masses or fullness  Bladder is without fullness, mass or tenderness  Musculoskeletal: Normal range of motion  She exhibits no edema  Lymphadenopathy:     She has no cervical adenopathy  Neurological: She is alert and oriented to person, place, and time  Skin: Skin is warm and dry  No rash noted  Psychiatric: She has a normal mood and affect  Her behavior is normal    Vitals reviewed  Lab Results   Component Value Date     158 0 (H) 08/28/2019       Study Result (8/30/2019)    CT CHEST, ABDOMEN AND PELVIS WITH IV CONTRAST     INDICATION:   R97 1: Elevated cancer antigen 125 ()  Z85 43: Personal history of malignant neoplasm of ovary      COMPARISON:  2/15/2019; PET/CT 9/18/2018     TECHNIQUE: CT examination of the chest, abdomen and pelvis was performed  Axial, sagittal, and coronal 2D reformatted images were created from the source data and submitted for interpretation      Radiation dose length product (DLP) for this visit:  549 71 mGy-cm     This examination, like all CT scans performed in the [facility], was performed utilizing techniques to minimize radiation dose exposure, including the use of iterative   reconstruction and automated exposure control      IV Contrast:  100 mL of iohexol (OMNIPAQUE)  Enteric Contrast: Enteric contrast was administered      FINDINGS:     CHEST     LUNGS:  Stable 11 x 9 mm lobular, bilobed nodule, previously 11 x 9 mm, in the superior segment right lower lobe  This was not hypermetabolic on recent PET/CT and is likely benign  No new suspicious parenchymal or endobronchial lesions      PLEURA:  No pleural effusions or pneumothorax      HEART/GREAT VESSELS:  Unremarkable for patient's age      MEDIASTINUM AND SIMI:  Stable subcentimeter middle mediastinal lymph nodes with no pathologic intrathoracic lymphadenopathy      CHEST WALL AND LOWER NECK:   Stable 6 mm right thyroid nodule, likely benign  Previously described right breast periareolar asymmetry is of diminished conspicuity on today's exam and had been evaluated with diagnostic mammography 3/6/2019  New 9 x 8 mm left axillary lymph node (2/25)  Surveillance recommended      ABDOMEN     LIVER/BILIARY TREE:  Unremarkable      GALLBLADDER:  No calcified gallstones  No pericholecystic inflammatory change      SPLEEN:  Unremarkable      PANCREAS:  Unremarkable      ADRENAL GLANDS:  Unremarkable      KIDNEYS/URETERS:  Bilateral peripelvic cysts stable with no hydronephrosis, solid mass lesions, or perinephric collections      STOMACH AND BOWEL:  Stable colorectal anastomosis with no bowel obstruction or wall thickening      APPENDIX:  No findings to suggest appendicitis      ABDOMINOPELVIC CAVITY:  No ascites or free intraperitoneal air  No lymphadenopathy      VESSELS:  Unremarkable for patient's age      PELVIS     REPRODUCTIVE ORGANS:  Patient is status post hysterectomy      URINARY BLADDER:  Unremarkable      ABDOMINAL WALL/INGUINAL REGIONS:  Fat-containing inguinal hernias stable      OSSEOUS STRUCTURES:  No acute fracture or destructive osseous lesion      IMPRESSION:        1  Stable 11 x 9 mm bilobed right lower lobe pulmonary nodule which was not hypermetabolic on most recent PET/CT, likely benign    2   No metastatic disease in the chest, abdomen or pelvis            Workstation performed: [id number]

## 2019-09-05 NOTE — ASSESSMENT & PLAN NOTE
History of stage IIIC ovarian cancer who completed adjuvant chemotherapy in January 2019  Her clinical exam is without evidence of disease recurrence  Her  performed on 8/28/2019 was elevated at 158 (previously 10 3)  CT imaging performed on 8/30/2019 was without measurable disease  The patient is asymptomatic  We discussed that her recent rise in  is concerning for possible disease recurrence, but given that the patient is asymptomatic and there is no measurable disease on CT imaging, there is no reason to initiate treatment for disease recurrence  Will plan to repeat  in 2 months time  Depending on value, will consider repeat imaging at that time  The patient will call our office if she develops new symptoms  Plan for provisional appointment to return to the office in 3 months for continued cancer surveillance

## 2019-09-13 NOTE — TELEPHONE ENCOUNTER
Met with patient and Dr Mayda Barrett                  regarding recommendation for;      ___x__ RIGHT ______LEFT      ____x_Ultrasound guided  ______Stereotactic  Breast biopsy  ___x__Verbalized understanding        Blood thinners:  _____yes ___x__no    Date stopped: _____n/a______    Biopsy teaching sheet given:  ____x___yes ______no

## 2019-09-17 NOTE — SEDATION DOCUMENTATION
US guided right breast biopsy done  Wing clip marker placed  Pt  Tolerated well  Steristrips and dsd to site  No bleeding noted  Discharge instructions reviewed with pt  And copy to her  Ice pack provided

## 2019-09-18 NOTE — TELEPHONE ENCOUNTER
Post procedure call completed on 9/18/2019 at 0841    Bleeding: _____yes __x___no    Pain: _____yes ___x___no    Redness/Swelling: ______yes __x____no    Band aid removed: _____yes ___x__no    Steri-Strips intact: ___x___yes _____no

## 2019-09-23 NOTE — TELEPHONE ENCOUNTER
Pt given breast biopsy results by Dr Sam Frank, per Dr Keyla Hassan pt has appt for follow up in December with Dr Lance Leiva, Gyn Onc and will call office to make sooner if needed, pt wants no additional treatment at this time    Dr Keyla Hassan will reach out to Dr Reena Garces office to notify of pt being given breast biopsy results

## 2019-09-24 PROBLEM — C56.9 OVARIAN CANCER (HCC): Status: ACTIVE | Noted: 2018-08-22

## 2019-09-24 NOTE — PROGRESS NOTES
Assessment/Plan:    Problem List Items Addressed This Visit        Endocrine    Ovarian cancer ([facility] [address] ) - Primary     59-year-old with recurrent stage IIIC ovarian cancer, left axillary lymph node, right breast metastasis  She has an elevated   Her performance status is 0   1  Will send breast biopsy for molecular testing as it was not able to be performed on her initial tumor specimen  2  I discussed the risks and benefits of starting chemotherapy with carboplatin at AUC 5, liposomal doxorubicin at 30 milligrams/meter squared, Avastin at 10 milligrams/kilogram   The carboplatin and liposomal doxorubicin her dosed every 28 days, the Avastin every 14 days  She understands the risks and benefits of treatment  She was provided with written information regarding side effects  Consent was obtained by me in the office  The plan will be to treat for 3 cycles followed by repeat CT imaging   will also be followed prior to each carboplatin treatment  3  Referral to interventional Radiology for port placement  4  She understands that she does not have curable disease and all interventions are palliative  I spent 25 minutes with the patient  More than half the time was spent in counseling and coordination of care regarding treatment of her recurrent ovarian cancer             Relevant Orders    IR port Placement    Infusion Calculated Appointment Request    Infusion Calculated Appointment Request    Infusion Calculated Appointment Request    Infusion Calculated Appointment Request    Infusion Calculated Appointment Request    Infusion Calculated Appointment Request    Infusion Appointment Request    Infusion Appointment Request    Infusion Appointment Request    Infusion Appointment Request    Infusion Appointment Request    Infusion Appointment Request    VAS lower limb venous duplex study, complete bilateral       Cardiovascular and Mediastinum    DVT, lower extremity, distal, acute, left (HCC)    Relevant Orders    VAS lower limb venous duplex study, complete bilateral            CHIEF COMPLAINT:  Treatment discussion      Problem:  Cancer Staging  Ovarian cancer [facility])  Staging form: Ovary, Fallopian Tube, Primary Peritoneal, AJCC 8th Edition  - Clinical: ycT3c - Signed by Eliazar Ghotra MD on 12/6/2018        Previous therapy:     Ovarian cancer ([address] )    8/16/2018 Biopsy     Paracentesis, metastatic adenocarcinoma, consistent with gyn primary       Chemotherapy     Neoadjuvant chemotherapy with taxol 80 mg/m2 weekly and carboplatin AUC 6 every 21 days  She received 3       10/11/2018 Genetic Testing     Jaye Mercedes, wild-type  11/23/2018 Surgery     Diagnostic laparoscopy, exploratory laparotomy, modified radical hysterectomy, bilateral salpingo-oophorectomy, rectosigmoid resection with end-to-end reanastomosis, gastrocolic omentectomy, peritoneal resection  Residual tumor identified in the ovaries  - 1/25/2019 Chemotherapy     Adjuvant chemotherapy, taxol 175 mg/m2 and carboplatin AUC 5 every 21 days  She received 3 cycles (total cycle 6)  Taxol was dose reduced to 135 mg/m2 with her final cycle due to neutropenia and neuropathy  [address] [place] [address] Medicine testing suspended  Malignant cells were not >20% of content, unable to proceed with testing  Will require re-sampling in future  10/8/2019 -  Chemotherapy     CARBOplatin (PARAPLATIN) in sodium chloride 0 9 % 250 mL IVPB, , Intravenous, Once, 0 of 6 cycles  bevacizumab (AVASTIN) 762 5 mg in sodium chloride 0 9 % 100 mL IVPB, 10 mg/kg = 762 5 mg, Intravenous, Once, 0 of 7 cycles  DOXOrubicin liposome (DOXIL) 53 4 mg in dextrose 5 % 250 mL chemo infusion, 30 mg/m2, Intravenous, Once, 0 of 6 cycles           Patient ID: Marisa Duarte is a [de-identified] y o  female  79-year-old returns for treatment discussion  She had a rising  in August of 2019  It was 158 units/ml from a khushi value of 10 3 units/ml    CT scan of the chest abdomen pelvis on 8/30/2019 revealed a stable 1 1 cm right lower lobe nodule that was negative on prior PET imaging  She had a new left axillary lymph node measuring 8 x 9 mm  At that time, the decision was made to continue to follow  and imaging without treatment as there was no measurable disease  She then underwent mammography on 9/10/2019 which revealed right breast mass  Ultrasound demonstrated a 1 cm right breast mass  Biopsy on 9/17/2019 revealed metastatic adenocarcinoma to the breast, ovarian origin  She is now here to discuss treatment options  There is no other interval change in her medications or medical history since her last visit  She currently does not take any medications  The following portions of the patient's history were reviewed and updated as appropriate: allergies, current medications, past family history, past medical history, past social history, past surgical history and problem list     Review of Systems    Current Outpatient Medications   Medication Sig Dispense Refill    rivaroxaban (XARELTO) 20 mg tablet Take 1 tablet (20 mg total) by mouth daily with breakfast (Patient not taking: Reported on 9/24/2019) 30 tablet 3     No current facility-administered medications for this visit  Objective:    Blood pressure 152/74, pulse 90, temperature 98 1 °F (36 7 °C), temperature source Oral, resp  rate 20, height 5' 2" (1 575 m), weight 76 2 kg (168 lb 1 6 oz)  Body mass index is 30 75 kg/m²  Body surface area is 1 77 meters squared  Physical Exam   Constitutional: She is oriented to person, place, and time  She appears well-developed and well-nourished  No distress  Pulmonary/Chest: Effort normal    Neurological: She is alert and oriented to person, place, and time  Skin: She is not diaphoretic  Psychiatric: She has a normal mood and affect   Her behavior is normal  Judgment and thought content normal        Lab Results   Component Value Date     158 0 (H) 08/28/2019     Lab Results   Component Value Date    K 4 0 02/19/2019     02/19/2019    CO2 30 02/19/2019    BUN 26 (H) 08/28/2019    CREATININE 0 72 08/28/2019    GLUF 93 02/06/2019    CALCIUM 9 2 02/19/2019    AST 13 02/19/2019    ALT 12 02/19/2019    ALKPHOS 151 (H) 02/19/2019    EGFR 79 08/28/2019     Lab Results   Component Value Date    WBC 7 68 02/19/2019    HGB 11 3 (L) 02/19/2019    HCT 37 9 02/19/2019    MCV 86 02/19/2019     02/19/2019     Lab Results   Component Value Date    NEUTROABS 4 72 02/19/2019

## 2019-09-24 NOTE — ASSESSMENT & PLAN NOTE
[de-identified]year-old with recurrent stage IIIC ovarian cancer, left axillary lymph node, right breast metastasis  She has an elevated   Her performance status is 0   1  Will send breast biopsy for molecular testing as it was not able to be performed on her initial tumor specimen  2  I discussed the risks and benefits of starting chemotherapy with carboplatin at AUC 5, liposomal doxorubicin at 30 milligrams/meter squared, Avastin at 10 milligrams/kilogram   The carboplatin and liposomal doxorubicin her dosed every 28 days, the Avastin every 14 days  She understands the risks and benefits of treatment  She was provided with written information regarding side effects  Consent was obtained by me in the office  The plan will be to treat for 3 cycles followed by repeat CT imaging   will also be followed prior to each carboplatin treatment  3  Referral to interventional Radiology for port placement  4  She understands that she does not have curable disease and all interventions are palliative  I spent 25 minutes with the patient  More than half the time was spent in counseling and coordination of care regarding treatment of her recurrent ovarian cancer

## 2019-09-30 NOTE — BRIEF OP NOTE (RAD/CATH)
IR PORT PLACEMENT  Procedure Note    PATIENT NAME: Judy Mejia  : 1940  MRN: [id number]     Pre-op Diagnosis:   1  Malignant neoplasm of ovary, unspecified laterality (HCC)      Post-op Diagnosis:   1   Malignant neoplasm of ovary, unspecified laterality [facility])        Surgeon:   Althea Aguayo MD  Assistants:     No qualified resident was available, Resident is only observing    Estimated Blood Loss: minimal  Findings: Successful port placement    Specimens: none    Complications:  None immediate    Anesthesia: Conscious sedation    Althea Aguayo MD     Date: 2019  Time: 12:19 PM

## 2019-09-30 NOTE — SEDATION DOCUMENTATION
Procedure ended  Portacath placed in right chest wall  Surface sutures, exofin, and gauze to incision  Dressing is clean, dry, and intact   Patient tolerated well and transferred to [facility] in stable condition

## 2019-09-30 NOTE — DISCHARGE INSTRUCTIONS
Implanted Venous Access Port     WHAT YOU NEED TO KNOW:   An implanted venous access port is a device used to give treatments and take blood  It may also be called a central venous access device (CVAD)  The port is a small container that is placed under your skin, usually in your upper chest  The port is attached to a catheter that enters a large vein  DISCHARGE INSTRUCTIONS:   Resume your normal diet  Small sips of flat soda will help with mild nausea  Prevent an infection:   · Wash your hands often  Use soap and water  Clean your hands before and after you care for your port  Remind everyone who cares for your port to wash their hands  · Check your skin for infection every day  Look for redness, swelling, or fluid oozing from the port site  Care for your port:   1  You may shower beginning 48 hours after procedure  2  Change dressing if it becomes wet  3  Remove dressing after 24 hours  Leave glue in place  4  It is normal for some bruising to occur  5  Use Tylenol for pain  6  Limit use of arm on the side that your port was placed  Lift nothing heavier than 5 pounds for 1 week, and then gradually increase activity as tolerated  7  DO NOT apply ointment, lotion or cream to port site until incision is healed  Allow glue to fall off  DO NOT attempt to peel glue from skin even it it begins to flake  8, After the port incision is healed you may swim, bathe  Notify the Interventional Radiologist if you have any of the followin  Fever above 101 F    2  Increased redness or swelling after 1st day  3  Increased pain after 1st day  4  Any sign of infection (drainage from port site, skin separation, hot to touch)  5  Persistent nausea or vomiting  Contact Interventional Radiology at [phone] [facility] PATIENTS: Contact Interventional Radiology at [phone]) ([address] [facility] [address]: Contact Interventional Radiology at [phone])        Procedural Sedation   WHAT YOU NEED TO KNOW:   Procedural sedation is medicine used during procedures to help you feel relaxed and calm  You will remember little to none of the procedure  After sedation you may feel tired, weak, or unsteady on your feet  You may also have trouble concentrating or short-term memory loss  These symptoms should go away in 24 hours or less  DISCHARGE INSTRUCTIONS:     Call 911 or have someone else call for any of the following:   · You have sudden trouble breathing      · You cannot be woken  Contact Interventional Radiology at [phone]   [facility] PATIENTS: Contact Interventional Radiology at [phone]) Kylealan Eatonana PATIENTS: Contact Interventional Radiology at [phone]) if any of the following occur:     · You have a severe headache or dizziness      · Your heart is beating faster than usual     · You have a fever or chills      · Your skin is itchy, swollen, or you have a rash      · You have nausea or are vomiting for more than 8 hours after the procedure       · You have questions or concerns about your condition or care  Self-care:   · Have someone stay with you for 24 hours  This person can drive you to errands and help you do things around the house  This person can also watch for problems       · Rest and do quiet activities for 24 hours  Do not exercise, ride a bike, or play sports  Stand up slowly to prevent dizziness and falls  Take short walks around the house with another person  Slowly return to your usual activities the next day       · Do not drive or use dangerous machines or tools for 24 hours  You may injure yourself or others  Examples include a lawnmower, saw, or drill  Do not return to work for 24 hours if you use dangerous machines or tools for work       · Do not make important decisions for 24 hours  For example, do not sign important papers or invest money       · Drink liquids as directed  Liquids help flush the sedation medicine out of your body   Ask how much liquid to drink each day and which liquids are best for you       · Eat small, frequent meals to prevent nausea and vomiting  Start with clear liquids such as juice or broth  If you do not vomit after clear liquids, you can eat your usual foods       · Do not drink alcohol or take medicines that make you drowsy  This includes medicines that help you sleep and anxiety medicines  Ask your healthcare provider if it is safe for you to take pain medicine  Follow up with your healthcare provider as directed: Write down your questions so you remember to ask them during your visits

## 2019-10-11 NOTE — PLAN OF CARE
Problem: Potential for Falls  Goal: Patient will remain free of falls  Description  INTERVENTIONS:  - Assess patient frequently for physical needs  -  Identify cognitive and physical deficits and behaviors that affect risk of falls    -  [place] fall precautions as indicated by assessment   - Educate patient/family on patient safety including physical limitations  - Instruct patient to call for assistance with activity based on assessment  - Modify environment to reduce risk of injury  - Consider OT/PT consult to assist with strengthening/mobility  Outcome: Progressing

## 2019-10-25 NOTE — PROGRESS NOTES
Spoke with Kezia Roman, ok to treat today - have pt continue to log her BP - and make an appointment with her pcp - reviewed with pt

## 2019-10-25 NOTE — PLAN OF CARE
Problem: Potential for Falls  Goal: Patient will remain free of falls  Description  INTERVENTIONS:  - Assess patient frequently for physical needs  -  Identify cognitive and physical deficits and behaviors that affect risk of falls    -  [place] fall precautions as indicated by assessment   - Educate patient/family on patient safety including physical limitations  - Instruct patient to call for assistance with activity based on assessment  - Modify environment to reduce risk of injury  - Consider OT/PT consult to assist with strengthening/mobility  Outcome: Progressing

## 2019-11-07 NOTE — PROGRESS NOTES
Assessment/Plan:    Problem List Items Addressed This Visit        Endocrine    Ovarian cancer ([facility] [address] ) - Primary     Recurrent stage IIIC ovarian cancer with elevated , left axillary lymph node and right breast metastasis  She is currently receiving doxil 30 mg/m2 and carboplatin AUC 5 every 28 days with avastin 10 mg/kg every 14 days  The patient tolerated her first cycle of treatment extremely well  Her  is declining  She was recently started on an anti-hypertensive  Continue with cycle 2 of treatment as planned  Her metabolic and hematologic parameters from 11/6/19 were reviewed, and are adequate for treatment  Return to the office as per her chemotherapy calendar  CHIEF COMPLAINT:   Pre-chemotherapy evaluation    Problem:  Cancer Staging  [facility] ([facility] Recurrent   Staging form: Ovary, Fallopian Tube, Primary Peritoneal, AJCC 8th Edition  - Clinical: ycT3c - Signed by Meka Joseph MD on 12/6/2018        Previous therapy:     Ovarian cancer ([facility] [address] )    8/16/2018 Biopsy     Paracentesis, metastatic adenocarcinoma, consistent with gyn primary       Chemotherapy     Neoadjuvant chemotherapy with taxol 80 mg/m2 weekly and carboplatin AUC 6 every 21 days  She received 3       10/11/2018 Genetic Testing     Gaetano Ye, wild-type  11/23/2018 Surgery     Diagnostic laparoscopy, exploratory laparotomy, modified radical hysterectomy, bilateral salpingo-oophorectomy, rectosigmoid resection with end-to-end reanastomosis, gastrocolic omentectomy, peritoneal resection  Residual tumor identified in the ovaries  - 1/25/2019 Chemotherapy     Adjuvant chemotherapy, taxol 175 mg/m2 and carboplatin AUC 5 every 21 days  She received 3 cycles (total cycle 6)  Taxol was dose reduced to 135 mg/m2 with her final cycle due to neutropenia and neuropathy  [address] [facility] Medicine testing suspended   Malignant cells were not >20% of content, unable to proceed with testing  Will require re-sampling in future  10/11/2019 -  Chemotherapy     Doxil 30 mg/m2 and carboplatin AUC 5 every 28 days, with avastin 10 mg/kg every 14 days  Avastin was held with day 1 of cycle 1 due to hypertension  The patient is scheduled for cycle 2 of treatment  Patient ID: David Moya is a [de-identified] y o  female  who presents to the office for pre-chemotherapy evaluation  The patient tolerated her first cycle of treatment well  She is without acute complaints  The patient has been afebrile  Normal bowel/bladder function  She denies n/v/abdominal pain  Appetite is excellent  Patient denies sores on her hands, feet or mouth  She was recently started on a anti-hypertensive  The patient's quality of life is excellent  The following portions of the patient's history were reviewed and updated as appropriate: allergies, current medications, past medical history and problem list     Review of Systems   Constitutional: Negative  HENT: Negative  Eyes: Negative  Respiratory: Negative  Cardiovascular: Negative  Gastrointestinal: Negative  Genitourinary: Negative  Musculoskeletal: Negative  Skin: Negative  Neurological: Negative  Psychiatric/Behavioral: Negative          Current Outpatient Medications   Medication Sig Dispense Refill    hydrochlorothiazide (HYDRODIURIL) 12 5 mg tablet Take 12 5 mg by mouth daily as needed  3    LORazepam (ATIVAN) 1 mg tablet Take 1 tablet (1 mg total) by mouth every 8 (eight) hours as needed (nausea or anxiety) (Patient not taking: Reported on 11/7/2019) 20 tablet 1    losartan (COZAAR) 50 mg tablet Take 50 mg by mouth daily  3    ondansetron (ZOFRAN) 8 mg tablet Take 1 tablet (8 mg total) by mouth every 8 (eight) hours as needed for nausea or vomiting (Patient not taking: Reported on 11/7/2019) 20 tablet 1    rivaroxaban (XARELTO) 20 mg tablet Take 1 tablet (20 mg total) by mouth daily with breakfast (Patient not taking: Reported on 10/11/2019) 30 tablet 3     No current facility-administered medications for this visit  Objective:    Blood pressure 126/82, pulse 96, temperature 98 3 °F (36 8 °C), temperature source Oral, height 5' 1 5" (1 562 m), weight 76 7 kg (169 lb)  Body mass index is 31 42 kg/m²  Body surface area is 1 77 meters squared  Physical Exam   Constitutional: She is oriented to person, place, and time  She appears well-developed and well-nourished  Pulmonary/Chest: Effort normal    Neurological: She is alert and oriented to person, place, and time  Skin: Skin is warm and dry  No rash noted  Psychiatric: She has a normal mood and affect   Her behavior is normal        Lab Results   Component Value Date     326 5 (H) 11/06/2019     Lab Results   Component Value Date    K 4 1 11/06/2019     11/06/2019    CO2 27 11/06/2019    BUN 22 11/06/2019    CREATININE 0 81 11/06/2019    GLUF 93 11/06/2019    CALCIUM 9 5 11/06/2019    AST 20 11/06/2019    ALT 17 11/06/2019    ALKPHOS 154 (H) 11/06/2019    EGFR 69 11/06/2019     Lab Results   Component Value Date    WBC 4 41 11/06/2019    HGB 12 3 11/06/2019    HCT 39 7 11/06/2019    MCV 89 11/06/2019     11/06/2019     Lab Results   Component Value Date    NEUTROABS 1 85 11/06/2019

## 2019-11-07 NOTE — ASSESSMENT & PLAN NOTE
Recurrent stage IIIC ovarian cancer with elevated , left axillary lymph node and right breast metastasis  She is currently receiving doxil 30 mg/m2 and carboplatin AUC 5 every 28 days with avastin 10 mg/kg every 14 days  The patient tolerated her first cycle of treatment extremely well  Her  is declining  She was recently started on an anti-hypertensive  Continue with cycle 2 of treatment as planned  Her metabolic and hematologic parameters from 11/6/19 were reviewed, and are adequate for treatment  Return to the office as per her chemotherapy calendar

## 2019-11-08 NOTE — PLAN OF CARE
Problem: Potential for Falls  Goal: Patient will remain free of falls  Description  INTERVENTIONS:  - Assess patient frequently for physical needs  -  Identify cognitive and physical deficits and behaviors that affect risk of falls    -  [place] fall precautions as indicated by assessment   - Educate patient/family on patient safety including physical limitations  - Instruct patient to call for assistance with activity based on assessment  - Modify environment to reduce risk of injury  - Consider OT/PT consult to assist with strengthening/mobility  Outcome: Progressing

## 2019-12-05 PROBLEM — T50.905A HYPERTENSION DUE TO DRUG: Status: ACTIVE | Noted: 2019-01-01

## 2019-12-05 PROBLEM — I15.8 HYPERTENSION DUE TO DRUG: Status: ACTIVE | Noted: 2019-01-01

## 2019-12-05 NOTE — ASSESSMENT & PLAN NOTE
Avastin induced hypertension  Following closely with PCP  Will continue to monitor closely  Avastin to be held if BP greater than 140/90

## 2019-12-05 NOTE — ASSESSMENT & PLAN NOTE
Recurrent stage IIIC ovarian cancer with elevated , left axillary lymph node and right breast metastasis  She is currently receiving doxil 30 mg/m2 and carboplatin AUC 5 every 28 days with avastin 10 mg/kg every 14 days  Avastin has been intermittent held due to hypertension  She is tolerating treatment well       Continue with cycle 3 of treatment as planned  Her metabolic and hematologic parameters from 12/4/19 were reviewed, and are adequate for treatment       Plan for CT imaging after completion of cycle 3 to evaluate disease response  Pre-cycle 3  is pending  Return to the office as per her chemotherapy calendar

## 2019-12-05 NOTE — PROGRESS NOTES
Assessment/Plan:    Problem List Items Addressed This Visit        Endocrine    Ovarian cancer ([facility] ) - Primary     Recurrent stage IIIC ovarian cancer with elevated , left axillary lymph node and right breast metastasis  She is currently receiving doxil 30 mg/m2 and carboplatin AUC 5 every 28 days with avastin 10 mg/kg every 14 days  Avastin has been intermittent held due to hypertension  She is tolerating treatment well       Continue with cycle 3 of treatment as planned  Her metabolic and hematologic parameters from 12/4/19 were reviewed, and are adequate for treatment       Plan for CT imaging after completion of cycle 3 to evaluate disease response  Pre-cycle 3  is pending  Return to the office as per her chemotherapy calendar  Relevant Orders        CT chest abdomen pelvis w contrast       Cardiovascular and Mediastinum    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     Avastin induced hypertension  Following closely with PCP  Will continue to monitor closely  Avastin to be held if BP greater than 140/90  CHIEF COMPLAINT:   Pre-chemotherapy evaluation    Problem:  Cancer Staging  Ovarian cancer ([facility] ), Recurrent   Staging form: Ovary, Fallopian Tube, Primary Peritoneal, AJCC 8th Edition  - Clinical: ycT3c - Signed by Aidan Lugo MD on 12/6/2018        Previous therapy:     [facility]    8/16/2018 Biopsy     Paracentesis, metastatic adenocarcinoma, consistent with gyn primary       Chemotherapy     Neoadjuvant chemotherapy with taxol 80 mg/m2 weekly and carboplatin AUC 6 every 21 days  She received 3       10/11/2018 Genetic Testing     Adrienne Farias, wild-type  11/23/2018 Surgery     Diagnostic laparoscopy, exploratory laparotomy, modified radical hysterectomy, bilateral salpingo-oophorectomy, rectosigmoid resection with end-to-end reanastomosis, gastrocolic omentectomy, peritoneal resection  Residual tumor identified in the ovaries         - 1/25/2019 Chemotherapy Adjuvant chemotherapy, taxol 175 mg/m2 and carboplatin AUC 5 every 21 days  She received 3 cycles (total cycle 6)  Taxol was dose reduced to 135 mg/m2 with her final cycle due to neutropenia and neuropathy  [address] [facility] [place] Medicine testing suspended  Malignant cells were not >20% of content, unable to proceed with testing  Will require re-sampling in future  10/11/2019 -  Chemotherapy     Doxil 30 mg/m2 and carboplatin AUC 5 every 28 days, with avastin 10 mg/kg every 14 days  Avastin was held with day 1 of cycle 1 due to hypertension  The patient is scheduled for cycle 3 of treatment  Patient ID: Tanisha Manduajno is a [de-identified] y o  female  who presents to the office for pre-chemotherapy  The patient is tolerating treatment well  She is without acute complaints  The patient has been afebrile  She denies n/v/abdominal pain  Normal bowel/bladder function  The patient is without skin rashes, sores on her hands, feet or mouth  Her quality of life is good  Avastin has been intermittent held due to blood pressure elevation  She is following with her PCP  The following portions of the patient's history were reviewed and updated as appropriate: allergies, current medications, past medical history and problem list     Review of Systems   Constitutional: Negative  HENT: Negative  Eyes: Negative  Respiratory: Negative  Cardiovascular: Negative  Gastrointestinal: Negative  Genitourinary: Negative  Musculoskeletal: Negative  Skin: Negative  Neurological: Negative  Psychiatric/Behavioral: Negative          Current Outpatient Medications   Medication Sig Dispense Refill    hydrochlorothiazide (HYDRODIURIL) 12 5 mg tablet Take 12 5 mg by mouth daily as needed  3    losartan (COZAAR) 50 mg tablet Take 50 mg by mouth daily  3    LORazepam (ATIVAN) 1 mg tablet Take 1 tablet (1 mg total) by mouth every 8 (eight) hours as needed (nausea or anxiety) (Patient not taking: Reported on 11/7/2019) 20 tablet 1    ondansetron (ZOFRAN) 8 mg tablet Take 1 tablet (8 mg total) by mouth every 8 (eight) hours as needed for nausea or vomiting (Patient not taking: Reported on 11/7/2019) 20 tablet 1    rivaroxaban (XARELTO) 20 mg tablet Take 1 tablet (20 mg total) by mouth daily with breakfast (Patient not taking: Reported on 10/11/2019) 30 tablet 3     No current facility-administered medications for this visit  Objective:    Blood pressure 150/89, pulse 99, temperature 97 5 °F (36 4 °C), temperature source Oral, height 5' 2 01" (1 575 m), weight 76 4 kg (168 lb 6 4 oz)  Body mass index is 30 79 kg/m²  Body surface area is 1 78 meters squared  Physical Exam   Constitutional: She is oriented to person, place, and time  She appears well-developed and well-nourished  Pulmonary/Chest: Effort normal    Neurological: She is alert and oriented to person, place, and time  Skin: Skin is warm and dry  No rash noted  Psychiatric: She has a normal mood and affect  Her behavior is normal    Performance status is zero       Lab Results   Component Value Date     319 3 (H) 11/13/2019     Lab Results   Component Value Date    K 3 8 12/04/2019     12/04/2019    CO2 30 12/04/2019    BUN 25 12/04/2019    CREATININE 0 94 12/04/2019    GLUF 102 (H) 11/27/2019    CALCIUM 9 4 12/04/2019    AST 20 12/04/2019    ALT 17 12/04/2019    ALKPHOS 162 (H) 12/04/2019    EGFR 57 12/04/2019     Lab Results   Component Value Date    WBC 6 55 12/04/2019    HGB 12 4 12/04/2019    HCT 37 9 12/04/2019    MCV 88 12/04/2019     12/04/2019     Lab Results   Component Value Date    NEUTROABS 3 59 12/04/2019

## 2019-12-06 NOTE — PLAN OF CARE
Problem: Potential for Falls  Goal: Patient will remain free of falls  Description  INTERVENTIONS:  - Assess patient frequently for physical needs  -  Identify cognitive and physical deficits and behaviors that affect risk of falls  -  [place] fall precautions as indicated by assessment   - Educate patient/family on patient safety including physical limitations  - Instruct patient to call for assistance with activity based on assessment  - Modify environment to reduce risk of injury  - Consider OT/PT consult to assist with strengthening/mobility  Outcome: Progressing     Problem: DISCHARGE PLANNING  Goal: Discharge to home or other facility with appropriate resources  Description  INTERVENTIONS:  - Identify barriers to discharge w/patient and caregiver  - Arrange for needed discharge resources and transportation as appropriate  - Identify discharge learning needs (meds, wound care, etc )  - Arrange for interpretive services to assist at discharge as needed  - Refer to Case Management Department for coordinating discharge planning if the patient needs post-hospital services based on physician/advanced practitioner order or complex needs related to functional status, cognitive ability, or social support system  Outcome: Progressing     Problem: Knowledge Deficit  Goal: Patient/family/caregiver demonstrates understanding of disease process, treatment plan, medications, and discharge instructions  Description  Complete learning assessment and assess knowledge base  Interventions:  - Provide teaching at level of understanding  - Provide teaching via preferred learning methods  Outcome: Progressing     Problem: Potential for Falls  Goal: Patient will remain free of falls  Description  INTERVENTIONS:  - Assess patient frequently for physical needs  -  Identify cognitive and physical deficits and behaviors that affect risk of falls    -  [place] fall precautions as indicated by assessment   - Educate patient/family on patient safety including physical limitations  - Instruct patient to call for assistance with activity based on assessment  - Modify environment to reduce risk of injury  - Consider OT/PT consult to assist with strengthening/mobility  Outcome: Progressing

## 2019-12-20 NOTE — PLAN OF CARE
Problem: Potential for Falls  Goal: Patient will remain free of falls  Description  INTERVENTIONS:  - Assess patient frequently for physical needs  -  Identify cognitive and physical deficits and behaviors that affect risk of falls    -  [place] fall precautions as indicated by assessment   - Educate patient/family on patient safety including physical limitations  - Instruct patient to call for assistance with activity based on assessment  - Modify environment to reduce risk of injury  - Consider OT/PT consult to assist with strengthening/mobility  Outcome: Progressing

## 2020-01-01 ENCOUNTER — APPOINTMENT (OUTPATIENT)
Dept: LAB | Facility: HOSPITAL | Age: 80
End: 2020-01-01
Payer: MEDICARE

## 2020-01-01 ENCOUNTER — APPOINTMENT (EMERGENCY)
Dept: RADIOLOGY | Facility: HOSPITAL | Age: 80
DRG: 871 | End: 2020-01-01
Payer: MEDICARE

## 2020-01-01 ENCOUNTER — TRANSCRIBE ORDERS (OUTPATIENT)
Dept: ADMINISTRATIVE | Facility: HOSPITAL | Age: 80
End: 2020-01-01

## 2020-01-01 ENCOUNTER — TELEPHONE (OUTPATIENT)
Dept: OTHER | Facility: HOSPITAL | Age: 80
End: 2020-01-01

## 2020-01-01 ENCOUNTER — APPOINTMENT (INPATIENT)
Dept: RADIOLOGY | Facility: HOSPITAL | Age: 80
DRG: 291 | End: 2020-01-01
Payer: MEDICARE

## 2020-01-01 ENCOUNTER — HOSPITAL ENCOUNTER (OUTPATIENT)
Dept: INFUSION CENTER | Facility: HOSPITAL | Age: 80
Discharge: HOME | End: 2020-02-14
Attending: OBSTETRICS & GYNECOLOGY
Payer: MEDICARE

## 2020-01-01 ENCOUNTER — OFFICE VISIT (OUTPATIENT)
Dept: GYNECOLOGIC ONCOLOGY | Facility: CLINIC | Age: 80
End: 2020-01-01
Payer: MEDICARE

## 2020-01-01 ENCOUNTER — HOSPITAL ENCOUNTER (OUTPATIENT)
Dept: INFUSION CENTER | Facility: HOSPITAL | Age: 80
Discharge: HOME | End: 2020-03-25

## 2020-01-01 ENCOUNTER — APPOINTMENT (EMERGENCY)
Dept: RADIOLOGY | Facility: HOSPITAL | Age: 80
DRG: 291 | End: 2020-01-01
Payer: MEDICARE

## 2020-01-01 ENCOUNTER — DOCUMENTATION (OUTPATIENT)
Dept: RADIATION ONCOLOGY | Facility: HOSPITAL | Age: 80
End: 2020-01-01

## 2020-01-01 ENCOUNTER — NURSE TRIAGE (OUTPATIENT)
Dept: OTHER | Facility: OTHER | Age: 80
End: 2020-01-01

## 2020-01-01 ENCOUNTER — APPOINTMENT (OUTPATIENT)
Dept: LAB | Facility: CLINIC | Age: 80
End: 2020-01-01
Payer: MEDICARE

## 2020-01-01 ENCOUNTER — HOSPITAL ENCOUNTER (INPATIENT)
Facility: HOSPITAL | Age: 80
LOS: 3 days | DRG: 291 | End: 2020-04-09
Attending: EMERGENCY MEDICINE | Admitting: INTERNAL MEDICINE
Payer: MEDICARE

## 2020-01-01 ENCOUNTER — HOSPITAL ENCOUNTER (OUTPATIENT)
Dept: INFUSION CENTER | Facility: HOSPITAL | Age: 80
Discharge: HOME | End: 2020-01-31
Attending: OBSTETRICS & GYNECOLOGY
Payer: MEDICARE

## 2020-01-01 ENCOUNTER — APPOINTMENT (INPATIENT)
Dept: NON INVASIVE DIAGNOSTICS | Facility: HOSPITAL | Age: 80
DRG: 291 | End: 2020-01-01
Payer: MEDICARE

## 2020-01-01 ENCOUNTER — PATIENT OUTREACH (OUTPATIENT)
Dept: CASE MANAGEMENT | Facility: OTHER | Age: 80
End: 2020-01-01

## 2020-01-01 ENCOUNTER — HOSPITAL ENCOUNTER (OUTPATIENT)
Dept: RADIOLOGY | Facility: HOSPITAL | Age: 80
Discharge: HOME | End: 2020-02-25
Attending: OBSTETRICS & GYNECOLOGY
Payer: MEDICARE

## 2020-01-01 ENCOUNTER — HOSPITAL ENCOUNTER (OUTPATIENT)
Dept: INFUSION CENTER | Facility: HOSPITAL | Age: 80
Discharge: HOME | End: 2020-02-28
Attending: OBSTETRICS & GYNECOLOGY

## 2020-01-01 ENCOUNTER — DOCUMENTATION (OUTPATIENT)
Dept: INFUSION CENTER | Facility: HOSPITAL | Age: 80
End: 2020-01-01

## 2020-01-01 ENCOUNTER — HOSPITAL ENCOUNTER (INPATIENT)
Facility: HOSPITAL | Age: 80
LOS: 6 days | Discharge: HOME HEALTH | DRG: 871 | End: 2020-04-01
Attending: EMERGENCY MEDICINE | Admitting: FAMILY MEDICINE
Payer: MEDICARE

## 2020-01-01 ENCOUNTER — HOSPITAL ENCOUNTER (OUTPATIENT)
Dept: INFUSION CENTER | Facility: HOSPITAL | Age: 80
Discharge: HOME | End: 2020-03-18

## 2020-01-01 ENCOUNTER — HOSPITAL ENCOUNTER (OUTPATIENT)
Dept: INFUSION CENTER | Facility: HOSPITAL | Age: 80
Discharge: HOME | End: 2020-01-03
Attending: OBSTETRICS & GYNECOLOGY
Payer: MEDICARE

## 2020-01-01 ENCOUNTER — HOSPITAL ENCOUNTER (OUTPATIENT)
Dept: INFUSION CENTER | Facility: HOSPITAL | Age: 80
Discharge: HOME | End: 2020-01-17
Attending: OBSTETRICS & GYNECOLOGY

## 2020-01-01 VITALS
TEMPERATURE: 96.3 F | BODY MASS INDEX: 27.73 KG/M2 | RESPIRATION RATE: 18 BRPM | SYSTOLIC BLOOD PRESSURE: 138 MMHG | WEIGHT: 156.53 LBS | HEIGHT: 63 IN | OXYGEN SATURATION: 95 % | DIASTOLIC BLOOD PRESSURE: 90 MMHG | HEART RATE: 96 BPM

## 2020-01-01 VITALS
BODY MASS INDEX: 31.28 KG/M2 | TEMPERATURE: 97.7 F | HEART RATE: 107 BPM | WEIGHT: 170 LBS | SYSTOLIC BLOOD PRESSURE: 140 MMHG | HEIGHT: 62 IN | DIASTOLIC BLOOD PRESSURE: 90 MMHG

## 2020-01-01 VITALS
HEIGHT: 63 IN | WEIGHT: 157.63 LBS | DIASTOLIC BLOOD PRESSURE: 56 MMHG | OXYGEN SATURATION: 100 % | TEMPERATURE: 97.2 F | BODY MASS INDEX: 27.93 KG/M2 | HEART RATE: 111 BPM | SYSTOLIC BLOOD PRESSURE: 92 MMHG | RESPIRATION RATE: 31 BRPM

## 2020-01-01 VITALS
WEIGHT: 163.5 LBS | TEMPERATURE: 98.9 F | RESPIRATION RATE: 18 BRPM | SYSTOLIC BLOOD PRESSURE: 132 MMHG | HEART RATE: 109 BPM | HEIGHT: 62 IN | DIASTOLIC BLOOD PRESSURE: 84 MMHG | BODY MASS INDEX: 30.09 KG/M2

## 2020-01-01 VITALS
HEIGHT: 62 IN | TEMPERATURE: 98.2 F | HEART RATE: 112 BPM | WEIGHT: 159 LBS | BODY MASS INDEX: 29.26 KG/M2 | SYSTOLIC BLOOD PRESSURE: 142 MMHG | DIASTOLIC BLOOD PRESSURE: 90 MMHG

## 2020-01-01 VITALS
DIASTOLIC BLOOD PRESSURE: 64 MMHG | TEMPERATURE: 97.8 F | OXYGEN SATURATION: 96 % | WEIGHT: 165.57 LBS | BODY MASS INDEX: 30.47 KG/M2 | HEART RATE: 109 BPM | HEIGHT: 62 IN | SYSTOLIC BLOOD PRESSURE: 138 MMHG | RESPIRATION RATE: 18 BRPM

## 2020-01-01 VITALS
SYSTOLIC BLOOD PRESSURE: 160 MMHG | OXYGEN SATURATION: 98 % | DIASTOLIC BLOOD PRESSURE: 72 MMHG | RESPIRATION RATE: 16 BRPM | HEART RATE: 112 BPM | TEMPERATURE: 97.3 F

## 2020-01-01 VITALS
TEMPERATURE: 98 F | HEIGHT: 62 IN | SYSTOLIC BLOOD PRESSURE: 164 MMHG | WEIGHT: 166.5 LBS | HEART RATE: 111 BPM | BODY MASS INDEX: 30.64 KG/M2 | DIASTOLIC BLOOD PRESSURE: 92 MMHG

## 2020-01-01 VITALS
SYSTOLIC BLOOD PRESSURE: 134 MMHG | HEIGHT: 62 IN | RESPIRATION RATE: 20 BRPM | TEMPERATURE: 97.2 F | WEIGHT: 165.79 LBS | DIASTOLIC BLOOD PRESSURE: 70 MMHG | BODY MASS INDEX: 30.51 KG/M2 | HEART RATE: 97 BPM | OXYGEN SATURATION: 97 %

## 2020-01-01 VITALS
RESPIRATION RATE: 20 BRPM | DIASTOLIC BLOOD PRESSURE: 70 MMHG | WEIGHT: 168.65 LBS | HEART RATE: 107 BPM | BODY MASS INDEX: 31.04 KG/M2 | TEMPERATURE: 97.2 F | SYSTOLIC BLOOD PRESSURE: 140 MMHG | HEIGHT: 62 IN | OXYGEN SATURATION: 98 %

## 2020-01-01 DIAGNOSIS — J96.01 ACUTE RESPIRATORY FAILURE WITH HYPOXIA (HCC): ICD-10-CM

## 2020-01-01 DIAGNOSIS — D75.839 THROMBOCYTOSIS: ICD-10-CM

## 2020-01-01 DIAGNOSIS — C56.3 MALIGNANT NEOPLASM OF BOTH OVARIES (HCC): ICD-10-CM

## 2020-01-01 DIAGNOSIS — R93.89 ABNORMAL CT OF THE CHEST: ICD-10-CM

## 2020-01-01 DIAGNOSIS — I82.4Z2 DVT, LOWER EXTREMITY, DISTAL, ACUTE, LEFT (HCC): ICD-10-CM

## 2020-01-01 DIAGNOSIS — R09.02 HYPOXIA: ICD-10-CM

## 2020-01-01 DIAGNOSIS — R10.13 EPIGASTRIC PAIN: ICD-10-CM

## 2020-01-01 DIAGNOSIS — R92.8 ABNORMAL MAMMOGRAM OF RIGHT BREAST: ICD-10-CM

## 2020-01-01 DIAGNOSIS — N83.8 OVARIAN MASS, LEFT: Primary | ICD-10-CM

## 2020-01-01 DIAGNOSIS — K21.9 GERD (GASTROESOPHAGEAL REFLUX DISEASE): ICD-10-CM

## 2020-01-01 DIAGNOSIS — R91.8 PULMONARY INFILTRATES: ICD-10-CM

## 2020-01-01 DIAGNOSIS — T45.1X5A CHEMOTHERAPY INDUCED NEUTROPENIA (HCC): ICD-10-CM

## 2020-01-01 DIAGNOSIS — C56.9 MALIGNANT NEOPLASM OF OVARY, UNSPECIFIED LATERALITY (HCC): ICD-10-CM

## 2020-01-01 DIAGNOSIS — Z71.89 ADVANCED CARE PLANNING/COUNSELING DISCUSSION: ICD-10-CM

## 2020-01-01 DIAGNOSIS — N85.00 ENDOMETRIAL HYPERPLASIA: ICD-10-CM

## 2020-01-01 DIAGNOSIS — J90 PLEURAL EFFUSION: ICD-10-CM

## 2020-01-01 DIAGNOSIS — R06.00 DYSPNEA: Primary | ICD-10-CM

## 2020-01-01 DIAGNOSIS — T50.905A HYPERTENSION DUE TO DRUG: ICD-10-CM

## 2020-01-01 DIAGNOSIS — Z08 ENCOUNTER FOR FOLLOW-UP SURVEILLANCE OF OVARIAN CANCER: ICD-10-CM

## 2020-01-01 DIAGNOSIS — I50.21 ACUTE SYSTOLIC HEART FAILURE (HCC): ICD-10-CM

## 2020-01-01 DIAGNOSIS — Z85.43 ENCOUNTER FOR FOLLOW-UP SURVEILLANCE OF OVARIAN CANCER: ICD-10-CM

## 2020-01-01 DIAGNOSIS — A41.9 SEPSIS, DUE TO UNSPECIFIED ORGANISM, UNSPECIFIED WHETHER ACUTE ORGAN DYSFUNCTION PRESENT (HCC): ICD-10-CM

## 2020-01-01 DIAGNOSIS — N18.4 CHRONIC KIDNEY DISEASE, STAGE 4 (SEVERE) (HCC): ICD-10-CM

## 2020-01-01 DIAGNOSIS — I15.8 HYPERTENSION DUE TO DRUG: ICD-10-CM

## 2020-01-01 DIAGNOSIS — N83.8 OVARIAN MASS, LEFT: ICD-10-CM

## 2020-01-01 DIAGNOSIS — N17.9 AKI (ACUTE KIDNEY INJURY) (HCC): ICD-10-CM

## 2020-01-01 DIAGNOSIS — C56.9 MALIGNANT NEOPLASM OF OVARY, UNSPECIFIED LATERALITY (HCC): Primary | ICD-10-CM

## 2020-01-01 DIAGNOSIS — T45.1X5A CHEMOTHERAPY-INDUCED NEUROPATHY (HCC): ICD-10-CM

## 2020-01-01 DIAGNOSIS — R97.1 ELEVATED CA-125: ICD-10-CM

## 2020-01-01 DIAGNOSIS — Z98.890 POSTOPERATIVE STATE: ICD-10-CM

## 2020-01-01 DIAGNOSIS — G62.0 CHEMOTHERAPY-INDUCED NEUROPATHY (HCC): ICD-10-CM

## 2020-01-01 DIAGNOSIS — R91.1 PULMONARY NODULE: ICD-10-CM

## 2020-01-01 DIAGNOSIS — D70.1 CHEMOTHERAPY INDUCED NEUTROPENIA (HCC): ICD-10-CM

## 2020-01-01 DIAGNOSIS — C79.9 METASTATIC CANCER (HCC): ICD-10-CM

## 2020-01-01 DIAGNOSIS — R09.02 HYPOXIA: Primary | ICD-10-CM

## 2020-01-01 DIAGNOSIS — C56.3 MALIGNANT NEOPLASM OF BOTH OVARIES (HCC): Primary | ICD-10-CM

## 2020-01-01 DIAGNOSIS — I10 ESSENTIAL HYPERTENSION: ICD-10-CM

## 2020-01-01 DIAGNOSIS — R82.71 ASYMPTOMATIC BACTERIURIA: ICD-10-CM

## 2020-01-01 DIAGNOSIS — Z12.31 VISIT FOR SCREENING MAMMOGRAM: ICD-10-CM

## 2020-01-01 DIAGNOSIS — B37.9 CANDIDA INFECTION: ICD-10-CM

## 2020-01-01 DIAGNOSIS — Z90.49 S/P PARTIAL RESECTION OF COLON: ICD-10-CM

## 2020-01-01 DIAGNOSIS — R60.0 BILATERAL LEG EDEMA: ICD-10-CM

## 2020-01-01 DIAGNOSIS — R21 SKIN RASH: ICD-10-CM

## 2020-01-01 DIAGNOSIS — I50.9 CHF (CONGESTIVE HEART FAILURE) (HCC): ICD-10-CM

## 2020-01-01 DIAGNOSIS — E87.6 HYPOKALEMIA: ICD-10-CM

## 2020-01-01 DIAGNOSIS — Z90.710 STATUS POST ABDOMINAL HYSTERECTOMY: ICD-10-CM

## 2020-01-01 DIAGNOSIS — N17.9 ACUTE RENAL INJURY (HCC): ICD-10-CM

## 2020-01-01 LAB
ALBUMIN SERPL BCP-MCNC: 2 G/DL (ref 3.5–5)
ALBUMIN SERPL BCP-MCNC: 2.3 G/DL (ref 3.5–5)
ALBUMIN SERPL BCP-MCNC: 2.4 G/DL (ref 3.5–5)
ALBUMIN SERPL BCP-MCNC: 3 G/DL (ref 3.5–5)
ALBUMIN SERPL BCP-MCNC: 3.1 G/DL (ref 3.5–5)
ALBUMIN SERPL BCP-MCNC: 3.1 G/DL (ref 3.5–5)
ALBUMIN SERPL BCP-MCNC: 3.2 G/DL (ref 3.5–5)
ALP SERPL-CCNC: 168 U/L (ref 46–116)
ALP SERPL-CCNC: 192 U/L (ref 46–116)
ALP SERPL-CCNC: 193 U/L (ref 46–116)
ALP SERPL-CCNC: 223 U/L (ref 46–116)
ALP SERPL-CCNC: 412 U/L (ref 46–116)
ALP SERPL-CCNC: 467 U/L (ref 46–116)
ALP SERPL-CCNC: 729 U/L (ref 46–116)
ALT SERPL W P-5'-P-CCNC: 10 U/L (ref 12–78)
ALT SERPL W P-5'-P-CCNC: 13 U/L (ref 12–78)
ALT SERPL W P-5'-P-CCNC: 13 U/L (ref 12–78)
ALT SERPL W P-5'-P-CCNC: 17 U/L (ref 12–78)
ALT SERPL W P-5'-P-CCNC: 17 U/L (ref 12–78)
ALT SERPL W P-5'-P-CCNC: 24 U/L (ref 12–78)
ALT SERPL W P-5'-P-CCNC: 27 U/L (ref 12–78)
ANION GAP SERPL CALCULATED.3IONS-SCNC: 10 MMOL/L (ref 4–13)
ANION GAP SERPL CALCULATED.3IONS-SCNC: 12 MMOL/L (ref 4–13)
ANION GAP SERPL CALCULATED.3IONS-SCNC: 13 MMOL/L (ref 4–13)
ANION GAP SERPL CALCULATED.3IONS-SCNC: 17 MMOL/L (ref 4–13)
ANION GAP SERPL CALCULATED.3IONS-SCNC: 7 MMOL/L (ref 4–13)
ANION GAP SERPL CALCULATED.3IONS-SCNC: 8 MMOL/L (ref 4–13)
ANION GAP SERPL CALCULATED.3IONS-SCNC: 9 MMOL/L (ref 4–13)
APTT PPP: 113 SECONDS (ref 23–37)
APTT PPP: 151 SECONDS (ref 23–37)
APTT PPP: 174 SECONDS (ref 23–37)
APTT PPP: 24 SECONDS (ref 23–37)
APTT PPP: 28 SECONDS (ref 23–37)
APTT PPP: 28 SECONDS (ref 23–37)
APTT PPP: 92 SECONDS (ref 23–37)
AST SERPL W P-5'-P-CCNC: 12 U/L (ref 5–45)
AST SERPL W P-5'-P-CCNC: 16 U/L (ref 5–45)
AST SERPL W P-5'-P-CCNC: 18 U/L (ref 5–45)
AST SERPL W P-5'-P-CCNC: 35 U/L (ref 5–45)
AST SERPL W P-5'-P-CCNC: 36 U/L (ref 5–45)
AST SERPL W P-5'-P-CCNC: 40 U/L (ref 5–45)
AST SERPL W P-5'-P-CCNC: 9 U/L (ref 5–45)
ATRIAL RATE: 104 BPM
ATRIAL RATE: 104 BPM
ATRIAL RATE: 113 BPM
BACTERIA BLD CULT: NORMAL
BACTERIA BLD CULT: NORMAL
BACTERIA UR CULT: ABNORMAL
BACTERIA UR CULT: ABNORMAL
BACTERIA UR QL AUTO: ABNORMAL /HPF
BACTERIA UR QL AUTO: ABNORMAL /HPF
BASOPHILS # BLD AUTO: 0.01 THOUSANDS/ΜL (ref 0–0.1)
BASOPHILS # BLD AUTO: 0.02 THOUSANDS/ΜL (ref 0–0.1)
BASOPHILS # BLD AUTO: 0.02 THOUSANDS/ΜL (ref 0–0.1)
BASOPHILS # BLD AUTO: 0.03 THOUSANDS/ΜL (ref 0–0.1)
BASOPHILS # BLD AUTO: 0.04 THOUSANDS/ΜL (ref 0–0.1)
BASOPHILS # BLD AUTO: 0.05 THOUSANDS/ΜL (ref 0–0.1)
BASOPHILS NFR BLD AUTO: 0 % (ref 0–1)
BASOPHILS NFR BLD AUTO: 1 % (ref 0–1)
BASOPHILS NFR BLD AUTO: 1 % (ref 0–1)
BILIRUB SERPL-MCNC: 0.2 MG/DL (ref 0.2–1)
BILIRUB SERPL-MCNC: 0.3 MG/DL (ref 0.2–1)
BILIRUB SERPL-MCNC: 0.4 MG/DL (ref 0.2–1)
BILIRUB SERPL-MCNC: 0.4 MG/DL (ref 0.2–1)
BILIRUB SERPL-MCNC: 0.6 MG/DL (ref 0.2–1)
BILIRUB UR QL STRIP: ABNORMAL
BILIRUB UR QL STRIP: NEGATIVE
BUN SERPL-MCNC: 22 MG/DL (ref 5–25)
BUN SERPL-MCNC: 25 MG/DL (ref 5–25)
BUN SERPL-MCNC: 26 MG/DL (ref 5–25)
BUN SERPL-MCNC: 27 MG/DL (ref 5–25)
BUN SERPL-MCNC: 27 MG/DL (ref 5–25)
BUN SERPL-MCNC: 29 MG/DL (ref 5–25)
BUN SERPL-MCNC: 31 MG/DL (ref 5–25)
BUN SERPL-MCNC: 32 MG/DL (ref 5–25)
BUN SERPL-MCNC: 63 MG/DL (ref 5–25)
BUN SERPL-MCNC: 68 MG/DL (ref 5–25)
BUN SERPL-MCNC: 76 MG/DL (ref 5–25)
CALCIUM SERPL-MCNC: 8.3 MG/DL (ref 8.3–10.1)
CALCIUM SERPL-MCNC: 8.4 MG/DL (ref 8.3–10.1)
CALCIUM SERPL-MCNC: 8.6 MG/DL (ref 8.3–10.1)
CALCIUM SERPL-MCNC: 8.7 MG/DL (ref 8.3–10.1)
CALCIUM SERPL-MCNC: 8.8 MG/DL (ref 8.3–10.1)
CALCIUM SERPL-MCNC: 8.9 MG/DL (ref 8.3–10.1)
CALCIUM SERPL-MCNC: 9 MG/DL (ref 8.3–10.1)
CALCIUM SERPL-MCNC: 9.2 MG/DL (ref 8.3–10.1)
CANCER AG125 SERPL-ACNC: 288.3 U/ML (ref 0–30)
CANCER AG125 SERPL-ACNC: 369.6 U/ML (ref 0–30)
CANCER AG125 SERPL-ACNC: 378.7 U/ML (ref 0–30)
CANCER AG125 SERPL-ACNC: 404.7 U/ML (ref 0–30)
CANCER AG125 SERPL-ACNC: 524.6 U/ML (ref 0–30)
CHLORIDE SERPL-SCNC: 101 MMOL/L (ref 100–108)
CHLORIDE SERPL-SCNC: 102 MMOL/L (ref 100–108)
CHLORIDE SERPL-SCNC: 103 MMOL/L (ref 100–108)
CHLORIDE SERPL-SCNC: 104 MMOL/L (ref 100–108)
CHLORIDE SERPL-SCNC: 104 MMOL/L (ref 100–108)
CHLORIDE SERPL-SCNC: 105 MMOL/L (ref 100–108)
CHLORIDE SERPL-SCNC: 107 MMOL/L (ref 100–108)
CLARITY UR: ABNORMAL
CLARITY UR: ABNORMAL
CO2 SERPL-SCNC: 22 MMOL/L (ref 21–32)
CO2 SERPL-SCNC: 23 MMOL/L (ref 21–32)
CO2 SERPL-SCNC: 24 MMOL/L (ref 21–32)
CO2 SERPL-SCNC: 25 MMOL/L (ref 21–32)
CO2 SERPL-SCNC: 26 MMOL/L (ref 21–32)
CO2 SERPL-SCNC: 27 MMOL/L (ref 21–32)
CO2 SERPL-SCNC: 28 MMOL/L (ref 21–32)
CO2 SERPL-SCNC: 30 MMOL/L (ref 21–32)
COLOR UR: YELLOW
COLOR UR: YELLOW
CREAT SERPL-MCNC: 0.77 MG/DL (ref 0.6–1.3)
CREAT SERPL-MCNC: 0.84 MG/DL (ref 0.6–1.3)
CREAT SERPL-MCNC: 0.89 MG/DL (ref 0.6–1.3)
CREAT SERPL-MCNC: 0.98 MG/DL (ref 0.6–1.3)
CREAT SERPL-MCNC: 0.99 MG/DL (ref 0.6–1.3)
CREAT SERPL-MCNC: 0.99 MG/DL (ref 0.6–1.3)
CREAT SERPL-MCNC: 1.07 MG/DL (ref 0.6–1.3)
CREAT SERPL-MCNC: 1.15 MG/DL (ref 0.6–1.3)
CREAT SERPL-MCNC: 1.15 MG/DL (ref 0.6–1.3)
CREAT SERPL-MCNC: 1.18 MG/DL (ref 0.6–1.3)
CREAT SERPL-MCNC: 1.39 MG/DL (ref 0.6–1.3)
CREAT SERPL-MCNC: 1.97 MG/DL (ref 0.6–1.3)
CREAT SERPL-MCNC: 2.17 MG/DL (ref 0.6–1.3)
CREAT SERPL-MCNC: 2.3 MG/DL (ref 0.6–1.3)
CREAT UR-MCNC: 110 MG/DL
CREAT UR-MCNC: 259 MG/DL
CREAT UR-MCNC: 284 MG/DL
CREAT UR-MCNC: 293 MG/DL
CRP SERPL QL: 134 MG/L
CRP SERPL QL: 84.3 MG/L
D DIMER PPP FEU-MCNC: 1.06 UG/ML FEU
EOSINOPHIL # BLD AUTO: 0 THOUSAND/ΜL (ref 0–0.61)
EOSINOPHIL # BLD AUTO: 0 THOUSAND/ΜL (ref 0–0.61)
EOSINOPHIL # BLD AUTO: 0.01 THOUSAND/ΜL (ref 0–0.61)
EOSINOPHIL # BLD AUTO: 0.02 THOUSAND/ΜL (ref 0–0.61)
EOSINOPHIL # BLD AUTO: 0.02 THOUSAND/ΜL (ref 0–0.61)
EOSINOPHIL # BLD AUTO: 0.03 THOUSAND/ΜL (ref 0–0.61)
EOSINOPHIL # BLD AUTO: 0.03 THOUSAND/ΜL (ref 0–0.61)
EOSINOPHIL # BLD AUTO: 0.05 THOUSAND/ΜL (ref 0–0.61)
EOSINOPHIL # BLD AUTO: 0.05 THOUSAND/ΜL (ref 0–0.61)
EOSINOPHIL # BLD AUTO: 0.06 THOUSAND/ΜL (ref 0–0.61)
EOSINOPHIL # BLD AUTO: 0.08 THOUSAND/ΜL (ref 0–0.61)
EOSINOPHIL # BLD AUTO: 0.09 THOUSAND/ΜL (ref 0–0.61)
EOSINOPHIL NFR BLD AUTO: 0 % (ref 0–6)
EOSINOPHIL NFR BLD AUTO: 1 % (ref 0–6)
EOSINOPHIL NFR BLD AUTO: 2 % (ref 0–6)
ERYTHROCYTE [DISTWIDTH] IN BLOOD BY AUTOMATED COUNT: 14.6 % (ref 11.6–15.1)
ERYTHROCYTE [DISTWIDTH] IN BLOOD BY AUTOMATED COUNT: 14.6 % (ref 11.6–15.1)
ERYTHROCYTE [DISTWIDTH] IN BLOOD BY AUTOMATED COUNT: 14.8 % (ref 11.6–15.1)
ERYTHROCYTE [DISTWIDTH] IN BLOOD BY AUTOMATED COUNT: 15.8 % (ref 11.6–15.1)
ERYTHROCYTE [DISTWIDTH] IN BLOOD BY AUTOMATED COUNT: 15.9 % (ref 11.6–15.1)
ERYTHROCYTE [DISTWIDTH] IN BLOOD BY AUTOMATED COUNT: 16 % (ref 11.6–15.1)
ERYTHROCYTE [DISTWIDTH] IN BLOOD BY AUTOMATED COUNT: 16 % (ref 11.6–15.1)
ERYTHROCYTE [DISTWIDTH] IN BLOOD BY AUTOMATED COUNT: 16.4 % (ref 11.6–15.1)
ERYTHROCYTE [DISTWIDTH] IN BLOOD BY AUTOMATED COUNT: 16.4 % (ref 11.6–15.1)
ERYTHROCYTE [DISTWIDTH] IN BLOOD BY AUTOMATED COUNT: 16.5 % (ref 11.6–15.1)
ERYTHROCYTE [DISTWIDTH] IN BLOOD BY AUTOMATED COUNT: 18 % (ref 11.6–15.1)
ERYTHROCYTE [SEDIMENTATION RATE] IN BLOOD: 34 MM/HOUR (ref 2–25)
FLUAV RNA NPH QL NAA+PROBE: NORMAL
FLUBV RNA NPH QL NAA+PROBE: NORMAL
GFR SERPL CREATININE-BSD FRML MDRD: 19 ML/MIN/1.73SQ M
GFR SERPL CREATININE-BSD FRML MDRD: 21 ML/MIN/1.73SQ M
GFR SERPL CREATININE-BSD FRML MDRD: 23 ML/MIN/1.73SQ M
GFR SERPL CREATININE-BSD FRML MDRD: 36 ML/MIN/1.73SQ M
GFR SERPL CREATININE-BSD FRML MDRD: 44 ML/MIN/1.73SQ M
GFR SERPL CREATININE-BSD FRML MDRD: 45 ML/MIN/1.73SQ M
GFR SERPL CREATININE-BSD FRML MDRD: 45 ML/MIN/1.73SQ M
GFR SERPL CREATININE-BSD FRML MDRD: 49 ML/MIN/1.73SQ M
GFR SERPL CREATININE-BSD FRML MDRD: 54 ML/MIN/1.73SQ M
GFR SERPL CREATININE-BSD FRML MDRD: 54 ML/MIN/1.73SQ M
GFR SERPL CREATININE-BSD FRML MDRD: 55 ML/MIN/1.73SQ M
GFR SERPL CREATININE-BSD FRML MDRD: 61 ML/MIN/1.73SQ M
GFR SERPL CREATININE-BSD FRML MDRD: 66 ML/MIN/1.73SQ M
GFR SERPL CREATININE-BSD FRML MDRD: 73 ML/MIN/1.73SQ M
GLUCOSE P FAST SERPL-MCNC: 108 MG/DL (ref 65–99)
GLUCOSE SERPL-MCNC: 103 MG/DL (ref 65–140)
GLUCOSE SERPL-MCNC: 104 MG/DL (ref 65–140)
GLUCOSE SERPL-MCNC: 107 MG/DL (ref 65–140)
GLUCOSE SERPL-MCNC: 109 MG/DL (ref 65–140)
GLUCOSE SERPL-MCNC: 111 MG/DL (ref 65–140)
GLUCOSE SERPL-MCNC: 112 MG/DL (ref 65–140)
GLUCOSE SERPL-MCNC: 118 MG/DL (ref 65–140)
GLUCOSE SERPL-MCNC: 135 MG/DL (ref 65–140)
GLUCOSE SERPL-MCNC: 139 MG/DL (ref 65–140)
GLUCOSE SERPL-MCNC: 144 MG/DL (ref 65–140)
GLUCOSE SERPL-MCNC: 90 MG/DL (ref 65–140)
GLUCOSE SERPL-MCNC: 92 MG/DL (ref 65–140)
GLUCOSE SERPL-MCNC: 92 MG/DL (ref 65–140)
GLUCOSE SERPL-MCNC: 94 MG/DL (ref 65–140)
GLUCOSE UR STRIP-MCNC: NEGATIVE MG/DL
GLUCOSE UR STRIP-MCNC: NEGATIVE MG/DL
HCT VFR BLD AUTO: 35.3 % (ref 34.8–46.1)
HCT VFR BLD AUTO: 36.2 % (ref 34.8–46.1)
HCT VFR BLD AUTO: 36.8 % (ref 34.8–46.1)
HCT VFR BLD AUTO: 36.8 % (ref 34.8–46.1)
HCT VFR BLD AUTO: 37.6 % (ref 34.8–46.1)
HCT VFR BLD AUTO: 37.9 % (ref 34.8–46.1)
HCT VFR BLD AUTO: 38.5 % (ref 34.8–46.1)
HCT VFR BLD AUTO: 38.8 % (ref 34.8–46.1)
HCT VFR BLD AUTO: 39.1 % (ref 34.8–46.1)
HCT VFR BLD AUTO: 39.1 % (ref 34.8–46.1)
HCT VFR BLD AUTO: 39.2 % (ref 34.8–46.1)
HCT VFR BLD AUTO: 39.2 % (ref 34.8–46.1)
HCT VFR BLD AUTO: 40.3 % (ref 34.8–46.1)
HCT VFR BLD AUTO: 40.4 % (ref 34.8–46.1)
HCT VFR BLD AUTO: 47.4 % (ref 34.8–46.1)
HGB BLD-MCNC: 11.4 G/DL (ref 11.5–15.4)
HGB BLD-MCNC: 11.6 G/DL (ref 11.5–15.4)
HGB BLD-MCNC: 11.6 G/DL (ref 11.5–15.4)
HGB BLD-MCNC: 11.7 G/DL (ref 11.5–15.4)
HGB BLD-MCNC: 11.9 G/DL (ref 11.5–15.4)
HGB BLD-MCNC: 12.1 G/DL (ref 11.5–15.4)
HGB BLD-MCNC: 12.1 G/DL (ref 11.5–15.4)
HGB BLD-MCNC: 12.3 G/DL (ref 11.5–15.4)
HGB BLD-MCNC: 12.4 G/DL (ref 11.5–15.4)
HGB BLD-MCNC: 12.4 G/DL (ref 11.5–15.4)
HGB BLD-MCNC: 12.5 G/DL (ref 11.5–15.4)
HGB BLD-MCNC: 12.5 G/DL (ref 11.5–15.4)
HGB BLD-MCNC: 12.6 G/DL (ref 11.5–15.4)
HGB BLD-MCNC: 12.9 G/DL (ref 11.5–15.4)
HGB BLD-MCNC: 14.2 G/DL (ref 11.5–15.4)
HGB UR QL STRIP.AUTO: ABNORMAL
HGB UR QL STRIP.AUTO: ABNORMAL
IMM GRANULOCYTES # BLD AUTO: 0.01 THOUSAND/UL (ref 0–0.2)
IMM GRANULOCYTES # BLD AUTO: 0.02 THOUSAND/UL (ref 0–0.2)
IMM GRANULOCYTES # BLD AUTO: 0.02 THOUSAND/UL (ref 0–0.2)
IMM GRANULOCYTES # BLD AUTO: 0.03 THOUSAND/UL (ref 0–0.2)
IMM GRANULOCYTES # BLD AUTO: 0.04 THOUSAND/UL (ref 0–0.2)
IMM GRANULOCYTES # BLD AUTO: 0.04 THOUSAND/UL (ref 0–0.2)
IMM GRANULOCYTES # BLD AUTO: 0.05 THOUSAND/UL (ref 0–0.2)
IMM GRANULOCYTES # BLD AUTO: 0.07 THOUSAND/UL (ref 0–0.2)
IMM GRANULOCYTES # BLD AUTO: 0.07 THOUSAND/UL (ref 0–0.2)
IMM GRANULOCYTES # BLD AUTO: 0.08 THOUSAND/UL (ref 0–0.2)
IMM GRANULOCYTES # BLD AUTO: 0.09 THOUSAND/UL (ref 0–0.2)
IMM GRANULOCYTES # BLD AUTO: 0.16 THOUSAND/UL (ref 0–0.2)
IMM GRANULOCYTES # BLD AUTO: 0.25 THOUSAND/UL (ref 0–0.2)
IMM GRANULOCYTES # BLD AUTO: 0.32 THOUSAND/UL (ref 0–0.2)
IMM GRANULOCYTES NFR BLD AUTO: 0 % (ref 0–2)
IMM GRANULOCYTES NFR BLD AUTO: 1 % (ref 0–2)
IMM GRANULOCYTES NFR BLD AUTO: 2 % (ref 0–2)
IMM GRANULOCYTES NFR BLD AUTO: 2 % (ref 0–2)
INR PPP: 1.11 (ref 0.84–1.19)
INR PPP: 1.19 (ref 0.84–1.19)
INR PPP: 1.25 (ref 0.84–1.19)
KETONES UR STRIP-MCNC: ABNORMAL MG/DL
KETONES UR STRIP-MCNC: NEGATIVE MG/DL
L PNEUMO1 AG UR QL IA.RAPID: NEGATIVE
LACTATE SERPL-SCNC: 1.3 MMOL/L (ref 0.5–2)
LACTATE SERPL-SCNC: 1.8 MMOL/L (ref 0.5–2)
LACTATE SERPL-SCNC: 2.1 MMOL/L (ref 0.5–2)
LACTATE SERPL-SCNC: 2.2 MMOL/L (ref 0.5–2)
LACTATE SERPL-SCNC: 2.6 MMOL/L (ref 0.5–2)
LEUKOCYTE ESTERASE UR QL STRIP: ABNORMAL
LEUKOCYTE ESTERASE UR QL STRIP: ABNORMAL
LIPASE SERPL-CCNC: 70 U/L (ref 73–393)
LYMPHOCYTES # BLD AUTO: 0.94 THOUSANDS/ΜL (ref 0.6–4.47)
LYMPHOCYTES # BLD AUTO: 0.97 THOUSANDS/ΜL (ref 0.6–4.47)
LYMPHOCYTES # BLD AUTO: 1.06 THOUSANDS/ΜL (ref 0.6–4.47)
LYMPHOCYTES # BLD AUTO: 1.19 THOUSANDS/ΜL (ref 0.6–4.47)
LYMPHOCYTES # BLD AUTO: 1.2 THOUSANDS/ΜL (ref 0.6–4.47)
LYMPHOCYTES # BLD AUTO: 1.25 THOUSANDS/ΜL (ref 0.6–4.47)
LYMPHOCYTES # BLD AUTO: 1.26 THOUSANDS/ΜL (ref 0.6–4.47)
LYMPHOCYTES # BLD AUTO: 1.28 THOUSANDS/ΜL (ref 0.6–4.47)
LYMPHOCYTES # BLD AUTO: 1.35 THOUSANDS/ΜL (ref 0.6–4.47)
LYMPHOCYTES # BLD AUTO: 1.39 THOUSANDS/ΜL (ref 0.6–4.47)
LYMPHOCYTES # BLD AUTO: 1.4 THOUSANDS/ΜL (ref 0.6–4.47)
LYMPHOCYTES # BLD AUTO: 1.4 THOUSANDS/ΜL (ref 0.6–4.47)
LYMPHOCYTES # BLD AUTO: 1.46 THOUSANDS/ΜL (ref 0.6–4.47)
LYMPHOCYTES # BLD AUTO: 1.57 THOUSANDS/ΜL (ref 0.6–4.47)
LYMPHOCYTES NFR BLD AUTO: 10 % (ref 14–44)
LYMPHOCYTES NFR BLD AUTO: 12 % (ref 14–44)
LYMPHOCYTES NFR BLD AUTO: 12 % (ref 14–44)
LYMPHOCYTES NFR BLD AUTO: 13 % (ref 14–44)
LYMPHOCYTES NFR BLD AUTO: 18 % (ref 14–44)
LYMPHOCYTES NFR BLD AUTO: 24 % (ref 14–44)
LYMPHOCYTES NFR BLD AUTO: 27 % (ref 14–44)
LYMPHOCYTES NFR BLD AUTO: 32 % (ref 14–44)
LYMPHOCYTES NFR BLD AUTO: 5 % (ref 14–44)
LYMPHOCYTES NFR BLD AUTO: 6 % (ref 14–44)
LYMPHOCYTES NFR BLD AUTO: 7 % (ref 14–44)
LYMPHOCYTES NFR BLD AUTO: 9 % (ref 14–44)
MAGNESIUM SERPL-MCNC: 1.9 MG/DL (ref 1.6–2.6)
MAGNESIUM SERPL-MCNC: 2 MG/DL (ref 1.6–2.6)
MAGNESIUM SERPL-MCNC: 2.1 MG/DL (ref 1.6–2.6)
MAGNESIUM SERPL-MCNC: 2.5 MG/DL (ref 1.6–2.6)
MCH RBC QN AUTO: 29 PG (ref 26.8–34.3)
MCH RBC QN AUTO: 29.1 PG (ref 26.8–34.3)
MCH RBC QN AUTO: 29.1 PG (ref 26.8–34.3)
MCH RBC QN AUTO: 29.3 PG (ref 26.8–34.3)
MCH RBC QN AUTO: 29.4 PG (ref 26.8–34.3)
MCH RBC QN AUTO: 29.4 PG (ref 26.8–34.3)
MCH RBC QN AUTO: 29.6 PG (ref 26.8–34.3)
MCH RBC QN AUTO: 29.7 PG (ref 26.8–34.3)
MCH RBC QN AUTO: 29.7 PG (ref 26.8–34.3)
MCH RBC QN AUTO: 29.8 PG (ref 26.8–34.3)
MCH RBC QN AUTO: 29.9 PG (ref 26.8–34.3)
MCH RBC QN AUTO: 30.2 PG (ref 26.8–34.3)
MCH RBC QN AUTO: 30.4 PG (ref 26.8–34.3)
MCH RBC QN AUTO: 30.6 PG (ref 26.8–34.3)
MCH RBC QN AUTO: 31.1 PG (ref 26.8–34.3)
MCHC RBC AUTO-ENTMCNC: 30 G/DL (ref 31.4–37.4)
MCHC RBC AUTO-ENTMCNC: 31.2 G/DL (ref 31.4–37.4)
MCHC RBC AUTO-ENTMCNC: 31.4 G/DL (ref 31.4–37.4)
MCHC RBC AUTO-ENTMCNC: 31.4 G/DL (ref 31.4–37.4)
MCHC RBC AUTO-ENTMCNC: 31.5 G/DL (ref 31.4–37.4)
MCHC RBC AUTO-ENTMCNC: 31.6 G/DL (ref 31.4–37.4)
MCHC RBC AUTO-ENTMCNC: 31.7 G/DL (ref 31.4–37.4)
MCHC RBC AUTO-ENTMCNC: 31.8 G/DL (ref 31.4–37.4)
MCHC RBC AUTO-ENTMCNC: 31.9 G/DL (ref 31.4–37.4)
MCHC RBC AUTO-ENTMCNC: 31.9 G/DL (ref 31.4–37.4)
MCHC RBC AUTO-ENTMCNC: 32 G/DL (ref 31.4–37.4)
MCHC RBC AUTO-ENTMCNC: 32.3 G/DL (ref 31.4–37.4)
MCV RBC AUTO: 92 FL (ref 82–98)
MCV RBC AUTO: 93 FL (ref 82–98)
MCV RBC AUTO: 94 FL (ref 82–98)
MCV RBC AUTO: 94 FL (ref 82–98)
MCV RBC AUTO: 95 FL (ref 82–98)
MCV RBC AUTO: 96 FL (ref 82–98)
MCV RBC AUTO: 96 FL (ref 82–98)
MCV RBC AUTO: 98 FL (ref 82–98)
MONOCYTES # BLD AUTO: 0.63 THOUSAND/ΜL (ref 0.17–1.22)
MONOCYTES # BLD AUTO: 0.68 THOUSAND/ΜL (ref 0.17–1.22)
MONOCYTES # BLD AUTO: 0.78 THOUSAND/ΜL (ref 0.17–1.22)
MONOCYTES # BLD AUTO: 0.91 THOUSAND/ΜL (ref 0.17–1.22)
MONOCYTES # BLD AUTO: 0.96 THOUSAND/ΜL (ref 0.17–1.22)
MONOCYTES # BLD AUTO: 1.02 THOUSAND/ΜL (ref 0.17–1.22)
MONOCYTES # BLD AUTO: 1.06 THOUSAND/ΜL (ref 0.17–1.22)
MONOCYTES # BLD AUTO: 1.07 THOUSAND/ΜL (ref 0.17–1.22)
MONOCYTES # BLD AUTO: 1.23 THOUSAND/ΜL (ref 0.17–1.22)
MONOCYTES # BLD AUTO: 1.27 THOUSAND/ΜL (ref 0.17–1.22)
MONOCYTES # BLD AUTO: 1.28 THOUSAND/ΜL (ref 0.17–1.22)
MONOCYTES # BLD AUTO: 1.29 THOUSAND/ΜL (ref 0.17–1.22)
MONOCYTES # BLD AUTO: 1.31 THOUSAND/ΜL (ref 0.17–1.22)
MONOCYTES # BLD AUTO: 1.41 THOUSAND/ΜL (ref 0.17–1.22)
MONOCYTES NFR BLD AUTO: 10 % (ref 4–12)
MONOCYTES NFR BLD AUTO: 11 % (ref 4–12)
MONOCYTES NFR BLD AUTO: 11 % (ref 4–12)
MONOCYTES NFR BLD AUTO: 12 % (ref 4–12)
MONOCYTES NFR BLD AUTO: 13 % (ref 4–12)
MONOCYTES NFR BLD AUTO: 16 % (ref 4–12)
MONOCYTES NFR BLD AUTO: 17 % (ref 4–12)
MONOCYTES NFR BLD AUTO: 5 % (ref 4–12)
MONOCYTES NFR BLD AUTO: 6 % (ref 4–12)
MONOCYTES NFR BLD AUTO: 8 % (ref 4–12)
MONOCYTES NFR BLD AUTO: 9 % (ref 4–12)
MONOCYTES NFR BLD AUTO: 9 % (ref 4–12)
NEUTROPHILS # BLD AUTO: 1.98 THOUSANDS/ΜL (ref 1.85–7.62)
NEUTROPHILS # BLD AUTO: 10.48 THOUSANDS/ΜL (ref 1.85–7.62)
NEUTROPHILS # BLD AUTO: 10.72 THOUSANDS/ΜL (ref 1.85–7.62)
NEUTROPHILS # BLD AUTO: 11.88 THOUSANDS/ΜL (ref 1.85–7.62)
NEUTROPHILS # BLD AUTO: 14.52 THOUSANDS/ΜL (ref 1.85–7.62)
NEUTROPHILS # BLD AUTO: 14.53 THOUSANDS/ΜL (ref 1.85–7.62)
NEUTROPHILS # BLD AUTO: 18.23 THOUSANDS/ΜL (ref 1.85–7.62)
NEUTROPHILS # BLD AUTO: 3.25 THOUSANDS/ΜL (ref 1.85–7.62)
NEUTROPHILS # BLD AUTO: 3.29 THOUSANDS/ΜL (ref 1.85–7.62)
NEUTROPHILS # BLD AUTO: 6.26 THOUSANDS/ΜL (ref 1.85–7.62)
NEUTROPHILS # BLD AUTO: 6.98 THOUSANDS/ΜL (ref 1.85–7.62)
NEUTROPHILS # BLD AUTO: 7.49 THOUSANDS/ΜL (ref 1.85–7.62)
NEUTROPHILS # BLD AUTO: 7.87 THOUSANDS/ΜL (ref 1.85–7.62)
NEUTROPHILS # BLD AUTO: 9.86 THOUSANDS/ΜL (ref 1.85–7.62)
NEUTS SEG NFR BLD AUTO: 50 % (ref 43–75)
NEUTS SEG NFR BLD AUTO: 57 % (ref 43–75)
NEUTS SEG NFR BLD AUTO: 59 % (ref 43–75)
NEUTS SEG NFR BLD AUTO: 71 % (ref 43–75)
NEUTS SEG NFR BLD AUTO: 73 % (ref 43–75)
NEUTS SEG NFR BLD AUTO: 76 % (ref 43–75)
NEUTS SEG NFR BLD AUTO: 77 % (ref 43–75)
NEUTS SEG NFR BLD AUTO: 78 % (ref 43–75)
NEUTS SEG NFR BLD AUTO: 79 % (ref 43–75)
NEUTS SEG NFR BLD AUTO: 80 % (ref 43–75)
NEUTS SEG NFR BLD AUTO: 82 % (ref 43–75)
NEUTS SEG NFR BLD AUTO: 84 % (ref 43–75)
NEUTS SEG NFR BLD AUTO: 87 % (ref 43–75)
NEUTS SEG NFR BLD AUTO: 87 % (ref 43–75)
NITRITE UR QL STRIP: NEGATIVE
NITRITE UR QL STRIP: NEGATIVE
NON-SQ EPI CELLS URNS QL MICRO: ABNORMAL /HPF
NON-SQ EPI CELLS URNS QL MICRO: ABNORMAL /HPF
NRBC BLD AUTO-RTO: 0 /100 WBCS
NRBC BLD AUTO-RTO: 1 /100 WBCS
NT-PROBNP SERPL-MCNC: ABNORMAL PG/ML
P AXIS: 35 DEGREES
P AXIS: 58 DEGREES
P AXIS: 60 DEGREES
PH UR STRIP.AUTO: 5 [PH]
PH UR STRIP.AUTO: 6 [PH]
PLATELET # BLD AUTO: 118 THOUSANDS/UL (ref 149–390)
PLATELET # BLD AUTO: 155 THOUSANDS/UL (ref 149–390)
PLATELET # BLD AUTO: 165 THOUSANDS/UL (ref 149–390)
PLATELET # BLD AUTO: 191 THOUSANDS/UL (ref 149–390)
PLATELET # BLD AUTO: 192 THOUSANDS/UL (ref 149–390)
PLATELET # BLD AUTO: 192 THOUSANDS/UL (ref 149–390)
PLATELET # BLD AUTO: 214 THOUSANDS/UL (ref 149–390)
PLATELET # BLD AUTO: 220 THOUSANDS/UL (ref 149–390)
PLATELET # BLD AUTO: 223 THOUSANDS/UL (ref 149–390)
PLATELET # BLD AUTO: 233 THOUSANDS/UL (ref 149–390)
PLATELET # BLD AUTO: 235 THOUSANDS/UL (ref 149–390)
PLATELET # BLD AUTO: 279 THOUSANDS/UL (ref 149–390)
PLATELET # BLD AUTO: 279 THOUSANDS/UL (ref 149–390)
PLATELET # BLD AUTO: 98 THOUSANDS/UL (ref 149–390)
PLATELET # BLD AUTO: 99 THOUSANDS/UL (ref 149–390)
PMV BLD AUTO: 10.2 FL (ref 8.9–12.7)
PMV BLD AUTO: 10.2 FL (ref 8.9–12.7)
PMV BLD AUTO: 10.7 FL (ref 8.9–12.7)
PMV BLD AUTO: 10.7 FL (ref 8.9–12.7)
PMV BLD AUTO: 10.8 FL (ref 8.9–12.7)
PMV BLD AUTO: 10.8 FL (ref 8.9–12.7)
PMV BLD AUTO: 10.9 FL (ref 8.9–12.7)
PMV BLD AUTO: 12 FL (ref 8.9–12.7)
PMV BLD AUTO: 12.5 FL (ref 8.9–12.7)
PMV BLD AUTO: 13 FL (ref 8.9–12.7)
PMV BLD AUTO: 13.4 FL (ref 8.9–12.7)
PMV BLD AUTO: 9.3 FL (ref 8.9–12.7)
PMV BLD AUTO: 9.4 FL (ref 8.9–12.7)
PMV BLD AUTO: 9.4 FL (ref 8.9–12.7)
PMV BLD AUTO: 9.9 FL (ref 8.9–12.7)
POTASSIUM SERPL-SCNC: 3.3 MMOL/L (ref 3.5–5.3)
POTASSIUM SERPL-SCNC: 3.4 MMOL/L (ref 3.5–5.3)
POTASSIUM SERPL-SCNC: 3.5 MMOL/L (ref 3.5–5.3)
POTASSIUM SERPL-SCNC: 3.6 MMOL/L (ref 3.5–5.3)
POTASSIUM SERPL-SCNC: 3.6 MMOL/L (ref 3.5–5.3)
POTASSIUM SERPL-SCNC: 3.7 MMOL/L (ref 3.5–5.3)
POTASSIUM SERPL-SCNC: 3.8 MMOL/L (ref 3.5–5.3)
POTASSIUM SERPL-SCNC: 3.9 MMOL/L (ref 3.5–5.3)
POTASSIUM SERPL-SCNC: 4 MMOL/L (ref 3.5–5.3)
POTASSIUM SERPL-SCNC: 4.1 MMOL/L (ref 3.5–5.3)
POTASSIUM SERPL-SCNC: 4.2 MMOL/L (ref 3.5–5.3)
POTASSIUM SERPL-SCNC: 4.3 MMOL/L (ref 3.5–5.3)
PR INTERVAL: 156 MS
PR INTERVAL: 162 MS
PR INTERVAL: 168 MS
PROCALCITONIN SERPL-MCNC: 0.1 NG/ML
PROCALCITONIN SERPL-MCNC: 0.14 NG/ML
PROCALCITONIN SERPL-MCNC: 0.16 NG/ML
PROCALCITONIN SERPL-MCNC: 0.16 NG/ML
PROCALCITONIN SERPL-MCNC: 0.48 NG/ML
PROT SERPL-MCNC: 6.5 G/DL (ref 6.4–8.2)
PROT SERPL-MCNC: 7 G/DL (ref 6.4–8.2)
PROT SERPL-MCNC: 7.2 G/DL (ref 6.4–8.2)
PROT SERPL-MCNC: 7.4 G/DL (ref 6.4–8.2)
PROT SERPL-MCNC: 7.5 G/DL (ref 6.4–8.2)
PROT SERPL-MCNC: 7.6 G/DL (ref 6.4–8.2)
PROT SERPL-MCNC: 7.6 G/DL (ref 6.4–8.2)
PROT UR STRIP-MCNC: ABNORMAL MG/DL
PROT UR STRIP-MCNC: NEGATIVE MG/DL
PROT UR-MCNC: 23 MG/DL
PROT UR-MCNC: 26 MG/DL
PROT UR-MCNC: 27 MG/DL
PROT UR-MCNC: 31 MG/DL
PROT/CREAT UR: 0.08 MG/G{CREAT} (ref 0–0.1)
PROT/CREAT UR: 0.09 MG/G{CREAT} (ref 0–0.1)
PROT/CREAT UR: 0.1 MG/G{CREAT} (ref 0–0.1)
PROT/CREAT UR: 0.28 MG/G{CREAT} (ref 0–0.1)
PROTHROMBIN TIME: 14.7 SECONDS (ref 11.6–14.5)
PROTHROMBIN TIME: 15.5 SECONDS (ref 11.6–14.5)
PROTHROMBIN TIME: 16.1 SECONDS (ref 11.6–14.5)
QRS AXIS: -18 DEGREES
QRS AXIS: 109 DEGREES
QRS AXIS: 134 DEGREES
QRSD INTERVAL: 90 MS
QRSD INTERVAL: 92 MS
QRSD INTERVAL: 94 MS
QT INTERVAL: 292 MS
QT INTERVAL: 320 MS
QT INTERVAL: 348 MS
QTC INTERVAL: 400 MS
QTC INTERVAL: 420 MS
QTC INTERVAL: 457 MS
RBC # BLD AUTO: 3.67 MILLION/UL (ref 3.81–5.12)
RBC # BLD AUTO: 3.82 MILLION/UL (ref 3.81–5.12)
RBC # BLD AUTO: 3.87 MILLION/UL (ref 3.81–5.12)
RBC # BLD AUTO: 3.91 MILLION/UL (ref 3.81–5.12)
RBC # BLD AUTO: 3.95 MILLION/UL (ref 3.81–5.12)
RBC # BLD AUTO: 4.07 MILLION/UL (ref 3.81–5.12)
RBC # BLD AUTO: 4.09 MILLION/UL (ref 3.81–5.12)
RBC # BLD AUTO: 4.15 MILLION/UL (ref 3.81–5.12)
RBC # BLD AUTO: 4.22 MILLION/UL (ref 3.81–5.12)
RBC # BLD AUTO: 4.23 MILLION/UL (ref 3.81–5.12)
RBC # BLD AUTO: 4.23 MILLION/UL (ref 3.81–5.12)
RBC # BLD AUTO: 4.25 MILLION/UL (ref 3.81–5.12)
RBC # BLD AUTO: 4.33 MILLION/UL (ref 3.81–5.12)
RBC # BLD AUTO: 4.34 MILLION/UL (ref 3.81–5.12)
RBC # BLD AUTO: 4.83 MILLION/UL (ref 3.81–5.12)
RBC #/AREA URNS AUTO: ABNORMAL /HPF
RBC #/AREA URNS AUTO: ABNORMAL /HPF
RSV RNA NPH QL NAA+PROBE: NORMAL
S PNEUM AG UR QL: NEGATIVE
SARS-COV-2 RNA SPEC QL NAA+PROBE: NOT DETECTED
SODIUM SERPL-SCNC: 138 MMOL/L (ref 136–145)
SODIUM SERPL-SCNC: 139 MMOL/L (ref 136–145)
SODIUM SERPL-SCNC: 140 MMOL/L (ref 136–145)
SODIUM SERPL-SCNC: 141 MMOL/L (ref 136–145)
SP GR UR STRIP.AUTO: 1.02 (ref 1–1.03)
SP GR UR STRIP.AUTO: >=1.03 (ref 1–1.03)
T WAVE AXIS: 21 DEGREES
T WAVE AXIS: 32 DEGREES
T WAVE AXIS: 4 DEGREES
TROPONIN I SERPL-MCNC: 0.05 NG/ML
TROPONIN I SERPL-MCNC: 0.06 NG/ML
TROPONIN I SERPL-MCNC: 0.08 NG/ML
TROPONIN I SERPL-MCNC: 0.08 NG/ML
TROPONIN I SERPL-MCNC: 0.43 NG/ML
UROBILINOGEN UR QL STRIP.AUTO: 0.2 E.U./DL
UROBILINOGEN UR QL STRIP.AUTO: 0.2 E.U./DL
VENTRICULAR RATE: 104 BPM
VENTRICULAR RATE: 104 BPM
VENTRICULAR RATE: 113 BPM
WBC # BLD AUTO: 10.28 THOUSAND/UL (ref 4.31–10.16)
WBC # BLD AUTO: 12.04 THOUSAND/UL (ref 4.31–10.16)
WBC # BLD AUTO: 13.46 THOUSAND/UL (ref 4.31–10.16)
WBC # BLD AUTO: 13.54 THOUSAND/UL (ref 4.31–10.16)
WBC # BLD AUTO: 14.71 THOUSAND/UL (ref 4.31–10.16)
WBC # BLD AUTO: 16.52 THOUSAND/UL (ref 4.31–10.16)
WBC # BLD AUTO: 16.8 THOUSAND/UL (ref 4.31–10.16)
WBC # BLD AUTO: 17.26 THOUSAND/UL (ref 4.31–10.16)
WBC # BLD AUTO: 20.8 THOUSAND/UL (ref 4.31–10.16)
WBC # BLD AUTO: 3.94 THOUSAND/UL (ref 4.31–10.16)
WBC # BLD AUTO: 5.49 THOUSAND/UL (ref 4.31–10.16)
WBC # BLD AUTO: 5.74 THOUSAND/UL (ref 4.31–10.16)
WBC # BLD AUTO: 8.9 THOUSAND/UL (ref 4.31–10.16)
WBC # BLD AUTO: 9.67 THOUSAND/UL (ref 4.31–10.16)
WBC # BLD AUTO: 9.86 THOUSAND/UL (ref 4.31–10.16)
WBC #/AREA URNS AUTO: ABNORMAL /HPF
WBC #/AREA URNS AUTO: ABNORMAL /HPF

## 2020-01-01 PROCEDURE — 85025 COMPLETE CBC W/AUTO DIFF WBC: CPT | Performed by: STUDENT IN AN ORGANIZED HEALTH CARE EDUCATION/TRAINING PROGRAM

## 2020-01-01 PROCEDURE — 84156 ASSAY OF PROTEIN URINE: CPT

## 2020-01-01 PROCEDURE — 93005 ELECTROCARDIOGRAM TRACING: CPT

## 2020-01-01 PROCEDURE — 85027 COMPLETE CBC AUTOMATED: CPT | Performed by: FAMILY MEDICINE

## 2020-01-01 PROCEDURE — 85025 COMPLETE CBC W/AUTO DIFF WBC: CPT | Performed by: EMERGENCY MEDICINE

## 2020-01-01 PROCEDURE — 97167 OT EVAL HIGH COMPLEX 60 MIN: CPT

## 2020-01-01 PROCEDURE — 86304 IMMUNOASSAY TUMOR CA 125: CPT

## 2020-01-01 PROCEDURE — 85025 COMPLETE CBC W/AUTO DIFF WBC: CPT | Performed by: INTERNAL MEDICINE

## 2020-01-01 PROCEDURE — 36415 COLL VENOUS BLD VENIPUNCTURE: CPT | Performed by: PHYSICIAN ASSISTANT

## 2020-01-01 PROCEDURE — 71045 X-RAY EXAM CHEST 1 VIEW: CPT

## 2020-01-01 PROCEDURE — 83735 ASSAY OF MAGNESIUM: CPT

## 2020-01-01 PROCEDURE — 99232 SBSQ HOSP IP/OBS MODERATE 35: CPT | Performed by: INTERNAL MEDICINE

## 2020-01-01 PROCEDURE — 84145 PROCALCITONIN (PCT): CPT | Performed by: FAMILY MEDICINE

## 2020-01-01 PROCEDURE — 80048 BASIC METABOLIC PNL TOTAL CA: CPT | Performed by: STUDENT IN AN ORGANIZED HEALTH CARE EDUCATION/TRAINING PROGRAM

## 2020-01-01 PROCEDURE — 97163 PT EVAL HIGH COMPLEX 45 MIN: CPT

## 2020-01-01 PROCEDURE — 87635 SARS-COV-2 COVID-19 AMP PRB: CPT | Performed by: EMERGENCY MEDICINE

## 2020-01-01 PROCEDURE — 85610 PROTHROMBIN TIME: CPT | Performed by: EMERGENCY MEDICINE

## 2020-01-01 PROCEDURE — 36415 COLL VENOUS BLD VENIPUNCTURE: CPT | Performed by: EMERGENCY MEDICINE

## 2020-01-01 PROCEDURE — 99214 OFFICE O/P EST MOD 30 MIN: CPT | Performed by: OBSTETRICS & GYNECOLOGY

## 2020-01-01 PROCEDURE — 90662 IIV NO PRSV INCREASED AG IM: CPT | Performed by: STUDENT IN AN ORGANIZED HEALTH CARE EDUCATION/TRAINING PROGRAM

## 2020-01-01 PROCEDURE — 99239 HOSP IP/OBS DSCHRG MGMT >30: CPT | Performed by: PHYSICIAN ASSISTANT

## 2020-01-01 PROCEDURE — 97110 THERAPEUTIC EXERCISES: CPT

## 2020-01-01 PROCEDURE — C8929 TTE W OR WO FOL WCON,DOPPLER: HCPCS

## 2020-01-01 PROCEDURE — 93010 ELECTROCARDIOGRAM REPORT: CPT | Performed by: INTERNAL MEDICINE

## 2020-01-01 PROCEDURE — 96417 CHEMO IV INFUS EACH ADDL SEQ: CPT

## 2020-01-01 PROCEDURE — 83605 ASSAY OF LACTIC ACID: CPT | Performed by: EMERGENCY MEDICINE

## 2020-01-01 PROCEDURE — 96413 CHEMO IV INFUSION 1 HR: CPT

## 2020-01-01 PROCEDURE — 80053 COMPREHEN METABOLIC PANEL: CPT | Performed by: PHYSICIAN ASSISTANT

## 2020-01-01 PROCEDURE — 96374 THER/PROPH/DIAG INJ IV PUSH: CPT

## 2020-01-01 PROCEDURE — 80048 BASIC METABOLIC PNL TOTAL CA: CPT | Performed by: PHYSICIAN ASSISTANT

## 2020-01-01 PROCEDURE — 80053 COMPREHEN METABOLIC PANEL: CPT | Performed by: INTERNAL MEDICINE

## 2020-01-01 PROCEDURE — 99285 EMERGENCY DEPT VISIT HI MDM: CPT | Performed by: EMERGENCY MEDICINE

## 2020-01-01 PROCEDURE — 87449 NOS EACH ORGANISM AG IA: CPT | Performed by: PHYSICIAN ASSISTANT

## 2020-01-01 PROCEDURE — 99232 SBSQ HOSP IP/OBS MODERATE 35: CPT | Performed by: PHYSICIAN ASSISTANT

## 2020-01-01 PROCEDURE — 99222 1ST HOSP IP/OBS MODERATE 55: CPT | Performed by: PHYSICIAN ASSISTANT

## 2020-01-01 PROCEDURE — 71275 CT ANGIOGRAPHY CHEST: CPT

## 2020-01-01 PROCEDURE — 81001 URINALYSIS AUTO W/SCOPE: CPT | Performed by: EMERGENCY MEDICINE

## 2020-01-01 PROCEDURE — 93306 TTE W/DOPPLER COMPLETE: CPT | Performed by: INTERNAL MEDICINE

## 2020-01-01 PROCEDURE — 36415 COLL VENOUS BLD VENIPUNCTURE: CPT

## 2020-01-01 PROCEDURE — 99291 CRITICAL CARE FIRST HOUR: CPT

## 2020-01-01 PROCEDURE — 83690 ASSAY OF LIPASE: CPT | Performed by: INTERNAL MEDICINE

## 2020-01-01 PROCEDURE — 82570 ASSAY OF URINE CREATININE: CPT

## 2020-01-01 PROCEDURE — 85025 COMPLETE CBC W/AUTO DIFF WBC: CPT | Performed by: PHYSICIAN ASSISTANT

## 2020-01-01 PROCEDURE — 87040 BLOOD CULTURE FOR BACTERIA: CPT | Performed by: EMERGENCY MEDICINE

## 2020-01-01 PROCEDURE — 83735 ASSAY OF MAGNESIUM: CPT | Performed by: STUDENT IN AN ORGANIZED HEALTH CARE EDUCATION/TRAINING PROGRAM

## 2020-01-01 PROCEDURE — 86140 C-REACTIVE PROTEIN: CPT | Performed by: PHYSICIAN ASSISTANT

## 2020-01-01 PROCEDURE — 85025 COMPLETE CBC W/AUTO DIFF WBC: CPT

## 2020-01-01 PROCEDURE — 85730 THROMBOPLASTIN TIME PARTIAL: CPT | Performed by: FAMILY MEDICINE

## 2020-01-01 PROCEDURE — 85730 THROMBOPLASTIN TIME PARTIAL: CPT | Performed by: EMERGENCY MEDICINE

## 2020-01-01 PROCEDURE — 94760 N-INVAS EAR/PLS OXIMETRY 1: CPT

## 2020-01-01 PROCEDURE — 97535 SELF CARE MNGMENT TRAINING: CPT

## 2020-01-01 PROCEDURE — 99283 EMERGENCY DEPT VISIT LOW MDM: CPT | Performed by: EMERGENCY MEDICINE

## 2020-01-01 PROCEDURE — 84145 PROCALCITONIN (PCT): CPT | Performed by: PHYSICIAN ASSISTANT

## 2020-01-01 PROCEDURE — 74177 CT ABD & PELVIS W/CONTRAST: CPT

## 2020-01-01 PROCEDURE — 80053 COMPREHEN METABOLIC PANEL: CPT | Performed by: EMERGENCY MEDICINE

## 2020-01-01 PROCEDURE — 87631 RESP VIRUS 3-5 TARGETS: CPT | Performed by: EMERGENCY MEDICINE

## 2020-01-01 PROCEDURE — 96375 TX/PRO/DX INJ NEW DRUG ADDON: CPT

## 2020-01-01 PROCEDURE — 87086 URINE CULTURE/COLONY COUNT: CPT | Performed by: EMERGENCY MEDICINE

## 2020-01-01 PROCEDURE — 99223 1ST HOSP IP/OBS HIGH 75: CPT | Performed by: STUDENT IN AN ORGANIZED HEALTH CARE EDUCATION/TRAINING PROGRAM

## 2020-01-01 PROCEDURE — 99232 SBSQ HOSP IP/OBS MODERATE 35: CPT | Performed by: FAMILY MEDICINE

## 2020-01-01 PROCEDURE — 84484 ASSAY OF TROPONIN QUANT: CPT | Performed by: STUDENT IN AN ORGANIZED HEALTH CARE EDUCATION/TRAINING PROGRAM

## 2020-01-01 PROCEDURE — 99223 1ST HOSP IP/OBS HIGH 75: CPT | Performed by: INTERNAL MEDICINE

## 2020-01-01 PROCEDURE — 84484 ASSAY OF TROPONIN QUANT: CPT | Performed by: FAMILY MEDICINE

## 2020-01-01 PROCEDURE — 86304 IMMUNOASSAY TUMOR CA 125: CPT | Performed by: PHYSICIAN ASSISTANT

## 2020-01-01 PROCEDURE — 80053 COMPREHEN METABOLIC PANEL: CPT

## 2020-01-01 PROCEDURE — 84484 ASSAY OF TROPONIN QUANT: CPT | Performed by: NURSE PRACTITIONER

## 2020-01-01 PROCEDURE — 83880 ASSAY OF NATRIURETIC PEPTIDE: CPT | Performed by: EMERGENCY MEDICINE

## 2020-01-01 PROCEDURE — 99214 OFFICE O/P EST MOD 30 MIN: CPT | Performed by: PHYSICIAN ASSISTANT

## 2020-01-01 PROCEDURE — 84484 ASSAY OF TROPONIN QUANT: CPT | Performed by: EMERGENCY MEDICINE

## 2020-01-01 PROCEDURE — 80048 BASIC METABOLIC PNL TOTAL CA: CPT | Performed by: INTERNAL MEDICINE

## 2020-01-01 PROCEDURE — 71260 CT THORAX DX C+: CPT

## 2020-01-01 PROCEDURE — 86140 C-REACTIVE PROTEIN: CPT | Performed by: INTERNAL MEDICINE

## 2020-01-01 PROCEDURE — 99232 SBSQ HOSP IP/OBS MODERATE 35: CPT | Performed by: STUDENT IN AN ORGANIZED HEALTH CARE EDUCATION/TRAINING PROGRAM

## 2020-01-01 PROCEDURE — 96360 HYDRATION IV INFUSION INIT: CPT

## 2020-01-01 PROCEDURE — 82948 REAGENT STRIP/BLOOD GLUCOSE: CPT

## 2020-01-01 PROCEDURE — 87635 SARS-COV-2 COVID-19 AMP PRB: CPT | Performed by: FAMILY MEDICINE

## 2020-01-01 PROCEDURE — 96361 HYDRATE IV INFUSION ADD-ON: CPT

## 2020-01-01 PROCEDURE — 94660 CPAP INITIATION&MGMT: CPT

## 2020-01-01 PROCEDURE — 83735 ASSAY OF MAGNESIUM: CPT | Performed by: FAMILY MEDICINE

## 2020-01-01 PROCEDURE — 83605 ASSAY OF LACTIC ACID: CPT | Performed by: STUDENT IN AN ORGANIZED HEALTH CARE EDUCATION/TRAINING PROGRAM

## 2020-01-01 PROCEDURE — 96367 TX/PROPH/DG ADDL SEQ IV INF: CPT

## 2020-01-01 PROCEDURE — 85384 FIBRINOGEN ACTIVITY: CPT | Performed by: FAMILY MEDICINE

## 2020-01-01 PROCEDURE — 80048 BASIC METABOLIC PNL TOTAL CA: CPT | Performed by: FAMILY MEDICINE

## 2020-01-01 PROCEDURE — 85610 PROTHROMBIN TIME: CPT | Performed by: FAMILY MEDICINE

## 2020-01-01 PROCEDURE — 99239 HOSP IP/OBS DSCHRG MGMT >30: CPT | Performed by: STUDENT IN AN ORGANIZED HEALTH CARE EDUCATION/TRAINING PROGRAM

## 2020-01-01 PROCEDURE — G0008 ADMIN INFLUENZA VIRUS VAC: HCPCS | Performed by: STUDENT IN AN ORGANIZED HEALTH CARE EDUCATION/TRAINING PROGRAM

## 2020-01-01 PROCEDURE — 76770 US EXAM ABDO BACK WALL COMP: CPT

## 2020-01-01 PROCEDURE — 99213 OFFICE O/P EST LOW 20 MIN: CPT | Performed by: OBSTETRICS & GYNECOLOGY

## 2020-01-01 PROCEDURE — 94761 N-INVAS EAR/PLS OXIMETRY MLT: CPT

## 2020-01-01 PROCEDURE — 99285 EMERGENCY DEPT VISIT HI MDM: CPT

## 2020-01-01 PROCEDURE — 83735 ASSAY OF MAGNESIUM: CPT | Performed by: PHYSICIAN ASSISTANT

## 2020-01-01 PROCEDURE — 85652 RBC SED RATE AUTOMATED: CPT | Performed by: PHYSICIAN ASSISTANT

## 2020-01-01 PROCEDURE — 85379 FIBRIN DEGRADATION QUANT: CPT | Performed by: FAMILY MEDICINE

## 2020-01-01 PROCEDURE — 85025 COMPLETE CBC W/AUTO DIFF WBC: CPT | Performed by: FAMILY MEDICINE

## 2020-01-01 PROCEDURE — 85730 THROMBOPLASTIN TIME PARTIAL: CPT | Performed by: NURSE PRACTITIONER

## 2020-01-01 PROCEDURE — 84145 PROCALCITONIN (PCT): CPT | Performed by: INTERNAL MEDICINE

## 2020-01-01 RX ORDER — CALCIUM CARBONATE 200(500)MG
1000 TABLET,CHEWABLE ORAL DAILY PRN
Status: DISCONTINUED | OUTPATIENT
Start: 2020-01-01 | End: 2020-01-01 | Stop reason: HOSPADM

## 2020-01-01 RX ORDER — DEXTROSE MONOHYDRATE 50 MG/ML
20 INJECTION, SOLUTION INTRAVENOUS ONCE
Status: COMPLETED | OUTPATIENT
Start: 2020-01-01 | End: 2020-01-01

## 2020-01-01 RX ORDER — HEPARIN SODIUM 5000 [USP'U]/ML
5000 INJECTION, SOLUTION INTRAVENOUS; SUBCUTANEOUS EVERY 8 HOURS SCHEDULED
Status: DISCONTINUED | OUTPATIENT
Start: 2020-01-01 | End: 2020-01-01

## 2020-01-01 RX ORDER — AZITHROMYCIN 250 MG/1
250 TABLET, FILM COATED ORAL EVERY 24 HOURS
Status: COMPLETED | OUTPATIENT
Start: 2020-01-01 | End: 2020-01-01

## 2020-01-01 RX ORDER — FUROSEMIDE 10 MG/ML
10 SYRINGE (ML) INJECTION CONTINUOUS
Status: DISCONTINUED | OUTPATIENT
Start: 2020-01-01 | End: 2020-04-09 | Stop reason: HOSPADM

## 2020-01-01 RX ORDER — MILRINONE LACTATE 0.2 MG/ML
0.38 INJECTION, SOLUTION INTRAVENOUS CONTINUOUS
Status: DISCONTINUED | OUTPATIENT
Start: 2020-01-01 | End: 2020-04-09 | Stop reason: HOSPADM

## 2020-01-01 RX ORDER — FUROSEMIDE 10 MG/ML
40 INJECTION INTRAMUSCULAR; INTRAVENOUS ONCE
Status: COMPLETED | OUTPATIENT
Start: 2020-01-01 | End: 2020-01-01

## 2020-01-01 RX ORDER — AMLODIPINE BESYLATE 5 MG/1
5 TABLET ORAL DAILY
COMMUNITY
Start: 2020-01-01 | End: 2020-04-09 | Stop reason: HOSPADM

## 2020-01-01 RX ORDER — FAMOTIDINE 20 MG/1
20 TABLET, FILM COATED ORAL DAILY
Status: DISCONTINUED | OUTPATIENT
Start: 2020-01-01 | End: 2020-01-01 | Stop reason: HOSPADM

## 2020-01-01 RX ORDER — FUROSEMIDE 10 MG/ML
20 INJECTION INTRAMUSCULAR; INTRAVENOUS ONCE
Status: COMPLETED | OUTPATIENT
Start: 2020-01-01 | End: 2020-01-01

## 2020-01-01 RX ORDER — SODIUM CHLORIDE 9 MG/ML
20 INJECTION, SOLUTION INTRAVENOUS ONCE
Status: CANCELLED | OUTPATIENT
Start: 2020-01-01

## 2020-01-01 RX ORDER — HEPARIN SODIUM 5000 [USP'U]/ML
5000 INJECTION, SOLUTION INTRAVENOUS; SUBCUTANEOUS EVERY 12 HOURS SCHEDULED
Status: DISCONTINUED | OUTPATIENT
Start: 2020-01-01 | End: 2020-01-01 | Stop reason: HOSPADM

## 2020-01-01 RX ORDER — AMLODIPINE BESYLATE 5 MG/1
5 TABLET ORAL DAILY
Status: DISCONTINUED | OUTPATIENT
Start: 2020-01-01 | End: 2020-01-01

## 2020-01-01 RX ORDER — BENZONATATE 100 MG/1
100 CAPSULE ORAL 3 TIMES DAILY
Status: DISCONTINUED | OUTPATIENT
Start: 2020-01-01 | End: 2020-01-01

## 2020-01-01 RX ORDER — GUAIFENESIN 100 MG/5ML
200 SOLUTION ORAL EVERY 4 HOURS PRN
Qty: 118 ML | Refills: 0 | Status: SHIPPED | OUTPATIENT
Start: 2020-01-01 | End: 2020-04-09 | Stop reason: HOSPADM

## 2020-01-01 RX ORDER — FUROSEMIDE 10 MG/ML
60 INJECTION INTRAMUSCULAR; INTRAVENOUS
Status: DISCONTINUED | OUTPATIENT
Start: 2020-01-01 | End: 2020-01-01

## 2020-01-01 RX ORDER — GUAIFENESIN 100 MG/5ML
200 SOLUTION ORAL EVERY 4 HOURS PRN
Status: DISCONTINUED | OUTPATIENT
Start: 2020-01-01 | End: 2020-01-01 | Stop reason: HOSPADM

## 2020-01-01 RX ORDER — PALONOSETRON 0.05 MG/ML
0.25 INJECTION, SOLUTION INTRAVENOUS ONCE
Status: CANCELLED | OUTPATIENT
Start: 2020-01-01

## 2020-01-01 RX ORDER — SODIUM CHLORIDE 9 MG/ML
75 INJECTION, SOLUTION INTRAVENOUS CONTINUOUS
Status: DISCONTINUED | OUTPATIENT
Start: 2020-01-01 | End: 2020-01-01

## 2020-01-01 RX ORDER — FAMOTIDINE 20 MG/1
20 TABLET, FILM COATED ORAL DAILY
Status: DISCONTINUED | OUTPATIENT
Start: 2020-01-01 | End: 2020-04-09 | Stop reason: HOSPADM

## 2020-01-01 RX ORDER — GUAIFENESIN 100 MG/5ML
200 SOLUTION ORAL EVERY 4 HOURS PRN
Status: DISCONTINUED | OUTPATIENT
Start: 2020-01-01 | End: 2020-04-09 | Stop reason: HOSPADM

## 2020-01-01 RX ORDER — DEXTROSE MONOHYDRATE 50 MG/ML
20 INJECTION, SOLUTION INTRAVENOUS ONCE
Status: CANCELLED | OUTPATIENT
Start: 2020-01-01

## 2020-01-01 RX ORDER — SODIUM CHLORIDE 9 MG/ML
20 INJECTION, SOLUTION INTRAVENOUS ONCE
Status: COMPLETED | OUTPATIENT
Start: 2020-01-01 | End: 2020-01-01

## 2020-01-01 RX ORDER — BENZONATATE 100 MG/1
100 CAPSULE ORAL 3 TIMES DAILY PRN
Qty: 30 CAPSULE | Refills: 0 | Status: SHIPPED | OUTPATIENT
Start: 2020-01-01 | End: 2020-04-09 | Stop reason: HOSPADM

## 2020-01-01 RX ORDER — NYSTATIN 100000 U/G
CREAM TOPICAL 2 TIMES DAILY
Qty: 30 G | Refills: 0 | Status: SHIPPED | OUTPATIENT
Start: 2020-01-01 | End: 2020-04-09 | Stop reason: HOSPADM

## 2020-01-01 RX ORDER — HYDROMORPHONE HCL/PF 1 MG/ML
0.5 SYRINGE (ML) INJECTION ONCE
Status: COMPLETED | OUTPATIENT
Start: 2020-01-01 | End: 2020-01-01

## 2020-01-01 RX ORDER — AZITHROMYCIN 250 MG/1
500 TABLET, FILM COATED ORAL EVERY 24 HOURS
Status: DISCONTINUED | OUTPATIENT
Start: 2020-01-01 | End: 2020-01-01

## 2020-01-01 RX ORDER — CEFTRIAXONE 1 G/50ML
1000 INJECTION, SOLUTION INTRAVENOUS EVERY 24 HOURS
Status: DISCONTINUED | OUTPATIENT
Start: 2020-01-01 | End: 2020-01-01 | Stop reason: HOSPADM

## 2020-01-01 RX ORDER — PALONOSETRON 0.05 MG/ML
0.25 INJECTION, SOLUTION INTRAVENOUS ONCE
Status: COMPLETED | OUTPATIENT
Start: 2020-01-01 | End: 2020-01-01

## 2020-01-01 RX ORDER — CEFTRIAXONE 1 G/50ML
1000 INJECTION, SOLUTION INTRAVENOUS EVERY 24 HOURS
Status: DISCONTINUED | OUTPATIENT
Start: 2020-01-01 | End: 2020-01-01

## 2020-01-01 RX ORDER — ONDANSETRON 2 MG/ML
4 INJECTION INTRAMUSCULAR; INTRAVENOUS EVERY 6 HOURS PRN
Status: DISCONTINUED | OUTPATIENT
Start: 2020-01-01 | End: 2020-01-01 | Stop reason: HOSPADM

## 2020-01-01 RX ORDER — FAMOTIDINE 20 MG/1
20 TABLET, FILM COATED ORAL DAILY
Qty: 90 TABLET | Refills: 0 | Status: SHIPPED | OUTPATIENT
Start: 2020-01-01 | End: 2020-04-09 | Stop reason: HOSPADM

## 2020-01-01 RX ORDER — CEFPODOXIME PROXETIL 200 MG/1
200 TABLET, FILM COATED ORAL 2 TIMES DAILY
Status: COMPLETED | OUTPATIENT
Start: 2020-01-01 | End: 2020-01-01

## 2020-01-01 RX ORDER — LORAZEPAM 0.5 MG/1
0.5 TABLET ORAL EVERY 8 HOURS PRN
Status: DISCONTINUED | OUTPATIENT
Start: 2020-01-01 | End: 2020-04-09 | Stop reason: HOSPADM

## 2020-01-01 RX ORDER — FAMOTIDINE 20 MG/1
20 TABLET, FILM COATED ORAL DAILY
Status: DISCONTINUED | OUTPATIENT
Start: 2020-01-01 | End: 2020-01-01

## 2020-01-01 RX ORDER — CEFEPIME HYDROCHLORIDE 1 G/50ML
1000 INJECTION, SOLUTION INTRAVENOUS EVERY 12 HOURS
Status: DISCONTINUED | OUTPATIENT
Start: 2020-01-01 | End: 2020-01-01

## 2020-01-01 RX ORDER — POTASSIUM CHLORIDE 20 MEQ/1
40 TABLET, EXTENDED RELEASE ORAL ONCE
Status: COMPLETED | OUTPATIENT
Start: 2020-01-01 | End: 2020-01-01

## 2020-01-01 RX ORDER — BENZONATATE 100 MG/1
100 CAPSULE ORAL 3 TIMES DAILY PRN
Status: DISCONTINUED | OUTPATIENT
Start: 2020-01-01 | End: 2020-04-09 | Stop reason: HOSPADM

## 2020-01-01 RX ORDER — GUAIFENESIN 600 MG
600 TABLET, EXTENDED RELEASE 12 HR ORAL 2 TIMES DAILY
Status: DISCONTINUED | OUTPATIENT
Start: 2020-01-01 | End: 2020-01-01

## 2020-01-01 RX ORDER — NYSTATIN 100000 [USP'U]/G
POWDER TOPICAL 2 TIMES DAILY
Status: DISCONTINUED | OUTPATIENT
Start: 2020-01-01 | End: 2020-01-01

## 2020-01-01 RX ORDER — HEPARIN SODIUM 1000 [USP'U]/ML
2800 INJECTION, SOLUTION INTRAVENOUS; SUBCUTANEOUS
Status: DISCONTINUED | OUTPATIENT
Start: 2020-01-01 | End: 2020-01-01

## 2020-01-01 RX ORDER — POTASSIUM CHLORIDE 20 MEQ/1
20 TABLET, EXTENDED RELEASE ORAL ONCE
Status: COMPLETED | OUTPATIENT
Start: 2020-01-01 | End: 2020-01-01

## 2020-01-01 RX ORDER — NYSTATIN 100000 U/G
CREAM TOPICAL 2 TIMES DAILY
Status: DISCONTINUED | OUTPATIENT
Start: 2020-01-01 | End: 2020-01-01 | Stop reason: HOSPADM

## 2020-01-01 RX ORDER — ACETAMINOPHEN 325 MG/1
650 TABLET ORAL EVERY 6 HOURS PRN
Status: DISCONTINUED | OUTPATIENT
Start: 2020-01-01 | End: 2020-01-01 | Stop reason: HOSPADM

## 2020-01-01 RX ORDER — CEFPODOXIME PROXETIL 200 MG/1
200 TABLET, FILM COATED ORAL 2 TIMES DAILY
Qty: 10 TABLET | Refills: 0 | Status: SHIPPED | OUTPATIENT
Start: 2020-01-01 | End: 2020-04-09 | Stop reason: HOSPADM

## 2020-01-01 RX ORDER — HEPARIN SODIUM 1000 [USP'U]/ML
5600 INJECTION, SOLUTION INTRAVENOUS; SUBCUTANEOUS
Status: DISCONTINUED | OUTPATIENT
Start: 2020-01-01 | End: 2020-01-01

## 2020-01-01 RX ORDER — FUROSEMIDE 10 MG/ML
40 INJECTION INTRAMUSCULAR; INTRAVENOUS
Status: DISCONTINUED | OUTPATIENT
Start: 2020-01-01 | End: 2020-01-01

## 2020-01-01 RX ORDER — BENZONATATE 100 MG/1
100 CAPSULE ORAL 3 TIMES DAILY PRN
Status: DISCONTINUED | OUTPATIENT
Start: 2020-01-01 | End: 2020-01-01 | Stop reason: HOSPADM

## 2020-01-01 RX ORDER — FUROSEMIDE 10 MG/ML
40 INJECTION INTRAMUSCULAR; INTRAVENOUS DAILY
Status: DISCONTINUED | OUTPATIENT
Start: 2020-01-01 | End: 2020-01-01

## 2020-01-01 RX ORDER — HEPARIN SODIUM 10000 [USP'U]/100ML
3-30 INJECTION, SOLUTION INTRAVENOUS
Status: DISCONTINUED | OUTPATIENT
Start: 2020-01-01 | End: 2020-01-01

## 2020-01-01 RX ORDER — ONDANSETRON 2 MG/ML
4 INJECTION INTRAMUSCULAR; INTRAVENOUS EVERY 6 HOURS PRN
Status: DISCONTINUED | OUTPATIENT
Start: 2020-01-01 | End: 2020-04-09 | Stop reason: HOSPADM

## 2020-01-01 RX ADMIN — CEFPODOXIME PROXETIL 200 MG: 200 TABLET, FILM COATED ORAL at 18:16

## 2020-01-01 RX ADMIN — HEPARIN SODIUM 5000 UNITS: 5000 INJECTION INTRAVENOUS; SUBCUTANEOUS at 20:34

## 2020-01-01 RX ADMIN — HEPARIN SODIUM 5000 UNITS: 5000 INJECTION INTRAVENOUS; SUBCUTANEOUS at 21:37

## 2020-01-01 RX ADMIN — SODIUM CHLORIDE 20 ML/HR: 0.9 INJECTION, SOLUTION INTRAVENOUS at 09:47

## 2020-01-01 RX ADMIN — CEFTRIAXONE 1000 MG: 1 INJECTION, SOLUTION INTRAVENOUS at 10:07

## 2020-01-01 RX ADMIN — AZITHROMYCIN 500 MG: 500 TABLET, FILM COATED ORAL at 10:22

## 2020-01-01 RX ADMIN — HEPARIN SODIUM 5000 UNITS: 5000 INJECTION INTRAVENOUS; SUBCUTANEOUS at 10:47

## 2020-01-01 RX ADMIN — INFLUENZA A VIRUS A/MICHIGAN/45/2015 X-275 (H1N1) ANTIGEN (FORMALDEHYDE INACTIVATED), INFLUENZA A VIRUS A/SINGAPORE/INFIMH-16-0019/2016 IVR-186 (H3N2) ANTIGEN (FORMALDEHYDE INACTIVATED), AND INFLUENZA B VIRUS B/MARYLAND/15/2016 BX-69A (A B/COLORADO/6/2017-LIKE VIRUS) ANTIGEN (FORMALDEHYDE INACTIVATED) 0.5 ML: 60; 60; 60 INJECTION, SUSPENSION INTRAMUSCULAR at 20:04

## 2020-01-01 RX ADMIN — AMLODIPINE BESYLATE 5 MG: 5 TABLET ORAL at 08:08

## 2020-01-01 RX ADMIN — CEFTRIAXONE 1000 MG: 1 INJECTION, SOLUTION INTRAVENOUS at 10:49

## 2020-01-01 RX ADMIN — DOXORUBICIN HYDROCHLORIDE 53.4 MG: 2 INJECTABLE, LIPOSOMAL INTRAVENOUS at 11:13

## 2020-01-01 RX ADMIN — BENZONATATE 100 MG: 100 CAPSULE ORAL at 15:45

## 2020-01-01 RX ADMIN — GUAIFENESIN 200 MG: 100 SOLUTION ORAL at 20:10

## 2020-01-01 RX ADMIN — HEPARIN SODIUM 5000 UNITS: 5000 INJECTION INTRAVENOUS; SUBCUTANEOUS at 21:31

## 2020-01-01 RX ADMIN — AMLODIPINE BESYLATE 5 MG: 5 TABLET ORAL at 08:47

## 2020-01-01 RX ADMIN — SODIUM CHLORIDE 150 MG: 0.9 INJECTION, SOLUTION INTRAVENOUS at 10:26

## 2020-01-01 RX ADMIN — FUROSEMIDE 40 MG: 10 INJECTION, SOLUTION INTRAMUSCULAR; INTRAVENOUS at 08:47

## 2020-01-01 RX ADMIN — PALONOSETRON 0.25 MG: 0.05 INJECTION, SOLUTION INTRAVENOUS at 09:48

## 2020-01-01 RX ADMIN — DEXTROSE 20 ML/HR: 50 INJECTION, SOLUTION INTRAVENOUS at 10:37

## 2020-01-01 RX ADMIN — FUROSEMIDE 60 MG: 10 INJECTION, SOLUTION INTRAMUSCULAR; INTRAVENOUS at 05:05

## 2020-01-01 RX ADMIN — SODIUM CHLORIDE 20 ML/HR: 0.9 INJECTION, SOLUTION INTRAVENOUS at 09:40

## 2020-01-01 RX ADMIN — GUAIFENESIN 200 MG: 100 SOLUTION ORAL at 04:13

## 2020-01-01 RX ADMIN — NYSTATIN: 100000 CREAM TOPICAL at 14:23

## 2020-01-01 RX ADMIN — CARBOPLATIN 351.5 MG: 10 INJECTION, SOLUTION INTRAVENOUS at 11:44

## 2020-01-01 RX ADMIN — FUROSEMIDE 20 MG: 10 INJECTION, SOLUTION INTRAMUSCULAR; INTRAVENOUS at 17:14

## 2020-01-01 RX ADMIN — HEPARIN SODIUM 5000 UNITS: 5000 INJECTION INTRAVENOUS; SUBCUTANEOUS at 09:01

## 2020-01-01 RX ADMIN — DEXAMETHASONE SODIUM PHOSPHATE 20 MG: 10 INJECTION, SOLUTION INTRAMUSCULAR; INTRAVENOUS at 09:22

## 2020-01-01 RX ADMIN — FUROSEMIDE 40 MG: 10 INJECTION, SOLUTION INTRAMUSCULAR; INTRAVENOUS at 18:15

## 2020-01-01 RX ADMIN — GUAIFENESIN 200 MG: 100 SOLUTION ORAL at 20:34

## 2020-01-01 RX ADMIN — POTASSIUM CHLORIDE 20 MEQ: 1500 TABLET, EXTENDED RELEASE ORAL at 23:52

## 2020-01-01 RX ADMIN — BEVACIZUMAB 750 MG: 400 INJECTION, SOLUTION INTRAVENOUS at 09:47

## 2020-01-01 RX ADMIN — NYSTATIN: 100000 CREAM TOPICAL at 19:31

## 2020-01-01 RX ADMIN — HEPARIN SODIUM 5000 UNITS: 5000 INJECTION INTRAVENOUS; SUBCUTANEOUS at 10:08

## 2020-01-01 RX ADMIN — HEPARIN SODIUM 5000 UNITS: 5000 INJECTION INTRAVENOUS; SUBCUTANEOUS at 20:10

## 2020-01-01 RX ADMIN — PALONOSETRON 0.25 MG: 0.05 INJECTION, SOLUTION INTRAVENOUS at 10:24

## 2020-01-01 RX ADMIN — CEFTRIAXONE 1000 MG: 1 INJECTION, SOLUTION INTRAVENOUS at 08:08

## 2020-01-01 RX ADMIN — SODIUM CHLORIDE 20 ML/HR: 0.9 INJECTION, SOLUTION INTRAVENOUS at 09:22

## 2020-01-01 RX ADMIN — HEPARIN SODIUM 5000 UNITS: 5000 INJECTION INTRAVENOUS; SUBCUTANEOUS at 20:00

## 2020-01-01 RX ADMIN — CEFPODOXIME PROXETIL 200 MG: 200 TABLET, FILM COATED ORAL at 08:47

## 2020-01-01 RX ADMIN — AZITHROMYCIN 500 MG: 500 TABLET, FILM COATED ORAL at 10:48

## 2020-01-01 RX ADMIN — BEVACIZUMAB 800 MG: 400 INJECTION, SOLUTION INTRAVENOUS at 12:50

## 2020-01-01 RX ADMIN — FAMOTIDINE 20 MG: 20 TABLET ORAL at 08:47

## 2020-01-01 RX ADMIN — PERFLUTREN 2 ML/MIN: 6.52 INJECTION, SUSPENSION INTRAVENOUS at 13:25

## 2020-01-01 RX ADMIN — BEVACIZUMAB 800 MG: 400 INJECTION, SOLUTION INTRAVENOUS at 13:53

## 2020-01-01 RX ADMIN — SODIUM CHLORIDE 75 ML/HR: 0.9 INJECTION, SOLUTION INTRAVENOUS at 21:37

## 2020-01-01 RX ADMIN — HEPARIN SODIUM AND DEXTROSE 18 UNITS/KG/HR: 10000; 5 INJECTION INTRAVENOUS at 14:49

## 2020-01-01 RX ADMIN — MAGNESIUM HYDROXIDE 30 ML: 400 SUSPENSION ORAL at 20:10

## 2020-01-01 RX ADMIN — AZITHROMYCIN 250 MG: 250 TABLET, FILM COATED ORAL at 09:09

## 2020-01-01 RX ADMIN — SODIUM CHLORIDE 1000 ML: 0.9 INJECTION, SOLUTION INTRAVENOUS at 12:52

## 2020-01-01 RX ADMIN — FAMOTIDINE 20 MG: 10 INJECTION, SOLUTION INTRAVENOUS at 09:10

## 2020-01-01 RX ADMIN — FUROSEMIDE 60 MG: 10 INJECTION, SOLUTION INTRAMUSCULAR; INTRAVENOUS at 12:28

## 2020-01-01 RX ADMIN — POTASSIUM CHLORIDE 40 MEQ: 1500 TABLET, EXTENDED RELEASE ORAL at 09:33

## 2020-01-01 RX ADMIN — MAGNESIUM HYDROXIDE 30 ML: 400 SUSPENSION ORAL at 05:38

## 2020-01-01 RX ADMIN — POTASSIUM CHLORIDE 40 MEQ: 1500 TABLET, EXTENDED RELEASE ORAL at 10:48

## 2020-01-01 RX ADMIN — HEPARIN SODIUM 5000 UNITS: 5000 INJECTION INTRAVENOUS; SUBCUTANEOUS at 08:08

## 2020-01-01 RX ADMIN — AMLODIPINE BESYLATE 5 MG: 5 TABLET ORAL at 09:10

## 2020-01-01 RX ADMIN — AZITHROMYCIN 500 MG: 250 TABLET, FILM COATED ORAL at 08:08

## 2020-01-01 RX ADMIN — FAMOTIDINE 20 MG: 20 TABLET ORAL at 09:28

## 2020-01-01 RX ADMIN — HEPARIN SODIUM 5000 UNITS: 5000 INJECTION INTRAVENOUS; SUBCUTANEOUS at 08:03

## 2020-01-01 RX ADMIN — DOXORUBICIN HYDROCHLORIDE 53.4 MG: 2 INJECTABLE, LIPOSOMAL INTRAVENOUS at 10:38

## 2020-01-01 RX ADMIN — HYDROMORPHONE HYDROCHLORIDE 0.5 MG: 1 INJECTION, SOLUTION INTRAMUSCULAR; INTRAVENOUS; SUBCUTANEOUS at 21:20

## 2020-01-01 RX ADMIN — CEFTRIAXONE 1000 MG: 1 INJECTION, SOLUTION INTRAVENOUS at 09:09

## 2020-01-01 RX ADMIN — FUROSEMIDE 40 MG: 10 INJECTION, SOLUTION INTRAMUSCULAR; INTRAVENOUS at 16:07

## 2020-01-01 RX ADMIN — HEPARIN SODIUM 5000 UNITS: 5000 INJECTION INTRAVENOUS; SUBCUTANEOUS at 21:28

## 2020-01-01 RX ADMIN — SODIUM CHLORIDE 75 ML/HR: 0.9 INJECTION, SOLUTION INTRAVENOUS at 11:43

## 2020-01-01 RX ADMIN — AZITHROMYCIN 500 MG: 500 TABLET, FILM COATED ORAL at 10:38

## 2020-01-01 RX ADMIN — NYSTATIN: 100000 CREAM TOPICAL at 10:23

## 2020-01-01 RX ADMIN — FAMOTIDINE 20 MG: 10 INJECTION, SOLUTION INTRAVENOUS at 10:07

## 2020-01-01 RX ADMIN — FAMOTIDINE 20 MG: 20 TABLET, FILM COATED ORAL at 16:20

## 2020-01-01 RX ADMIN — APIXABAN 2.5 MG: 2.5 TABLET, FILM COATED ORAL at 18:24

## 2020-01-01 RX ADMIN — BENZONATATE 100 MG: 100 CAPSULE ORAL at 16:19

## 2020-01-01 RX ADMIN — AZITHROMYCIN 500 MG: 250 TABLET, FILM COATED ORAL at 08:02

## 2020-01-01 RX ADMIN — MAGNESIUM HYDROXIDE 30 ML: 400 SUSPENSION ORAL at 17:07

## 2020-01-01 RX ADMIN — SODIUM CHLORIDE 75 ML/HR: 0.9 INJECTION, SOLUTION INTRAVENOUS at 20:50

## 2020-01-01 RX ADMIN — IOHEXOL 100 ML: 350 INJECTION, SOLUTION INTRAVENOUS at 11:37

## 2020-01-01 RX ADMIN — NYSTATIN: 100000 CREAM TOPICAL at 10:49

## 2020-01-01 RX ADMIN — MILRINONE LACTATE IN DEXTROSE 0.38 MCG/KG/MIN: 200 INJECTION, SOLUTION INTRAVENOUS at 16:18

## 2020-01-01 RX ADMIN — HEPARIN SODIUM 5000 UNITS: 5000 INJECTION INTRAVENOUS; SUBCUTANEOUS at 09:10

## 2020-01-01 RX ADMIN — CEFTRIAXONE 1000 MG: 1 INJECTION, SOLUTION INTRAVENOUS at 08:02

## 2020-01-01 RX ADMIN — NYSTATIN: 100000 CREAM TOPICAL at 19:01

## 2020-01-01 RX ADMIN — DEXAMETHASONE SODIUM PHOSPHATE 20 MG: 10 INJECTION, SOLUTION INTRAMUSCULAR; INTRAVENOUS at 09:45

## 2020-01-01 RX ADMIN — FAMOTIDINE 20 MG: 10 INJECTION, SOLUTION INTRAVENOUS at 10:48

## 2020-01-01 RX ADMIN — CEFTRIAXONE 1000 MG: 1 INJECTION, SOLUTION INTRAVENOUS at 10:22

## 2020-01-01 RX ADMIN — NYSTATIN: 100000 CREAM TOPICAL at 10:08

## 2020-01-01 RX ADMIN — FUROSEMIDE 40 MG: 10 INJECTION, SOLUTION INTRAMUSCULAR; INTRAVENOUS at 12:20

## 2020-01-01 RX ADMIN — IOHEXOL 50 ML: 240 INJECTION, SOLUTION INTRATHECAL; INTRAVASCULAR; INTRAVENOUS; ORAL at 11:37

## 2020-01-01 RX ADMIN — AMLODIPINE BESYLATE 5 MG: 5 TABLET ORAL at 08:02

## 2020-01-01 RX ADMIN — FAMOTIDINE 20 MG: 10 INJECTION, SOLUTION INTRAVENOUS at 09:01

## 2020-01-01 RX ADMIN — CARBOPLATIN 337.5 MG: 10 INJECTION, SOLUTION INTRAVENOUS at 12:42

## 2020-01-01 RX ADMIN — HEPARIN SODIUM 5000 UNITS: 5000 INJECTION INTRAVENOUS; SUBCUTANEOUS at 05:37

## 2020-01-01 RX ADMIN — DEXTROSE 20 ML/HR: 50 INJECTION, SOLUTION INTRAVENOUS at 11:13

## 2020-01-01 RX ADMIN — SODIUM CHLORIDE 150 MG: 0.9 INJECTION, SOLUTION INTRAVENOUS at 10:03

## 2020-01-01 RX ADMIN — IOHEXOL 85 ML: 350 INJECTION, SOLUTION INTRAVENOUS at 16:14

## 2020-01-01 RX ADMIN — HEPARIN SODIUM 5000 UNITS: 5000 INJECTION INTRAVENOUS; SUBCUTANEOUS at 20:43

## 2020-01-02 NOTE — PROGRESS NOTES
Assessment/Plan:    Problem List Items Addressed This Visit        Endocrine    [facility]ian [facility] ([facility] ) - Primary     Recurrent stage IIIC ovarian cancer with elevated , left axillary lymph node and right breast metastasis  She is currently receiving doxil 30 mg/m2 and carboplatin AUC 5 every 28 days with avastin 10 mg/kg every 14 days  Avastin has been intermittently held due to hypertension  She is tolerating treatment well  She is having a biochemical response  Her  prior to cycle 4 is pending, but decreased from 1092 to 250  CT imaging after cycle 3 of treatment noted slight enlargement in axillary and subpectoral LNs, as well as several new pulmonary densities  CT imaging,  and treatment plan reviewed with Dr Liam Patel  Will plan to continue with current regimen and closely monitor        Continue with cycle 4 of treatment as planned  Her metabolic and hematologic parameters from 12/31/19 were reviewed, and are adequate for treatment       Return to the office as per her chemotherapy calendar  Relevant Orders    Infusion Calculated Appointment Request    Infusion Appointment Request    Infusion Calculated Appointment Request    Infusion Appointment Request    Infusion Calculated Appointment Request    Infusion Appointment Request     (Completed)            CHIEF COMPLAINT:   Pre-chemotherapy evaluation    Problem:  Cancer Staging  Ovarian cancer ([facility] ), Recurrent  Staging form: Ovary, Fallopian Tube, Primary Peritoneal, AJCC 8th Edition  - Clinical: ycT3c - Signed by Mango Frances MD on 12/6/2018        Previous therapy:     Ovarian cancer ([facility] )    8/16/2018 Biopsy     Paracentesis, metastatic adenocarcinoma, consistent with gyn primary       Chemotherapy     Neoadjuvant chemotherapy with taxol 80 mg/m2 weekly and carboplatin AUC 6 every 21 days  She received 3       10/11/2018 Genetic Testing     Joe Mccain, wild-type        11/23/2018 Surgery     Diagnostic laparoscopy, exploratory laparotomy, modified radical hysterectomy, bilateral salpingo-oophorectomy, rectosigmoid resection with end-to-end reanastomosis, gastrocolic omentectomy, peritoneal resection  Residual tumor identified in the ovaries  - 1/25/2019 Chemotherapy     Adjuvant chemotherapy, taxol 175 mg/m2 and carboplatin AUC 5 every 21 days  She received 3 cycles (total cycle 6)  Taxol was dose reduced to 135 mg/m2 with her final cycle due to neutropenia and neuropathy  [address] Medicine testing suspended  Malignant cells were not >20% of content, unable to proceed with testing  Will require re-sampling in future  10/11/2019 -  Chemotherapy     Doxil 30 mg/m2 and carboplatin AUC 5 every 28 days, with avastin 10 mg/kg every 14 days  Avastin was held with day 1 of cycle 1 due to hypertension  The patient is scheduled for cycle 4 of treatment  Patient ID: Nelwyn Sic is a [de-identified] y o  female  who presents to the office for pre-chemotherapy evaluation  The patient is tolerating treatment well  She has been afebrile  Patient denies n/v/abdominal pain  Appetite is appropriate  She is voiding and moving her bowels without difficulty  She denies sores on her hands, feet or mouth  Her quality of life is good  The following portions of the patient's history were reviewed and updated as appropriate: allergies, current medications, past medical history and problem list     Review of Systems   Constitutional: Negative  HENT: Negative  Eyes: Negative  Respiratory: Negative  Cardiovascular: Negative  Gastrointestinal: Negative  Genitourinary: Negative  Musculoskeletal: Negative  Skin: Negative  Neurological: Negative  Psychiatric/Behavioral: Negative          Current Outpatient Medications   Medication Sig Dispense Refill    hydrochlorothiazide (HYDRODIURIL) 12 5 mg tablet Take 12 5 mg by mouth daily as needed  3    losartan (COZAAR) 50 mg tablet Take 50 mg by mouth daily  3    LORazepam (ATIVAN) 1 mg tablet Take 1 tablet (1 mg total) by mouth every 8 (eight) hours as needed (nausea or anxiety) (Patient not taking: Reported on 11/7/2019) 20 tablet 1    ondansetron (ZOFRAN) 8 mg tablet Take 1 tablet (8 mg total) by mouth every 8 (eight) hours as needed for nausea or vomiting (Patient not taking: Reported on 11/7/2019) 20 tablet 1    rivaroxaban (XARELTO) 20 mg tablet Take 1 tablet (20 mg total) by mouth daily with breakfast (Patient not taking: Reported on 10/11/2019) 30 tablet 3     No current facility-administered medications for this visit  Objective:    Blood pressure 140/90, pulse (!) 107, temperature 97 7 °F (36 5 °C), temperature source Oral, height 5' 2 01" (1 575 m), weight 77 1 kg (170 lb)  Body mass index is 31 08 kg/m²  Body surface area is 1 78 meters squared  Physical Exam   Constitutional: She is oriented to person, place, and time  She appears well-developed and well-nourished  Pulmonary/Chest: Effort normal    Neurological: She is alert and oriented to person, place, and time  Skin: Skin is warm and dry  No rash noted  Psychiatric: She has a normal mood and affect  Her behavior is normal    Performance status is zero      Lab Results   Component Value Date     250 7 (H) 12/06/2019     Lab Results   Component Value Date    K 3 9 12/31/2019     12/31/2019    CO2 26 12/31/2019    BUN 24 12/31/2019    CREATININE 1 01 12/31/2019    GLUF 102 (H) 11/27/2019    CALCIUM 9 1 12/31/2019    AST 9 12/31/2019    ALT <6 (L) 12/31/2019    ALKPHOS 172 (H) 12/31/2019    EGFR 53 12/31/2019     Lab Results   Component Value Date    WBC 8 01 12/31/2019    HGB 11 9 12/31/2019    HCT 36 9 12/31/2019    MCV 91 12/31/2019     12/31/2019     Lab Results   Component Value Date    NEUTROABS 5 30 12/31/2019       Study Result     CT CHEST, ABDOMEN AND PELVIS WITH IV CONTRAST     INDICATION:   C56 1: Malignant neoplasm of right ovary  C56 2: Malignant neoplasm of left ovary      COMPARISON:  CT chest abdomen and pelvis 8/30/2019 and 8/28/2018     TECHNIQUE: CT examination of the chest, abdomen and pelvis was performed  Axial, sagittal, and coronal 2D reformatted images were created from the source data and submitted for interpretation      Radiation dose length product (DLP) for this visit:  517 mGy-cm   This examination, like all CT scans performed in the [facility], was performed utilizing techniques to minimize radiation dose exposure, including the use of iterative   reconstruction and automated exposure control      IV Contrast:  100 mL of iohexol (OMNIPAQUE)   350  Enteric Contrast: Enteric contrast was administered      FINDINGS:     CHEST     LUNGS:  Stable 1 3 x 1 1 cm lobulated nodule in the right lower lobe image 59 series 3      Few new discoid nodular densities will be measured on series 3:     Image 22, left lower lobe, 4 mm  Image 37, left lower lobe, 3 mm  Image 37, right lower lobe, 4 mm  Image 52, left lower lobe, 4 mm      No infiltrate  Mild emphysematous disease  Central airways are clear      PLEURA:  Unremarkable      HEART/GREAT VESSELS:  Heart is not enlarged  No pericardial effusion  Minimal aortic calcification  Tip of portacatheter in the high right atrium      MEDIASTINUM AND SIMI:  Tiny sliding hiatal hernia  Otherwise unremarkable      CHEST WALL AND LOWER NECK:   Right chest wall noe catheter in place  No enlarged lymph nodes  2 mm short axis left axillary lymph node image 23 series 2 previously 8 mm  However, there is been minimal interval enlargement of few subcentimeter short   axis bilateral axillary lymph nodes; representative nodes will be measured on series 2:     Image 12, left subpectoral, 7 mm previously 3 mm  Image 14, right subpectoral, 5 mm previously 2 mm  Image 18, left axillary, 5 mm previously 2 mm    Image 22, right axillary, 6 mm previously 4 mm      Incidental discovery of one or more thyroid nodule(s) measuring less than 1 5 cm and without suspicious features is noted in this patient who is above 28years old; according to guidelines published in the February 2015 white paper on incidental thyroid   nodules in the Journal of the [employer] of radiology Nataly De La Rosa), no further evaluation is recommended      ABDOMEN     LIVER/BILIARY TREE:  Unremarkable      GALLBLADDER:  No calcified gallstones  No pericholecystic inflammatory change      SPLEEN:  Unremarkable      PANCREAS:  Unremarkable      ADRENAL GLANDS:  Unremarkable      KIDNEYS/URETERS:  Stable small bilateral parapelvic simple cyst  No hydronephrosis or perinephric collection      STOMACH AND BOWEL:  Colorectal anastomotic suture line  No bowel obstruction      APPENDIX:  No findings to suggest appendicitis      ABDOMINOPELVIC CAVITY:  No ascites or free intraperitoneal air  No lymphadenopathy      VESSELS:  Aortoiliac calcification  No aneurysm      PELVIS     REPRODUCTIVE ORGANS:  Post hysterectomy      URINARY BLADDER:  Unremarkable      ABDOMINAL WALL/INGUINAL REGIONS:  Stable small ventral supraumbilical and bilateral inguinal left larger than right fat-containing hernias      OSSEOUS STRUCTURES:  No acute fracture or osseous destructive lesion identified  Degenerative changes of the spine, pubic symphysis, and multiple joints  Multilevel thoracic spondylosis in a pattern suggestive of diffuse idiopathic skeletal   hyperostosis  Mild dextroconvex thoracic and levoconvex lumbar scoliosis      IMPRESSION:     CT chest:     Several punctate pulmonary densities including a discrete 4 mm noncalcified nodule in the left lower lobe new since August 30, 2019   Based on current Fleischner Society 2017 Guidelines on incidental pulmonary nodule, patients with a known malignancy are   at increased risk of metastasis and should receive initial three month follow-up chest CT      Minimal interval enlargement of several subcentimeter short axis bilateral axillary and subpectoral lymph nodes  These could also be evaluated at time of follow-up chest CT  1 3 cm lobulated nodule right lower lobe unchanged as far back as August 28, 2018      Tiny sliding hiatal hernia      No other significant interval change      CT abdomen and pelvis:     No evidence of recurrent or metastatic disease      No significant interval change      The examination demonstrates a significant  finding and was documented as such in [facility] for liaison and referring practitioner notification

## 2020-01-03 NOTE — ASSESSMENT & PLAN NOTE
Recurrent stage IIIC ovarian cancer with elevated , left axillary lymph node and right breast metastasis  She is currently receiving doxil 30 mg/m2 and carboplatin AUC 5 every 28 days with avastin 10 mg/kg every 14 days  Avastin has been intermittently held due to hypertension  She is tolerating treatment well  She is having a biochemical response  Her  prior to cycle 4 is pending, but decreased from 1092 to 250  CT imaging after cycle 3 of treatment noted slight enlargement in axillary and subpectoral LNs, as well as several new pulmonary densities  CT imaging,  and treatment plan reviewed with Dr Sepideh Jacobs  Will plan to continue with current regimen and closely monitor        Continue with cycle 4 of treatment as planned  Her metabolic and hematologic parameters from 12/31/19 were reviewed, and are adequate for treatment       Return to the office as per her chemotherapy calendar

## 2020-01-17 NOTE — PLAN OF CARE
Problem: Potential for Falls  Goal: Patient will remain free of falls  Description  INTERVENTIONS:  - Assess patient frequently for physical needs  -  Identify cognitive and physical deficits and behaviors that affect risk of falls  -  [place] fall precautions as indicated by assessment   - Educate patient/family on patient safety including physical limitations  - Instruct patient to call for assistance with activity based on assessment  - Modify environment to reduce risk of injury  - Consider OT/PT consult to assist with strengthening/mobility  Outcome: Progressing     Problem: DISCHARGE PLANNING  Goal: Discharge to home or other facility with appropriate resources  Description  INTERVENTIONS:  - Identify barriers to discharge w/patient and caregiver  - Arrange for needed discharge resources and transportation as appropriate  - Identify discharge learning needs (meds, wound care, etc )  - Arrange for interpretive services to assist at discharge as needed  - Refer to Case Management Department for coordinating discharge planning if the patient needs post-hospital services based on physician/advanced practitioner order or complex needs related to functional status, cognitive ability, or social support system  Outcome: Progressing     Problem: Knowledge Deficit  Goal: Patient/family/caregiver demonstrates understanding of disease process, treatment plan, medications, and discharge instructions  Description  Complete learning assessment and assess knowledge base    Interventions:  - Provide teaching at level of understanding  - Provide teaching via preferred learning methods  Outcome: Progressing

## 2020-01-17 NOTE — PROGRESS NOTES
AVASTIN HELD DUE TO ELEVATED BP  PT TO FOLLOW UP WITH PCP  STATES HAS APPOINTMENT ON Monday 1 20 2020

## 2020-01-30 NOTE — ASSESSMENT & PLAN NOTE
[facility] with recurrent platinum sensitive stage IIIC ovarian cancer receiving palliative liposomal doxorubicin, carboplatin, Avastin  She had Avastin induced hypertension and had to hold her last dose  Her antihypertensives have been adjusted   is rising  She is tolerating treatment very well  Performance status is 0   1  Will proceed with cycle 5 of liposomal doxorubicin at 30 milligrams/meter squared, carboplatin AUC 5, Avastin 10 milligrams/kilogram   She will then have a CT assessment at the end of February  Will continue to trend    2  She understands that she may require switch in therapy and the rationale for continuing with the current regimen  I spent 15 minutes with the patient  More than half the time was spent in counseling and coordination of care regarding treatment of her recurrent ovarian cancer

## 2020-01-30 NOTE — PROGRESS NOTES
Assessment/Plan:    Problem List Items Addressed This Visit        Endocrine    Ovarian cancer ([facility] ) - Primary     [de-identified]year-old with recurrent platinum sensitive stage IIIC ovarian cancer receiving palliative liposomal doxorubicin, carboplatin, Avastin  She had Avastin induced hypertension and had to hold her last dose  Her antihypertensives have been adjusted   is rising  She is tolerating treatment very well  Performance status is 0   1  Will proceed with cycle 5 of liposomal doxorubicin at 30 milligrams/meter squared, carboplatin AUC 5, Avastin 10 milligrams/kilogram   She will then have a CT assessment at the end of February  Will continue to trend    2  She understands that she may require switch in therapy and the rationale for continuing with the current regimen  I spent 15 minutes with the patient  More than half the time was spent in counseling and coordination of care regarding treatment of her recurrent ovarian cancer  Relevant Orders    CT chest abdomen pelvis w contrast            CHIEF COMPLAINT:  Pre chemotherapy evaluation      Problem:  Cancer Staging  Ovarian cancer [facility])  Staging form: Ovary, Fallopian Tube, Primary Peritoneal, AJCC 8th Edition  - Clinical: ycT3c - Signed by Doreen Canchola MD on 12/6/2018        Previous therapy:     Ovarian cancer ([facility] )    8/16/2018 Biopsy     Paracentesis, metastatic adenocarcinoma, consistent with gyn primary       Chemotherapy     Neoadjuvant chemotherapy with taxol 80 mg/m2 weekly and carboplatin AUC 6 every 21 days  She received 3       10/11/2018 Genetic Testing     Mya Singh, wild-type  11/23/2018 Surgery     Diagnostic laparoscopy, exploratory laparotomy, modified radical hysterectomy, bilateral salpingo-oophorectomy, rectosigmoid resection with end-to-end reanastomosis, gastrocolic omentectomy, peritoneal resection  Residual tumor identified in the ovaries         - 1/25/2019 Chemotherapy     Adjuvant chemotherapy, taxol 175 mg/m2 and carboplatin AUC 5 every 21 days  She received 3 cycles (total cycle 6)  Taxol was dose reduced to 135 mg/m2 with her final cycle due to neutropenia and neuropathy  [address] Medicine testing suspended  Malignant cells were not >20% of content, unable to proceed with testing  Will require re-sampling in future  10/11/2019 -  Chemotherapy     Doxil 30 mg/m2 and carboplatin AUC 5 every 28 days, with avastin 10 mg/kg every 14 days  Avastin was held with day 1 of cycle 1 due to hypertension  The patient is scheduled for cycle 5 of treatment  Patient ID: Kaylen Silva is a [de-identified] y o  female  79-year-old returns for pre chemotherapy evaluation  She has no complaints  Her quality of life is excellent  Recent laboratory studies are as below   as of 1/29/2020 is 404 7 units/ml from a prior of 369 6 units/ml   prior to initiating palliative chemotherapy was 1092 0 units/ml  CT imaging in December of 2019 revealed a stable right lower lobe nodule with some possible new pulmonary nodules that are all less than 3 mm  Possible minimal large amount of axillary lymph node  No other interval change in her medications or medical history since her last visit          The following portions of the patient's history were reviewed and updated as appropriate: allergies, current medications, past family history, past medical history, past social history, past surgical history and problem list     Review of Systems    Current Outpatient Medications   Medication Sig Dispense Refill    amLODIPine (NORVASC) 5 mg tablet Take 5 mg by mouth daily      hydrochlorothiazide (HYDRODIURIL) 12 5 mg tablet Take 12 5 mg by mouth daily as needed  3    losartan (COZAAR) 50 mg tablet Take 50 mg by mouth daily  3    LORazepam (ATIVAN) 1 mg tablet Take 1 tablet (1 mg total) by mouth every 8 (eight) hours as needed (nausea or anxiety) (Patient not taking: Reported on 11/7/2019) 20 tablet 1    ondansetron (ZOFRAN) 8 mg tablet Take 1 tablet (8 mg total) by mouth every 8 (eight) hours as needed for nausea or vomiting (Patient not taking: Reported on 11/7/2019) 20 tablet 1    rivaroxaban (XARELTO) 20 mg tablet Take 1 tablet (20 mg total) by mouth daily with breakfast (Patient not taking: Reported on 10/11/2019) 30 tablet 3     No current facility-administered medications for this visit  Objective:    Blood pressure 164/92, pulse (!) 111, temperature 98 °F (36 7 °C), height 5' 2" (1 575 m), weight 75 5 kg (166 lb 8 oz)  Body mass index is 30 45 kg/m²  Body surface area is 1 77 meters squared  Physical Exam   Constitutional: She is oriented to person, place, and time  She appears well-developed and well-nourished  No distress  HENT:   Head: Normocephalic and atraumatic  Pulmonary/Chest: Effort normal    Musculoskeletal: She exhibits no edema  Neurological: She is alert and oriented to person, place, and time  Skin: Skin is warm and dry  She is not diaphoretic  Psychiatric: She has a normal mood and affect   Her behavior is normal  Judgment and thought content normal        Lab Results   Component Value Date     404 7 (H) 01/29/2020     Lab Results   Component Value Date    K 4 0 01/29/2020     01/29/2020    CO2 28 01/29/2020    BUN 24 01/29/2020    CREATININE 0 95 01/29/2020    GLUF 102 (H) 11/27/2019    CALCIUM 8 9 01/29/2020    AST 21 01/29/2020    ALT 18 01/29/2020    ALKPHOS 184 (H) 01/29/2020    EGFR 57 01/29/2020     Lab Results   Component Value Date    WBC 6 61 01/29/2020    HGB 11 8 01/29/2020    HCT 36 5 01/29/2020    MCV 92 01/29/2020     01/29/2020     Lab Results   Component Value Date    NEUTROABS 3 98 01/29/2020

## 2020-01-31 NOTE — PLAN OF CARE
Problem: Potential for Falls  Goal: Patient will remain free of falls  Description  INTERVENTIONS:  - Assess patient frequently for physical needs  -  Identify cognitive and physical deficits and behaviors that affect risk of falls  -  [place] fall precautions as indicated by assessment   - Educate patient/family on patient safety including physical limitations  - Instruct patient to call for assistance with activity based on assessment  - Modify environment to reduce risk of injury  - Consider OT/PT consult to assist with strengthening/mobility  Outcome: Progressing     Problem: Potential for Falls  Goal: Patient will remain free of falls  Description  INTERVENTIONS:  - Assess patient frequently for physical needs  -  Identify cognitive and physical deficits and behaviors that affect risk of falls    -  [place] fall precautions as indicated by assessment   - Educate patient/family on patient safety including physical limitations  - Instruct patient to call for assistance with activity based on assessment  - Modify environment to reduce risk of injury  - Consider OT/PT consult to assist with strengthening/mobility  Outcome: Progressing     Problem: DISCHARGE PLANNING  Goal: Discharge to home or other facility with appropriate resources  Description  INTERVENTIONS:  - Identify barriers to discharge w/patient and caregiver  - Arrange for needed discharge resources and transportation as appropriate  - Identify discharge learning needs (meds, wound care, etc )  - Arrange for interpretive services to assist at discharge as needed  - Refer to Case Management Department for coordinating discharge planning if the patient needs post-hospital services based on physician/advanced practitioner order or complex needs related to functional status, cognitive ability, or social support system  Outcome: Progressing     Problem: Knowledge Deficit  Goal: Patient/family/caregiver demonstrates understanding of disease process, treatment plan, medications, and discharge instructions  Description  Complete learning assessment and assess knowledge base    Interventions:  - Provide teaching at level of understanding  - Provide teaching via preferred learning methods  Outcome: Progressing

## 2020-02-14 NOTE — PLAN OF CARE
Problem: Potential for Falls  Goal: Patient will remain free of falls  Description  INTERVENTIONS:  - Assess patient frequently for physical needs  -  Identify cognitive and physical deficits and behaviors that affect risk of falls    -  [place] fall precautions as indicated by assessment   - Educate patient/family on patient safety including physical limitations  - Instruct patient to call for assistance with activity based on assessment  - Modify environment to reduce risk of injury  Outcome: Progressing

## 2020-02-27 NOTE — ASSESSMENT & PLAN NOTE
[de-identified]year-old with recurrent, now platinum resistant stage IIIC ovarian cancer who is status post 5 cycles of carboplatin, liposomal doxorubicin, and Avastin  Her  has been rising and is now 524 6  She has new imaging findings including a 7 x 6 mm perihepatic lesion as well as new small volume ascites  Her performance status is 0   1  She understands that she has disease progression and would likely benefit from a change in therapy  She understands that all treatments were palliative and there are no curative interventions available  2  I discussed treatment options including clinical trial participation  Based on review of available clinical trials, she is a candidate for   I discussed the treatment arms of this clinical trial including the olaparib plus cedirinib versus standard chemotherapy  She understands the risks and benefits and is agreeable to proceed with clinical trial participation  She will be contacted by our clinical trials nurse  3  In the event that she decides not to participate in a clinical trial, I discussed the risks and benefits of gemcitabine at 800 milligrams/meter squared given on day 1 and 8 of a 21 day cycle along with Avastin at 15 milligrams/kilogram given every 21 days  She was provided with written information regarding side effects of gemcitabine, olaparib  She understands the risks and benefits of treatment    Consent was obtained by me in the office for the gemcitabine and Avastin treatment only in the event that she does not want to participate in the clinical trial

## 2020-02-27 NOTE — PROGRESS NOTES
Assessment/Plan:    Problem List Items Addressed This Visit        Endocrine    Ovarian cancer ([facility] [address] ) - Primary     [de-identified]year-old with recurrent, now platinum resistant stage IIIC ovarian cancer who is status post 5 cycles of carboplatin, liposomal doxorubicin, and Avastin  Her  has been rising and is now 524 6  She has new imaging findings including a 7 x 6 mm perihepatic lesion as well as new small volume ascites  Her performance status is 0   1  She understands that she has disease progression and would likely benefit from a change in therapy  She understands that all treatments were palliative and there are no curative interventions available  2  I discussed treatment options including clinical trial participation  Based on review of available clinical trials, she is a candidate for   I discussed the treatment arms of this clinical trial including the olaparib plus cedirinib versus standard chemotherapy  She understands the risks and benefits and is agreeable to proceed with clinical trial participation  She will be contacted by our clinical trials nurse  3  In the event that she decides not to participate in a clinical trial, I discussed the risks and benefits of gemcitabine at 800 milligrams/meter squared given on day 1 and 8 of a 21 day cycle along with Avastin at 15 milligrams/kilogram given every 21 days  She was provided with written information regarding side effects of gemcitabine, olaparib  She understands the risks and benefits of treatment    Consent was obtained by me in the office for the gemcitabine and Avastin treatment only in the event that she does not want to participate in the clinical trial                    CHIEF COMPLAINT:  Treatment discussion, pre chemotherapy evaluation      Problem:  Cancer Staging  Ovarian cancer [facility])  Staging form: Ovary, Fallopian Tube, Primary Peritoneal, AJCC 8th Edition  - Clinical: ycT3c - Signed by Marianela Lovett MD on 12/6/2018        Previous therapy:     Ovarian cancer ([facility] [address] )    8/16/2018 Biopsy     Paracentesis, metastatic adenocarcinoma, consistent with gyn primary       Chemotherapy     Neoadjuvant chemotherapy with taxol 80 mg/m2 weekly and carboplatin AUC 6 every 21 days  She received 3       10/11/2018 Genetic Testing     Renato Conwaybourne, wild-type  11/23/2018 Surgery     Diagnostic laparoscopy, exploratory laparotomy, modified radical hysterectomy, bilateral salpingo-oophorectomy, rectosigmoid resection with end-to-end reanastomosis, gastrocolic omentectomy, peritoneal resection  Residual tumor identified in the ovaries  - 1/25/2019 Chemotherapy     Adjuvant chemotherapy, taxol 175 mg/m2 and carboplatin AUC 5 every 21 days  She received 3 cycles (total cycle 6)  Taxol was dose reduced to 135 mg/m2 with her final cycle due to neutropenia and neuropathy  [address] [facility] testing suspended  Malignant cells were not >20% of content, unable to proceed with testing  Will require re-sampling in future  10/11/2019 -  Chemotherapy     Doxil 30 mg/m2 and carboplatin AUC 5 every 28 days, with avastin 10 mg/kg every 14 days  Avastin was held with day 1 of cycle 1 due to hypertension  The patient is scheduled for cycle 5 of treatment  Patient ID: Madi Joe is a [de-identified] y o  female  50-year-old returns for treatment discussion  She has had a rising  well on palliative carboplatin, liposomal doxorubicin, and Avastin  Her laboratory studies are as below  She has received 5 cycles of treatment in her  has now risen to 524 6 units/ml  The prior value was 404 7 units/ml  CT of the chest abdomen pelvis on 2/25/2020 revealed a new 7 mm hypodense lesion on the liver as well as some new small volume ascites  She is asymptomatic other than feeling occasionally depressed  She is able to perform her activities of daily living without difficulty  No neuropathy    No other interval change in her medications or medical history since her last visit  The following portions of the patient's history were reviewed and updated as appropriate: allergies, current medications, past family history, past medical history, past social history, past surgical history and problem list     Review of Systems   Constitutional: Negative for activity change, fever and unexpected weight change  HENT: Positive for nosebleeds  Eyes: Negative  Respiratory: Negative  Negative for shortness of breath  Cardiovascular: Negative  Negative for chest pain  Gastrointestinal: Negative for abdominal distention and abdominal pain  Endocrine: Negative  Genitourinary: Negative for pelvic pain and vaginal bleeding  Musculoskeletal: Positive for arthralgias  Skin: Negative  Allergic/Immunologic: Negative  Neurological: Negative  Hematological: Negative  Psychiatric/Behavioral: Positive for dysphoric mood         Current Outpatient Medications   Medication Sig Dispense Refill    amLODIPine (NORVASC) 5 mg tablet Take 5 mg by mouth daily      halobetasol (ULTRAVATE) 0 05 % cream Apply 1 application topically 2 (two) times a day Gently massage into affected area(s)      hydrochlorothiazide (HYDRODIURIL) 12 5 mg tablet Take 12 5 mg by mouth daily as needed  3    losartan (COZAAR) 50 mg tablet Take 50 mg by mouth daily  3    LORazepam (ATIVAN) 1 mg tablet Take 1 tablet (1 mg total) by mouth every 8 (eight) hours as needed (nausea or anxiety) (Patient not taking: Reported on 11/7/2019) 20 tablet 1    ondansetron (ZOFRAN) 8 mg tablet Take 1 tablet (8 mg total) by mouth every 8 (eight) hours as needed for nausea or vomiting (Patient not taking: Reported on 11/7/2019) 20 tablet 1    rivaroxaban (XARELTO) 20 mg tablet Take 1 tablet (20 mg total) by mouth daily with breakfast (Patient not taking: Reported on 10/11/2019) 30 tablet 3     No current facility-administered medications for this visit  Objective:    Blood pressure 132/84, pulse (!) 109, temperature 98 9 °F (37 2 °C), temperature source Oral, resp  rate 18, height 5' 2" (1 575 m), weight 74 2 kg (163 lb 8 oz)  Body mass index is 29 9 kg/m²  Body surface area is 1 75 meters squared  Physical Exam   Constitutional: She is oriented to person, place, and time  She appears well-developed and well-nourished  No distress  HENT:   Head: Normocephalic and atraumatic  Right Ear: External ear normal    Left Ear: External ear normal    Nose: Nose normal    Eyes: Conjunctivae and EOM are normal  Right eye exhibits no discharge  Left eye exhibits no discharge  No scleral icterus  Cardiovascular: Normal rate, regular rhythm and normal heart sounds  Exam reveals no gallop and no friction rub  No murmur heard  Pulmonary/Chest: Effort normal  No stridor  No respiratory distress  She has no wheezes  She has rales  She exhibits no tenderness  Neurological: She is alert and oriented to person, place, and time  Skin: Skin is warm and dry  No rash noted  She is not diaphoretic  No erythema  No pallor  Psychiatric: She has a normal mood and affect   Her behavior is normal  Judgment and thought content normal        Lab Results   Component Value Date     524 6 (H) 02/05/2020     Lab Results   Component Value Date    K 3 6 02/26/2020     02/26/2020    CO2 30 02/26/2020    BUN 22 02/26/2020    CREATININE 0 84 02/26/2020    GLUF 108 (H) 02/19/2020    CALCIUM 9 0 02/26/2020    AST 9 02/26/2020    ALT 13 02/26/2020    ALKPHOS 223 (H) 02/26/2020    EGFR 66 02/26/2020     Lab Results   Component Value Date    WBC 5 74 02/26/2020    HGB 12 1 02/26/2020    HCT 37 9 02/26/2020    MCV 96 02/26/2020     02/26/2020     Lab Results   Component Value Date    NEUTROABS 3 29 02/26/2020

## 2020-03-02 NOTE — PROGRESS NOTES
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

## 2020-03-13 NOTE — ASSESSMENT & PLAN NOTE
79 with platinum resistant stage IIIC ovarian cancer s/p most recently carbo/doxil/avastin with rising  and new lesions on imaging  Presents today to consent for   Her performance status is 0  Disease prognosis and status was previously addressed by Dr Emilie Ormond as well as treatment options including clinical trials and/or chemotherapy off trial  She was offered gemzar/avastin vs   She has decided to proceed with   I have discussed the 4 arms to include standard chemo, olaparib/cedirinib, olaparib alone or cedirinib alone as well as the side effects of these drugs  She understands the risks/benefits of the treatment  Consents were obtained  Clinical trials team also given time to discuss trial with the patient adequately

## 2020-03-13 NOTE — PROGRESS NOTES
Assessment/Plan:    Problem List Items Addressed This Visit        Endocrine    Ovarian cancer ([facility] [address] ) - Primary     79 with platinum resistant stage IIIC ovarian cancer s/p most recently carbo/doxil/avastin with rising  and new lesions on imaging  Presents today to consent for   Her performance status is 0  Disease prognosis and status was previously addressed by Dr Scott Davison as well as treatment options including clinical trials and/or chemotherapy off trial  She was offered gemzar/avastin vs   She has decided to proceed with   I have discussed the 4 arms to include standard chemo, olaparib/cedirinib, olaparib alone or cedirinib alone as well as the side effects of these drugs  She understands the risks/benefits of the treatment  Consents were obtained  Clinical trials team also given time to discuss trial with the patient adequately  CHIEF COMPLAINT: ovarian cancer      Problem:  Cancer Staging  Ovarian cancer [facility])  Staging form: Ovary, Fallopian Tube, Primary Peritoneal, AJCC 8th Edition  - Clinical: ycT3c - Signed by Art Pickett MD on 12/6/2018        Previous therapy:     Ovarian cancer ([facility] [address] )    8/16/2018 Biopsy     Paracentesis, metastatic adenocarcinoma, consistent with gyn primary       Chemotherapy     Neoadjuvant chemotherapy with taxol 80 mg/m2 weekly and carboplatin AUC 6 every 21 days  She received 3       10/11/2018 Genetic Testing     Sabina Moore, wild-type  11/23/2018 Surgery     Diagnostic laparoscopy, exploratory laparotomy, modified radical hysterectomy, bilateral salpingo-oophorectomy, rectosigmoid resection with end-to-end reanastomosis, gastrocolic omentectomy, peritoneal resection  Residual tumor identified in the ovaries  - 1/25/2019 Chemotherapy     Adjuvant chemotherapy, taxol 175 mg/m2 and carboplatin AUC 5 every 21 days  She received 3 cycles (total cycle 6)   Taxol was dose reduced to 135 mg/m2 with her final cycle due to neutropenia and neuropathy  [address] Medicine testing suspended  Malignant cells were not >20% of content, unable to proceed with testing  Will require re-sampling in future  10/11/2019 -  Chemotherapy     Doxil 30 mg/m2 and carboplatin AUC 5 every 28 days, with avastin 10 mg/kg every 14 days  Avastin was held with day 1 of cycle 1 due to hypertension  The patient is scheduled for cycle 5 of treatment  Patient ID: Linda Miller is a [de-identified] y o  female  [de-identified] with platinum resistant stage IIIC ovarian cancer presents for consent for   Previously received carbo/doxil/avastin now with rising ca125 and new lesions on imaging  Pt is without any complaints at this time  No new skin lesions/rashes  No N/V  No peripheral neuropathy  The following portions of the patient's history were reviewed and updated as appropriate: allergies, current medications, past family history, past medical history, past social history, past surgical history and problem list     Review of Systems   Constitutional: Negative for appetite change, chills, fatigue and fever  Respiratory: Negative for chest tightness and shortness of breath  Gastrointestinal: Negative for abdominal distention, abdominal pain, constipation, diarrhea and nausea  Genitourinary: Negative for difficulty urinating, flank pain, frequency, urgency, vaginal bleeding, vaginal discharge and vaginal pain  Musculoskeletal: Negative for back pain, joint swelling and myalgias  Skin: Negative for rash  Neurological: Negative for dizziness, light-headedness, numbness and headaches         Current Outpatient Medications   Medication Sig Dispense Refill    amLODIPine (NORVASC) 5 mg tablet Take 5 mg by mouth daily      halobetasol (ULTRAVATE) 0 05 % cream Apply 1 application topically 2 (two) times a day Gently massage into affected area(s)      hydrochlorothiazide (HYDRODIURIL) 12 5 mg tablet Take 12 5 mg by mouth daily as needed  3    LORazepam (ATIVAN) 1 mg tablet Take 1 tablet (1 mg total) by mouth every 8 (eight) hours as needed (nausea or anxiety) 20 tablet 1    losartan (COZAAR) 50 mg tablet Take 50 mg by mouth daily  3    ondansetron (ZOFRAN) 8 mg tablet Take 1 tablet (8 mg total) by mouth every 8 (eight) hours as needed for nausea or vomiting 20 tablet 1    rivaroxaban (XARELTO) 20 mg tablet Take 1 tablet (20 mg total) by mouth daily with breakfast 30 tablet 3     No current facility-administered medications for this visit  Objective:    Blood pressure 142/90, pulse (!) 112, temperature 98 2 °F (36 8 °C), temperature source Tympanic, height 5' 2" (1 575 m), weight 72 1 kg (159 lb)  Body mass index is 29 08 kg/m²  Body surface area is 1 73 meters squared  Physical Exam   Constitutional: She is oriented to person, place, and time  She appears well-developed and well-nourished  HENT:   Head: Normocephalic and atraumatic  Neck: Normal range of motion  Cardiovascular: Normal rate and regular rhythm  Pulmonary/Chest: Effort normal    Abdominal: Soft  Genitourinary:   Genitourinary Comments: defer     Musculoskeletal: Normal range of motion  Neurological: She is alert and oriented to person, place, and time  Skin: Skin is warm and dry  Psychiatric: She has a normal mood and affect          Trend:  Lab Results   Component Value Date     524 6 (H) 02/05/2020     404 7 (H) 01/29/2020     369 6 (H) 01/15/2020     378 7 (H) 01/08/2020     288 3 (H) 01/02/2020     250 7 (H) 12/06/2019     319 3 (H) 11/13/2019     326 5 (H) 11/06/2019     848 0 (H) 10/23/2019     1,092 0 (H) 10/16/2019     158 0 (H) 08/28/2019     10 9 05/22/2019     10 3 02/19/2019     11 2 02/13/2019     11 6 01/23/2019     13 6 01/18/2019     12 0 01/16/2019     12 3 01/02/2019     14 2 12/27/2018     25 9 12/12/2018  37 1 (H) 10/31/2018     146 5 (H) 10/10/2018     233 4 (H) 10/03/2018     1,112 4 (H) 09/12/2018     535 9 (H) 08/16/2018       Lab Results   Component Value Date    K 3 6 02/26/2020     02/26/2020    CO2 30 02/26/2020    BUN 22 02/26/2020    CREATININE 0 84 02/26/2020    GLUF 108 (H) 02/19/2020    CALCIUM 9 0 02/26/2020    AST 9 02/26/2020    ALT 13 02/26/2020    ALKPHOS 223 (H) 02/26/2020    EGFR 66 02/26/2020     Lab Results   Component Value Date    WBC 5 74 02/26/2020    HGB 12 1 02/26/2020    HCT 37 9 02/26/2020    MCV 96 02/26/2020     02/26/2020     Lab Results   Component Value Date    NEUTROABS 3 29 02/26/2020

## 2020-03-26 PROBLEM — R93.89 ABNORMAL CT OF THE CHEST: Status: ACTIVE | Noted: 2020-01-01

## 2020-03-26 PROBLEM — N39.0 UTI (URINARY TRACT INFECTION): Status: ACTIVE | Noted: 2020-01-01

## 2020-03-26 PROBLEM — J96.01 ACUTE RESPIRATORY FAILURE WITH HYPOXIA (HCC): Status: ACTIVE | Noted: 2020-01-01

## 2020-03-26 PROBLEM — E87.6 HYPOKALEMIA: Status: ACTIVE | Noted: 2020-01-01

## 2020-03-26 PROBLEM — N17.9 AKI (ACUTE KIDNEY INJURY) (HCC): Status: ACTIVE | Noted: 2020-01-01

## 2020-03-26 PROBLEM — A41.9 SEPSIS (HCC): Status: ACTIVE | Noted: 2020-01-01

## 2020-03-26 PROBLEM — R10.9 ABDOMINAL PAIN: Status: ACTIVE | Noted: 2020-01-01

## 2020-03-26 NOTE — TELEPHONE ENCOUNTER
Reason for Disposition   Nursing judgment    Answer Assessment - Initial Assessment Questions  1  REASON FOR CALL or QUESTION: "What is your reason for calling today?" or "How can I best help you?" or "What question do you have that I can help answer?" originally called in with Covid questions  Patient had spoken with her PCP Dr Sophie Melissa and he has ordered blood work and a chest xray at [address] today  Advised to take precautions said she believes she has a mask and she will follow up directly with Dr Gianni Lucio      Protocols used: INFORMATION ONLY CALL - NO TRIAGE-ADULT-[place]

## 2020-03-26 NOTE — TELEPHONE ENCOUNTER
Regarding: Coronavirus  ----- Message from Ana Laura Nunez sent at 3/26/2020  9:39 AM EDT -----  Temperature 94  Dry Cough since her last chemo treatment 2/14  SOB started a few days ago  PCP told her to call the hotline

## 2020-03-27 PROBLEM — C79.9 METASTATIC CANCER (HCC): Status: ACTIVE | Noted: 2020-01-01

## 2020-03-27 PROBLEM — Z71.89 ADVANCED CARE PLANNING/COUNSELING DISCUSSION: Status: ACTIVE | Noted: 2020-01-01

## 2020-03-27 PROBLEM — R91.8 PULMONARY INFILTRATES: Status: ACTIVE | Noted: 2020-01-01

## 2020-03-28 PROBLEM — R82.71 ASYMPTOMATIC BACTERIURIA: Status: ACTIVE | Noted: 2020-01-01

## 2020-03-28 PROBLEM — E87.6 HYPOKALEMIA: Status: RESOLVED | Noted: 2020-01-01 | Resolved: 2020-01-01

## 2020-03-31 PROBLEM — I10 ESSENTIAL HYPERTENSION: Status: ACTIVE | Noted: 2020-01-01

## 2020-03-31 PROBLEM — R10.9 ABDOMINAL PAIN: Status: RESOLVED | Noted: 2020-01-01 | Resolved: 2020-01-01

## 2020-03-31 PROBLEM — N17.9 AKI (ACUTE KIDNEY INJURY) (HCC): Status: RESOLVED | Noted: 2020-01-01 | Resolved: 2020-01-01

## 2020-04-01 PROBLEM — A41.9 SEPSIS (HCC): Status: RESOLVED | Noted: 2020-01-01 | Resolved: 2020-01-01

## 2020-04-06 PROBLEM — R21 SKIN RASH: Status: RESOLVED | Noted: 2018-10-11 | Resolved: 2020-01-01

## 2020-04-06 PROBLEM — R82.71 ASYMPTOMATIC BACTERIURIA: Status: RESOLVED | Noted: 2020-01-01 | Resolved: 2020-01-01

## 2020-04-06 PROBLEM — Z98.890 POSTOPERATIVE STATE: Status: RESOLVED | Noted: 2019-01-04 | Resolved: 2020-01-01

## 2020-04-06 PROBLEM — I50.21 ACUTE SYSTOLIC HEART FAILURE (HCC): Status: ACTIVE | Noted: 2020-01-01

## 2020-04-06 PROBLEM — R65.10 SIRS (SYSTEMIC INFLAMMATORY RESPONSE SYNDROME) (HCC): Status: ACTIVE | Noted: 2020-01-01

## 2020-04-06 PROBLEM — I82.4Z2 DVT, LOWER EXTREMITY, DISTAL, ACUTE, LEFT (HCC): Status: RESOLVED | Noted: 2018-08-16 | Resolved: 2020-01-01

## 2020-04-07 PROBLEM — N18.4 CHRONIC KIDNEY DISEASE, STAGE 4 (SEVERE) (HCC): Status: ACTIVE | Noted: 2020-01-01

## 2020-04-09 ENCOUNTER — PATIENT OUTREACH (OUTPATIENT)
Dept: CASE MANAGEMENT | Facility: OTHER | Age: 80
End: 2020-04-09

## 2020-04-09 LAB
FIBRINOGEN PPP-MCNC: 267 MG/DL (ref 227–495)
SARS-COV-2 RNA RESP QL NAA+PROBE: NEGATIVE

## 2020-04-11 LAB
BACTERIA BLD CULT: NORMAL
BACTERIA BLD CULT: NORMAL

## 2021-12-22 NOTE — SEDATION DOCUMENTATION
Para  Done  3,000ml svetlana fluid drained  DSD to site  Pt  Tolerated well 
You can access the FollowMyHealth Patient Portal offered by [facility] by registering at the following website: [email or web address]. By joining [facility]’s FollowMyHealth portal, you will also be able to view your health information using other applications (apps) compatible with our system.

## 2022-08-24 NOTE — ASSESSMENT & PLAN NOTE
69-year-old with 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 who has responded well to 3 cycles of neoadjuvant chemotherapy with carboplatin and Taxol   has declined to 37 units/ml from a starting value of 1112 units/ml  CT of the chest abdomen and pelvis demonstrates measurable response with resolution of ascites and decrease in size of her left adnexal mass  A 1 1 cm lung nodule in the right lower lobe is unchanged and was not evident on PET scan  She has a DVT and is currently receiving therapeutic anticoagulation with Xarelto  Genetic testing was negative for BRCA mutation  Her performance status is 0   1  I discussed the risks and benefits of diagnostic laparoscopy with possible conversion to exploratory laparotomy, total abdominal hysterectomy, bilateral salpingo-oophorectomy with tumor debulking as indicated, possible intraperitoneal port placement  She understands the risks and benefits of the operation and agrees to proceed as outlined  Consent was obtained by me in the office for surgery as well as for potential blood transfusion  2  Preoperative insertion of removable IVC filter  She was counseled regarding stopping therapeutic anticoagulation preoperatively and regarding postoperative transition from IV therapeutic anticoagulation to oral   3  Postoperatively, the plan will be to continue with chemotherapy  If an intraperitoneal port is able to be inserted, the plan will be to receive 4 cycles of chemo post surgery  Rx Refill Note  Requested Prescriptions     Pending Prescriptions Disp Refills   • Insulin Pen Needle (BD Pen Needle Leta U/F) 32G X 4 MM misc 100 each 2     Sig: Use daily with Victoza pen.      Last office visit with prescribing clinician: 1/19/2022      Next office visit with prescribing clinician: 9/19/2022            SAAD HANEY MA  08/24/22, 17:38 EDT

## (undated) DEVICE — JACKSON-PRATT 100CC BULB RESERVOIR: Brand: CARDINAL HEALTH

## (undated) DEVICE — SUT VICRYL 0 REEL 54 IN J287G

## (undated) DEVICE — INTENDED FOR TISSUE SEPARATION, AND OTHER PROCEDURES THAT REQUIRE A SHARP SURGICAL BLADE TO PUNCTURE OR CUT.: Brand: BARD-PARKER SAFETY BLADES SIZE 11, STERILE

## (undated) DEVICE — SUT STRATAFIX SPIRAL 3-0 PGA/PCL 30 X 30 CM SXMD2B408

## (undated) DEVICE — SUT PDS II 1 XLH 96 IN LOOPED Z881G

## (undated) DEVICE — CONTOUR CURVED CUTTER STAPLER RELOAD: Brand: CONTOUR

## (undated) DEVICE — TRAY FOLEY 16FR URIMETER SILICONE SURESTEP

## (undated) DEVICE — SUT VICRYL 3-0 SH 27 IN J416H

## (undated) DEVICE — TOWEL SET X-RAY

## (undated) DEVICE — ADHESIVE SKN CLSR HISTOACRYL FLEX 0.5ML LF

## (undated) DEVICE — GAUZE SPONGES,16 PLY: Brand: CURITY

## (undated) DEVICE — Device: Brand: DISPOSABLE BULB & BLADDER

## (undated) DEVICE — INSUFFLATION TUBING PRIMFLO

## (undated) DEVICE — TUBING SUCTION 5MM X 12 FT

## (undated) DEVICE — ANTIBACTERIAL UNDYED BRAIDED (POLYGLACTIN 910), SYNTHETIC ABSORBABLE SUTURE: Brand: COATED VICRYL

## (undated) DEVICE — ENSEAL 20 CM SHAFT, LARGE JAW: Brand: ENSEAL X1

## (undated) DEVICE — CHLORAPREP HI-LITE 26ML ORANGE

## (undated) DEVICE — SYRINGE 10ML LL

## (undated) DEVICE — ENDOSCOPIC CURVED INTRALUMINAL STAPLER (ILS) 24 TITANIUM ADJUSTABLE HEIGHT STAPLES

## (undated) DEVICE — ELECTRODE BLADE MOD  E-Z CLEAN 6.5IN -0014M

## (undated) DEVICE — SUT VICRYL 0 CT-1 CR/8 27 IN JJ41G

## (undated) DEVICE — CHLORHEXIDINE 4PCT 4 OZ

## (undated) DEVICE — STERILE CYSTO PACK: Brand: CARDINAL HEALTH

## (undated) DEVICE — PENCIL ELECTROSURG E-Z CLEAN -0035H

## (undated) DEVICE — 1820 FOAM BLOCK NEEDLE COUNTER: Brand: DEVON

## (undated) DEVICE — ABDOMINAL PAD: Brand: DERMACEA

## (undated) DEVICE — PREMIUM DRY TRAY LF: Brand: MEDLINE INDUSTRIES, INC.

## (undated) DEVICE — NEEDLE 25G X 1 1/2

## (undated) DEVICE — 3000CC GUARDIAN II: Brand: GUARDIAN

## (undated) DEVICE — SYRINGE BULB 2 OZ

## (undated) DEVICE — JP CHANNEL DRAIN 19FR, FULL FLUTES: Brand: JACKSON-PRATT

## (undated) DEVICE — ALL PURPOSE SPONGES,NONWOVEN, 4 PLY: Brand: CURITY

## (undated) DEVICE — SUT PROLENE 3-0 SH 36 IN 8522H

## (undated) DEVICE — CONTOUR CURVED CUTTER STAPLER: Brand: CONTOUR

## (undated) DEVICE — TELFA NON-ADHERENT ABSORBENT DRESSING: Brand: TELFA

## (undated) DEVICE — INTENDED FOR TISSUE SEPARATION, AND OTHER PROCEDURES THAT REQUIRE A SHARP SURGICAL BLADE TO PUNCTURE OR CUT.: Brand: BARD-PARKER SAFETY BLADES SIZE 15, STERILE

## (undated) DEVICE — CAUTERY TIP POLISHER: Brand: DEVON

## (undated) DEVICE — MEDI-VAC YANKAUER SUCTION HANDLE W/BULBOUS AND CONTROL VENT: Brand: CARDINAL HEALTH

## (undated) DEVICE — GLOVE PI ULTRA TOUCH SZ.7.5

## (undated) DEVICE — MEDI-VAC YANK SUCT HNDL W/TPRD BULBOUS TIP: Brand: CARDINAL HEALTH

## (undated) DEVICE — ENDOPATH XCEL BLADELESS TROCARS WITH STABILITY SLEEVES: Brand: ENDOPATH XCEL

## (undated) DEVICE — SUT PLAIN 2-0 CTX 27 IN 872H

## (undated) DEVICE — PACK PBDS STERILE LAP LITHOTOMY RF

## (undated) DEVICE — GLOVE INDICATOR PI UNDERGLOVE SZ 8 BLUE